# Patient Record
Sex: FEMALE | Race: BLACK OR AFRICAN AMERICAN | NOT HISPANIC OR LATINO | URBAN - METROPOLITAN AREA
[De-identification: names, ages, dates, MRNs, and addresses within clinical notes are randomized per-mention and may not be internally consistent; named-entity substitution may affect disease eponyms.]

---

## 2017-03-22 ENCOUNTER — OUTPATIENT (OUTPATIENT)
Dept: OUTPATIENT SERVICES | Facility: HOSPITAL | Age: 62
LOS: 1 days | Discharge: HOME | End: 2017-03-22

## 2017-06-27 DIAGNOSIS — B20 HUMAN IMMUNODEFICIENCY VIRUS [HIV] DISEASE: ICD-10-CM

## 2017-06-28 ENCOUNTER — OUTPATIENT (OUTPATIENT)
Dept: OUTPATIENT SERVICES | Facility: HOSPITAL | Age: 62
LOS: 1 days | Discharge: HOME | End: 2017-06-28

## 2017-06-28 DIAGNOSIS — R06.02 SHORTNESS OF BREATH: ICD-10-CM

## 2017-06-28 DIAGNOSIS — R11.2 NAUSEA WITH VOMITING, UNSPECIFIED: ICD-10-CM

## 2017-06-28 DIAGNOSIS — Z21 ASYMPTOMATIC HUMAN IMMUNODEFICIENCY VIRUS [HIV] INFECTION STATUS: ICD-10-CM

## 2017-06-28 DIAGNOSIS — B20 HUMAN IMMUNODEFICIENCY VIRUS [HIV] DISEASE: ICD-10-CM

## 2017-06-28 DIAGNOSIS — I10 ESSENTIAL (PRIMARY) HYPERTENSION: ICD-10-CM

## 2017-06-28 DIAGNOSIS — E11.9 TYPE 2 DIABETES MELLITUS WITHOUT COMPLICATIONS: ICD-10-CM

## 2017-06-28 DIAGNOSIS — R19.7 DIARRHEA, UNSPECIFIED: ICD-10-CM

## 2017-06-28 DIAGNOSIS — I67.1 CEREBRAL ANEURYSM, NONRUPTURED: ICD-10-CM

## 2017-06-28 DIAGNOSIS — F41.9 ANXIETY DISORDER, UNSPECIFIED: ICD-10-CM

## 2017-06-28 DIAGNOSIS — R63.4 ABNORMAL WEIGHT LOSS: ICD-10-CM

## 2017-09-27 ENCOUNTER — OUTPATIENT (OUTPATIENT)
Dept: OUTPATIENT SERVICES | Facility: HOSPITAL | Age: 62
LOS: 1 days | Discharge: HOME | End: 2017-09-27

## 2017-09-27 DIAGNOSIS — R63.4 ABNORMAL WEIGHT LOSS: ICD-10-CM

## 2017-09-27 DIAGNOSIS — R19.7 DIARRHEA, UNSPECIFIED: ICD-10-CM

## 2017-09-27 DIAGNOSIS — I67.1 CEREBRAL ANEURYSM, NONRUPTURED: ICD-10-CM

## 2017-09-27 DIAGNOSIS — E11.9 TYPE 2 DIABETES MELLITUS WITHOUT COMPLICATIONS: ICD-10-CM

## 2017-09-27 DIAGNOSIS — R11.2 NAUSEA WITH VOMITING, UNSPECIFIED: ICD-10-CM

## 2017-09-27 DIAGNOSIS — R06.02 SHORTNESS OF BREATH: ICD-10-CM

## 2017-09-27 DIAGNOSIS — Z21 ASYMPTOMATIC HUMAN IMMUNODEFICIENCY VIRUS [HIV] INFECTION STATUS: ICD-10-CM

## 2017-09-27 DIAGNOSIS — F41.9 ANXIETY DISORDER, UNSPECIFIED: ICD-10-CM

## 2017-09-27 DIAGNOSIS — I10 ESSENTIAL (PRIMARY) HYPERTENSION: ICD-10-CM

## 2017-09-27 DIAGNOSIS — B20 HUMAN IMMUNODEFICIENCY VIRUS [HIV] DISEASE: ICD-10-CM

## 2017-12-13 ENCOUNTER — OUTPATIENT (OUTPATIENT)
Dept: OUTPATIENT SERVICES | Facility: HOSPITAL | Age: 62
LOS: 1 days | Discharge: HOME | End: 2017-12-13

## 2017-12-13 DIAGNOSIS — Z21 ASYMPTOMATIC HUMAN IMMUNODEFICIENCY VIRUS [HIV] INFECTION STATUS: ICD-10-CM

## 2017-12-13 DIAGNOSIS — I67.1 CEREBRAL ANEURYSM, NONRUPTURED: ICD-10-CM

## 2017-12-13 DIAGNOSIS — R63.4 ABNORMAL WEIGHT LOSS: ICD-10-CM

## 2017-12-13 DIAGNOSIS — R19.7 DIARRHEA, UNSPECIFIED: ICD-10-CM

## 2017-12-13 DIAGNOSIS — E11.9 TYPE 2 DIABETES MELLITUS WITHOUT COMPLICATIONS: ICD-10-CM

## 2017-12-13 DIAGNOSIS — R06.02 SHORTNESS OF BREATH: ICD-10-CM

## 2017-12-13 DIAGNOSIS — B20 HUMAN IMMUNODEFICIENCY VIRUS [HIV] DISEASE: ICD-10-CM

## 2017-12-13 DIAGNOSIS — R11.2 NAUSEA WITH VOMITING, UNSPECIFIED: ICD-10-CM

## 2017-12-13 DIAGNOSIS — F41.9 ANXIETY DISORDER, UNSPECIFIED: ICD-10-CM

## 2017-12-13 DIAGNOSIS — I10 ESSENTIAL (PRIMARY) HYPERTENSION: ICD-10-CM

## 2018-03-14 ENCOUNTER — OUTPATIENT (OUTPATIENT)
Dept: OUTPATIENT SERVICES | Facility: HOSPITAL | Age: 63
LOS: 1 days | Discharge: HOME | End: 2018-03-14

## 2018-03-15 DIAGNOSIS — B20 HUMAN IMMUNODEFICIENCY VIRUS [HIV] DISEASE: ICD-10-CM

## 2018-06-09 ENCOUNTER — EMERGENCY (EMERGENCY)
Facility: HOSPITAL | Age: 63
LOS: 0 days | Discharge: HOME | End: 2018-06-09
Attending: EMERGENCY MEDICINE | Admitting: EMERGENCY MEDICINE

## 2018-06-09 VITALS
DIASTOLIC BLOOD PRESSURE: 82 MMHG | SYSTOLIC BLOOD PRESSURE: 138 MMHG | RESPIRATION RATE: 18 BRPM | TEMPERATURE: 99 F | OXYGEN SATURATION: 98 % | HEART RATE: 90 BPM

## 2018-06-09 VITALS
TEMPERATURE: 98 F | SYSTOLIC BLOOD PRESSURE: 152 MMHG | HEART RATE: 80 BPM | OXYGEN SATURATION: 96 % | DIASTOLIC BLOOD PRESSURE: 88 MMHG | RESPIRATION RATE: 18 BRPM

## 2018-06-09 DIAGNOSIS — N20.0 CALCULUS OF KIDNEY: ICD-10-CM

## 2018-06-09 DIAGNOSIS — R05 COUGH: ICD-10-CM

## 2018-06-09 DIAGNOSIS — J18.9 PNEUMONIA, UNSPECIFIED ORGANISM: ICD-10-CM

## 2018-06-09 DIAGNOSIS — F17.200 NICOTINE DEPENDENCE, UNSPECIFIED, UNCOMPLICATED: ICD-10-CM

## 2018-06-09 LAB
ALBUMIN SERPL ELPH-MCNC: 4.4 G/DL — SIGNIFICANT CHANGE UP (ref 3.5–5.2)
ALP SERPL-CCNC: 146 U/L — HIGH (ref 30–115)
ALT FLD-CCNC: 21 U/L — SIGNIFICANT CHANGE UP (ref 0–41)
ANION GAP SERPL CALC-SCNC: 15 MMOL/L — HIGH (ref 7–14)
APPEARANCE UR: CLEAR — SIGNIFICANT CHANGE UP
AST SERPL-CCNC: 45 U/L — HIGH (ref 0–41)
BASOPHILS # BLD AUTO: 0.03 K/UL — SIGNIFICANT CHANGE UP (ref 0–0.2)
BASOPHILS NFR BLD AUTO: 0.5 % — SIGNIFICANT CHANGE UP (ref 0–1)
BILIRUB SERPL-MCNC: 0.3 MG/DL — SIGNIFICANT CHANGE UP (ref 0.2–1.2)
BILIRUB UR-MCNC: NEGATIVE — SIGNIFICANT CHANGE UP
BUN SERPL-MCNC: 13 MG/DL — SIGNIFICANT CHANGE UP (ref 10–20)
CALCIUM SERPL-MCNC: 9.8 MG/DL — SIGNIFICANT CHANGE UP (ref 8.5–10.1)
CHLORIDE SERPL-SCNC: 102 MMOL/L — SIGNIFICANT CHANGE UP (ref 98–110)
CO2 SERPL-SCNC: 23 MMOL/L — SIGNIFICANT CHANGE UP (ref 17–32)
COLOR SPEC: YELLOW — SIGNIFICANT CHANGE UP
CREAT SERPL-MCNC: 1.5 MG/DL — SIGNIFICANT CHANGE UP (ref 0.7–1.5)
DIFF PNL FLD: NEGATIVE — SIGNIFICANT CHANGE UP
EOSINOPHIL # BLD AUTO: 0.08 K/UL — SIGNIFICANT CHANGE UP (ref 0–0.7)
EOSINOPHIL NFR BLD AUTO: 1.4 % — SIGNIFICANT CHANGE UP (ref 0–8)
EPI CELLS # UR: (no result) /HPF
GLUCOSE SERPL-MCNC: 145 MG/DL — HIGH (ref 70–99)
GLUCOSE UR QL: NEGATIVE MG/DL — SIGNIFICANT CHANGE UP
HCT VFR BLD CALC: 38.8 % — SIGNIFICANT CHANGE UP (ref 37–47)
HGB BLD-MCNC: 12.8 G/DL — SIGNIFICANT CHANGE UP (ref 12–16)
IMM GRANULOCYTES NFR BLD AUTO: 0.2 % — SIGNIFICANT CHANGE UP (ref 0.1–0.3)
KETONES UR-MCNC: NEGATIVE — SIGNIFICANT CHANGE UP
LACTATE SERPL-SCNC: 1.6 MMOL/L — SIGNIFICANT CHANGE UP (ref 0.5–2.2)
LEUKOCYTE ESTERASE UR-ACNC: NEGATIVE — SIGNIFICANT CHANGE UP
LIDOCAIN IGE QN: 47 U/L — SIGNIFICANT CHANGE UP (ref 7–60)
LYMPHOCYTES # BLD AUTO: 2.21 K/UL — SIGNIFICANT CHANGE UP (ref 1.2–3.4)
LYMPHOCYTES # BLD AUTO: 39.4 % — SIGNIFICANT CHANGE UP (ref 20.5–51.1)
MCHC RBC-ENTMCNC: 29.5 PG — SIGNIFICANT CHANGE UP (ref 27–31)
MCHC RBC-ENTMCNC: 33 G/DL — SIGNIFICANT CHANGE UP (ref 32–37)
MCV RBC AUTO: 89.4 FL — SIGNIFICANT CHANGE UP (ref 81–99)
MONOCYTES # BLD AUTO: 0.53 K/UL — SIGNIFICANT CHANGE UP (ref 0.1–0.6)
MONOCYTES NFR BLD AUTO: 9.4 % — HIGH (ref 1.7–9.3)
NEUTROPHILS # BLD AUTO: 2.75 K/UL — SIGNIFICANT CHANGE UP (ref 1.4–6.5)
NEUTROPHILS NFR BLD AUTO: 49.1 % — SIGNIFICANT CHANGE UP (ref 42.2–75.2)
NITRITE UR-MCNC: NEGATIVE — SIGNIFICANT CHANGE UP
NRBC # BLD: 0 /100 WBCS — SIGNIFICANT CHANGE UP (ref 0–0)
PH UR: 6 — SIGNIFICANT CHANGE UP (ref 5–8)
PLATELET # BLD AUTO: 293 K/UL — SIGNIFICANT CHANGE UP (ref 130–400)
POTASSIUM SERPL-MCNC: 4.7 MMOL/L — SIGNIFICANT CHANGE UP (ref 3.5–5)
POTASSIUM SERPL-SCNC: 4.7 MMOL/L — SIGNIFICANT CHANGE UP (ref 3.5–5)
PROT SERPL-MCNC: 7.7 G/DL — SIGNIFICANT CHANGE UP (ref 6–8)
PROT UR-MCNC: (no result) MG/DL
RBC # BLD: 4.34 M/UL — SIGNIFICANT CHANGE UP (ref 4.2–5.4)
RBC # FLD: 13.3 % — SIGNIFICANT CHANGE UP (ref 11.5–14.5)
SODIUM SERPL-SCNC: 140 MMOL/L — SIGNIFICANT CHANGE UP (ref 135–146)
SP GR SPEC: <=1.005 — SIGNIFICANT CHANGE UP (ref 1.01–1.03)
TROPONIN T SERPL-MCNC: <0.01 NG/ML — SIGNIFICANT CHANGE UP
UROBILINOGEN FLD QL: 0.2 MG/DL — SIGNIFICANT CHANGE UP (ref 0.2–0.2)
WBC # BLD: 5.61 K/UL — SIGNIFICANT CHANGE UP (ref 4.8–10.8)
WBC # FLD AUTO: 5.61 K/UL — SIGNIFICANT CHANGE UP (ref 4.8–10.8)
WBC UR QL: SIGNIFICANT CHANGE UP /HPF

## 2018-06-09 RX ORDER — SODIUM CHLORIDE 9 MG/ML
1000 INJECTION INTRAMUSCULAR; INTRAVENOUS; SUBCUTANEOUS ONCE
Qty: 0 | Refills: 0 | Status: COMPLETED | OUTPATIENT
Start: 2018-06-09 | End: 2018-06-09

## 2018-06-09 RX ORDER — MORPHINE SULFATE 50 MG/1
4 CAPSULE, EXTENDED RELEASE ORAL ONCE
Qty: 0 | Refills: 0 | Status: DISCONTINUED | OUTPATIENT
Start: 2018-06-09 | End: 2018-06-09

## 2018-06-09 RX ORDER — TAMSULOSIN HYDROCHLORIDE 0.4 MG/1
1 CAPSULE ORAL
Qty: 7 | Refills: 0
Start: 2018-06-09 | End: 2018-06-15

## 2018-06-09 RX ADMIN — MORPHINE SULFATE 4 MILLIGRAM(S): 50 CAPSULE, EXTENDED RELEASE ORAL at 16:21

## 2018-06-09 RX ADMIN — MORPHINE SULFATE 4 MILLIGRAM(S): 50 CAPSULE, EXTENDED RELEASE ORAL at 19:08

## 2018-06-09 RX ADMIN — SODIUM CHLORIDE 1000 MILLILITER(S): 9 INJECTION INTRAMUSCULAR; INTRAVENOUS; SUBCUTANEOUS at 15:58

## 2018-06-09 RX ADMIN — Medication 50 MILLIGRAM(S): at 15:58

## 2018-06-09 NOTE — ED PROVIDER NOTE - PROGRESS NOTE DETAILS
a/p:  left flank pain.    pt had CXR done at [facility] [place].  I was able to review the CD.  NO infiltratre, no pneumothorax.  CXR wqas officiall read as negative.    p:  ct, labs, ivf, pain control, reassess. a/p:  left flank pain.    pt had CXR done at [facility].  I was able to review the CD.   no pneumothorax.  CXR wqas officiall read as evidecne of bronchitis/ developing pneumonia on the right.  I reviewed the imaging.  will give abx. pt also with copd.  will give steroids  p:  ct, labs, ivf, pain control, reassess. pt with kidney stone.  no uti.  pt nontoxic, well appearing.  will dc with outpatient urology follow up.  pt wants to go home and is comfortable going home and wants to leave.  pt with cxr concerning for bronchitis/developing penumonia.  pt with cough.  pt with copd.  will give steroids and abx rx.  pt comfortable with plan.  pt to follow up pmd and urology. pt given copy of results.  pt states most recent viral load was undetectable. Rx resubmission for Percocet due to failure. verified with pharmacy

## 2018-06-09 NOTE — ED PROVIDER NOTE - MEDICAL DECISION MAKING DETAILS
Pt with 2 weeks of left flank pain.  no fevers, no chills.  ct showing kidney stone.  pt given flomax and rx for pain meds.  UA no uti.  pt wanted to go home.  pain was controlled.  pt dc with urology follow up.  Patient feeling better.  Pt dc with outpatient follow up.  Pt understands importance of outpatient follow up.  Pt given strict return precautions.   pt also with cough for over a week.  CXR done at [facility] concerning for possible developing PNA.  pt given rx for steroids and abx.  pt understands importance of follow up and taking meds.  pt wants to go home.  pt to follow up with her pmd and urology.  pt given strict return precautions.

## 2018-06-09 NOTE — ED PROVIDER NOTE - NS ED ROS FT
Constitutional:  no fevers, no chills, no malaise  ENMT: No neck pain or stiffness, no nasal congestion, no ear pain, no throat pain  Cardiac:  No chest pain  Respiratory:  + couygh  GI:  No nausea, vomiting, diarrhea or abdominal pain.  :  No dysuria, frequency or burning.  MS:  + left flank pain  Neuro:  No headache, no dizziness, no change in mental status  Skin:  No skin rash  Except as documented in the HPI,  all other systems are negative

## 2018-06-09 NOTE — ED PROVIDER NOTE - CARE PLAN
Principal Discharge DX:	Kidney stone  Secondary Diagnosis:	Pneumonia due to infectious organism, unspecified laterality, unspecified part of lung

## 2018-06-09 NOTE — ED PROVIDER NOTE - OBJECTIVE STATEMENT
61 yo f with pmh of HIV, most recently had undetectable viral load.  Pt is currently on HAART.  no abd pain, no nausea, no vomiting, no headache, no neck pain.  no cp, no sob.  pt with cough for 2 weeks.  no fevers, no chills. pt with 3 weeks of left flank pain.  no urinary complaints, no dysuria, no hematurisa.  pt was sent from [facility] for evaluation.  pt had cxr that was negativew at [facility] today.  no sob, no wheezingh.  pt with dry cough for 2 weeks.  no fevers, no chills. 63 yo f with pmh of HIV, most recently had undetectable viral load.  Pt is currently on HAART.  no abd pain, no nausea, no vomiting, no headache, no neck pain.  no cp, no sob.  pt with cough for 2 weeks.  no fevers, no chills. pt with 3 weeks of left flank pain.  no urinary complaints, no dysuria, no hematurisa.  pt was sent from [facility] for evaluation.  pt had cxr that was at [facility] today.  no sob, no wheezingh.  pt with dry cough for 2 weeks.  no fevers, no chills.

## 2018-06-13 ENCOUNTER — OUTPATIENT (OUTPATIENT)
Dept: OUTPATIENT SERVICES | Facility: HOSPITAL | Age: 63
LOS: 1 days | Discharge: HOME | End: 2018-06-13

## 2018-06-13 DIAGNOSIS — B20 HUMAN IMMUNODEFICIENCY VIRUS [HIV] DISEASE: ICD-10-CM

## 2018-06-20 ENCOUNTER — EMERGENCY (EMERGENCY)
Facility: HOSPITAL | Age: 63
LOS: 0 days | Discharge: HOME | End: 2018-06-20
Attending: EMERGENCY MEDICINE | Admitting: EMERGENCY MEDICINE

## 2018-06-20 VITALS
RESPIRATION RATE: 18 BRPM | HEART RATE: 94 BPM | TEMPERATURE: 97 F | DIASTOLIC BLOOD PRESSURE: 75 MMHG | SYSTOLIC BLOOD PRESSURE: 141 MMHG | OXYGEN SATURATION: 98 %

## 2018-06-20 VITALS — WEIGHT: 149.91 LBS | HEIGHT: 65 IN

## 2018-06-20 DIAGNOSIS — R05 COUGH: ICD-10-CM

## 2018-06-20 DIAGNOSIS — E78.00 PURE HYPERCHOLESTEROLEMIA, UNSPECIFIED: ICD-10-CM

## 2018-06-20 DIAGNOSIS — R10.9 UNSPECIFIED ABDOMINAL PAIN: ICD-10-CM

## 2018-06-20 DIAGNOSIS — Z87.442 PERSONAL HISTORY OF URINARY CALCULI: ICD-10-CM

## 2018-06-20 DIAGNOSIS — E11.9 TYPE 2 DIABETES MELLITUS WITHOUT COMPLICATIONS: ICD-10-CM

## 2018-06-20 DIAGNOSIS — I10 ESSENTIAL (PRIMARY) HYPERTENSION: ICD-10-CM

## 2018-06-20 DIAGNOSIS — Z79.2 LONG TERM (CURRENT) USE OF ANTIBIOTICS: ICD-10-CM

## 2018-06-20 DIAGNOSIS — F17.200 NICOTINE DEPENDENCE, UNSPECIFIED, UNCOMPLICATED: ICD-10-CM

## 2018-06-20 DIAGNOSIS — Z79.891 LONG TERM (CURRENT) USE OF OPIATE ANALGESIC: ICD-10-CM

## 2018-06-20 DIAGNOSIS — Z79.899 OTHER LONG TERM (CURRENT) DRUG THERAPY: ICD-10-CM

## 2018-06-20 NOTE — ED PROVIDER NOTE - PHYSICAL EXAMINATION
GENERAL:  well appearing, non-toxic female in no acute distress  SKIN: skin warm, pink and dry. MMM. no rash.  ENT:  Airway intact. Patent oropharynx without erythema or exudate. Uvula midline. TMs clear b/l.   NECK: Neck supple. No  meningismus, or JVD. Trachea midline  PULM: CTAB. Normal respiratory effort. No respiratory distress. No wheezes, stridor, rales or rhonchi. No retractions  CV: RRR, no M/R/G.   ABD: Soft, non-tender, non-distended. No rebound or guarding. No CVA tenderness.  MSK: FROM of all extremities.  No MSK tenderness. No edema, erythema, cyanosis. Distal pulses intact.  NEURO: A+Ox3, no sensory/motor deficits

## 2018-06-20 NOTE — ED PROVIDER NOTE - MEDICAL DECISION MAKING DETAILS
recent dx of pna on right and proximal 3x3 stone on left who presents with some minor discomfort with coughing still. she states she just wanted to be checked out as her symptoms have almost completely resolved, minimal cough, flank pain improve after percocet, plan is to repeat cxr, we I dsicussed expected management, she will need to fu with urology.

## 2018-06-20 NOTE — ED PROVIDER NOTE - OBJECTIVE STATEMENT
Graft Type: Full Thickness Skin Graft Skin Substitute Units (Will Override Primary Defect Units If Greater Than 0): 0 Primary Defect Length (In Cm): 1.4 Pre-Op Size Of Lesion Removed (Optional): 0.7 Graft Donor Site Will Heal By Secondary Intention: No Repair Type: Graft Type Of Previous Surgery (Optional- Ie Mohs Surgery): Mohs surgery X Size Of Lesion In Cm (Optional): 0.6 Graft Donor Site: left preauricular Include The Following Details In The Note (If No Details Will Only Be Reflected In The Surgical Fax): Yes Detail Level: Detailed Anesthesia Volume In Cc: 5 Date Of Previous Surgery (Optional): 12/14/17 Suture Removal: 5 days Hatchet Flap Text: The defect edges were debeveled with a #15 scalpel blade.  Given the location of the defect, shape of the defect and the proximity to free margins a hatchet flap was deemed most appropriate.  Using a sterile surgical marker, an appropriate hatchet flap was drawn incorporating the defect and placing the expected incisions within the relaxed skin tension lines where possible.    The area thus outlined was incised deep to adipose tissue with a #15 scalpel blade.  The skin margins were undermined to an appropriate distance in all directions utilizing iris scissors. Post-Care Instructions: I reviewed with the patient in detail post-care instructions. Patient is not to engage in any heavy lifting, exercise, or swimming for the next 8 days. Should the patient develop any fevers, chills, bleeding, severe pain patient will contact the office immediately. Advancement Flap (Single) Text: The defect edges were debeveled with a #15 scalpel blade.  Given the location of the defect and the proximity to free margins a single advancement flap was deemed most appropriate.  Using a sterile surgical marker, an appropriate advancement flap was drawn incorporating the defect and placing the expected incisions within the relaxed skin tension lines where possible.    The area thus outlined was incised deep to adipose tissue with a #15 scalpel blade.  The skin margins were undermined to an appropriate distance in all directions utilizing iris scissors. 61 yo female with h/o HIV (undetectable viral load), HTN, HLD, DM presents to the ED c/o mild cough and improving left flank pain. Patient was recently evaluated in ED on 6/9 - had CT which showed left proximal 3x2 stone and had outpatient xray whish showed right sided pneumonia. Patient d/armando on levaquin, prednisone and percocet. States her symptoms have significantly improved but wanted to be checked. Denies fever, chills, chest pain, sob, abdominal pain, N/V/D, urinary sxs. Complex Repair Preamble Text (Leave Blank If You Do Not Want): The defect edges were debeveled with a #15 scalpel blade. Given the location of the defect a complex repair was deemed most appropriate.  Using a sterile surgical marker, burrow triangles were drawn incorporating the defect and placing the expected incisions within the relaxed skin tension lines where possible.    The area thus outlined was incised to the appropriate tissue plane with a scalpel blade. Extensive wide undermining was performed in all directions to allow proper closure of the defect.  Hemostasis was obtained by cautery. Cheiloplasty (Less Than 50%) Text: A decision was made to reconstruct the defect with a  cheiloplasty.  The defect was undermined extensively.  Additional obicularis oris muscle was excised with a 15 blade scalpel.  The defect was converted into a full thickness wedge, of less than 50% of the vertical height of the lip, to facilite a better cosmetic result.  Small vessels were then tied off with 5-0 monocyrl. The obicularis oris, superficial fascia, adipose and dermis were then reapproximated.  After the deeper layers were approximated the epidermis was reapproximated with particular care given to realign the vermillion border. Skin Substitute Text: The defect edges were debeveled with a #15 scalpel blade.  Given the location of the defect, shape of the defect and the proximity to free margins a skin substitute graft was deemed most appropriate.  The graft material was trimmed to fit the size of the defect. The graft was then placed in the primary defect and oriented appropriately. H Plasty Text: Given the location of the defect, shape of the defect and the proximity to free margins a H-plasty was deemed most appropriate for repair.  Using a sterile surgical marker, the appropriate advancement arms of the H-plasty were drawn incorporating the defect and placing the expected incisions within the relaxed skin tension lines where possible. The area thus outlined was incised deep to adipose tissue with a #15 scalpel blade. The skin margins were undermined to an appropriate distance in all directions utilizing iris scissors.  The opposing advancement arms were then advanced into place in opposite direction and anchored with interrupted buried subcutaneous sutures. Closure 3 Information: This tab is for additional flaps and grafts above and beyond our usual structured repairs.  Please note if you enter information here it will not currently bill and you will need to add the billing information manually. Ftsg Text: The defect edges were debeveled with a #15 scalpel blade.  Given the location of the defect, shape of the defect and the proximity to free margins a full thickness skin graft was deemed most appropriate.  Using a sterile surgical marker, the primary defect shape was transferred to the donor site. The area thus outlined was incised deep to adipose tissue with a #15 scalpel blade.  The harvested graft was then trimmed of adipose tissue until only dermis and epidermis was left.  The skin margins of the secondary defect were undermined to an appropriate distance in all directions utilizing iris scissors.  The secondary defect was closed with interrupted buried subcutaneous sutures.  The skin edges were then re-apposed with running  sutures.  The skin graft was then placed in the primary defect and oriented appropriately. Spiral Flap Text: The defect edges were debeveled with a #15 scalpel blade.  Given the location of the defect, shape of the defect and the proximity to free margins a spiral flap was deemed most appropriate.  Using a sterile surgical marker, an appropriate rotation flap was drawn incorporating the defect and placing the expected incisions within the relaxed skin tension lines where possible. The area thus outlined was incised deep to adipose tissue with a #15 scalpel blade.  The skin margins were undermined to an appropriate distance in all directions utilizing iris scissors. Helical Rim Advancement Flap Text: The defect edges were debeveled with a #15 blade scalpel.  Given the location of the defect and the proximity to free margins (helical rim) a double helical rim advancement flap was deemed most appropriate.  Using a sterile surgical marker, the appropriate advancement flaps were drawn incorporating the defect and placing the expected incisions between the helical rim and antihelix where possible.  The area thus outlined was incised through and through with a #15 scalpel blade.  With a skin hook and iris scissors, the flaps were gently and sharply undermined and freed up. Paramedian Forehead Flap Text: A decision was made to reconstruct the defect utilizing an interpolation axial flap and a staged reconstruction.  A telfa template was made of the defect.  This telfa template was then used to outline the paramedian forehead pedicle flap.  The donor area for the pedicle flap was then injected with anesthesia.  The flap was excised through the skin and subcutaneous tissue down to the layer of the underlying musculature.  The pedicle flap was carefully excised within this deep plane to maintain its blood supply.  The edges of the donor site were undermined.   The donor site was closed in a primary fashion.  The pedicle was then rotated into position and sutured.  Once the tube was sutured into place, adequate blood supply was confirmed with blanching and refill.  The pedicle was then wrapped with xeroform gauze and dressed appropriately with a telfa and gauze bandage to ensure continued blood supply and protect the attached pedicle. V-Y Plasty Text: The defect edges were debeveled with a #15 scalpel blade.  Given the location of the defect, shape of the defect and the proximity to free margins an V-Y advancement flap was deemed most appropriate.  Using a sterile surgical marker, an appropriate advancement flap was drawn incorporating the defect and placing the expected incisions within the relaxed skin tension lines where possible.    The area thus outlined was incised deep to adipose tissue with a #15 scalpel blade.  The skin margins were undermined to an appropriate distance in all directions utilizing iris scissors. Closure 4 Information: This tab is for additional flaps and grafts above and beyond our usual structured repairs.  Please note if you enter information here it will not currently bill and you will need to add the billing information manually. Mastoid Interpolation Flap Text: A decision was made to reconstruct the defect utilizing an interpolation axial flap and a staged reconstruction.  A telfa template was made of the defect.  This telfa template was then used to outline the mastoid interpolation flap.  The donor area for the pedicle flap was then injected with anesthesia.  The flap was excised through the skin and subcutaneous tissue down to the layer of the underlying musculature.  The pedicle flap was carefully excised within this deep plane to maintain its blood supply.  The edges of the donor site were undermined.   The donor site was closed in a primary fashion.  The pedicle was then rotated into position and sutured.  Once the tube was sutured into place, adequate blood supply was confirmed with blanching and refill.  The pedicle was then wrapped with xeroform gauze and dressed appropriately with a telfa and gauze bandage to ensure continued blood supply and protect the attached pedicle. O-Z Plasty Text: The defect edges were debeveled with a #15 scalpel blade.  Given the location of the defect, shape of the defect and the proximity to free margins an O-Z plasty (double transposition flap) was deemed most appropriate.  Using a sterile surgical marker, the appropriate transposition flaps were drawn incorporating the defect and placing the expected incisions within the relaxed skin tension lines where possible.    The area thus outlined was incised deep to adipose tissue with a #15 scalpel blade.  The skin margins were undermined to an appropriate distance in all directions utilizing iris scissors.  Hemostasis was achieved with electrocautery.  The flaps were then transposed into place, one clockwise and the other counterclockwise, and anchored with interrupted buried subcutaneous sutures. Intermediate Repair Preamble Text (Leave Blank If You Do Not Want): Undermining was performed with blunt dissection. Secondary Intention Text (Leave Blank If You Do Not Want): The defect will heal with secondary intention. Island Pedicle Flap With Canthal Suspension Text: The defect edges were debeveled with a #15 scalpel blade.  Given the location of the defect, shape of the defect and the proximity to free margins an island pedicle advancement flap was deemed most appropriate.  Using a sterile surgical marker, an appropriate advancement flap was drawn incorporating the defect, outlining the appropriate donor tissue and placing the expected incisions within the relaxed skin tension lines where possible. The area thus outlined was incised deep to adipose tissue with a #15 scalpel blade.  The skin margins were undermined to an appropriate distance in all directions around the primary defect and laterally outward around the island pedicle utilizing iris scissors.  There was minimal undermining beneath the pedicle flap. A suspension suture was placed in the canthal tendon to prevent tension and prevent ectropion. Posterior Auricular Interpolation Flap Division And Inset Text: Division and inset of the posterior auricular interpolation flap was performed to achieve optimal aesthetic result, restore normal anatomic appearance and avoid distortion of normal anatomy, expedite and facilitate wound healing, achieve optimal functional result and because linear closure either not possible or would produce suboptimal result. The patient was prepped and draped in the usual manner. The pedicle was infiltrated with local anesthesia. The pedicle was sectioned with a #15 blade. The pedicle was de-bulked and trimmed to match the shape of the defect. Hemostasis was achieved. The flap donor site and free margin of the flap were secured with deep buried sutures and the wound edges were re-approximated. No Repair - Repaired With Adjacent Surgical Defect Text (Leave Blank If You Do Not Want): After obtaining clear surgical margins the defect was repaired concurrently with another surgical defect which was in close approximation. Alar Island Pedicle Flap Text: The defect edges were debeveled with a #15 scalpel blade.  Given the location of the defect, shape of the defect and the proximity to the alar rim an island pedicle advancement flap was deemed most appropriate.  Using a sterile surgical marker, an appropriate advancement flap was drawn incorporating the defect, outlining the appropriate donor tissue and placing the expected incisions within the nasal ala running parallel to the alar rim. The area thus outlined was incised with a #15 scalpel blade.  The skin margins were undermined minimally to an appropriate distance in all directions around the primary defect and laterally outward around the island pedicle utilizing iris scissors.  There was minimal undermining beneath the pedicle flap. Tissue Cultured Epidermal Autograft Text: The defect edges were debeveled with a #15 scalpel blade.  Given the location of the defect, shape of the defect and the proximity to free margins a tissue cultured epidermal autograft was deemed most appropriate.  The graft was then trimmed to fit the size of the defect.  The graft was then placed in the primary defect and oriented appropriately. Advancement-Rotation Flap Text: The defect edges were debeveled with a #15 scalpel blade.  Given the location of the defect, shape of the defect and the proximity to free margins an advancement-rotation flap was deemed most appropriate.  Using a sterile surgical marker, an appropriate flap was drawn incorporating the defect and placing the expected incisions within the relaxed skin tension lines where possible. The area thus outlined was incised deep to adipose tissue with a #15 scalpel blade.  The skin margins were undermined to an appropriate distance in all directions utilizing iris scissors. Epidermal Autograft Text: The defect edges were debeveled with a #15 scalpel blade.  Given the location of the defect, shape of the defect and the proximity to free margins an epidermal autograft was deemed most appropriate.  Using a sterile surgical marker, the primary defect shape was transferred to the donor site. The epidermal graft was then harvested.  The skin graft was then placed in the primary defect and oriented appropriately. Bilateral Helical Rim Advancement Flap Text: The defect edges were debeveled with a #15 blade scalpel.  Given the location of the defect and the proximity to free margins (helical rim) a bilateral helical rim advancement flap was deemed most appropriate.  Using a sterile surgical marker, the appropriate advancement flaps were drawn incorporating the defect and placing the expected incisions between the helical rim and antihelix where possible.  The area thus outlined was incised through and through with a #15 scalpel blade.  With a skin hook and iris scissors, the flaps were gently and sharply undermined and freed up. Tarsorrhaphy Text: A tarsorrhaphy was performed using Frost sutures. Mastoid Interpolation Flap Division And Inset Text: Division and inset of the mastoid interpolation flap was performed to achieve optimal aesthetic result, restore normal anatomic appearance and avoid distortion of normal anatomy, expedite and facilitate wound healing, achieve optimal functional result and because linear closure either not possible or would produce suboptimal result. The patient was prepped and draped in the usual manner. The pedicle was infiltrated with local anesthesia. The pedicle was sectioned with a #15 blade. The pedicle was de-bulked and trimmed to match the shape of the defect. Hemostasis was achieved. The flap donor site and free margin of the flap were secured with deep buried sutures and the wound edges were re-approximated. Bi-Rhombic Flap Text: The defect edges were debeveled with a #15 scalpel blade.  Given the location of the defect and the proximity to free margins a bi-rhombic flap was deemed most appropriate.  Using a sterile surgical marker, an appropriate rhombic flap was drawn incorporating the defect. The area thus outlined was incised deep to adipose tissue with a #15 scalpel blade.  The skin margins were undermined to an appropriate distance in all directions utilizing iris scissors. Posterior Auricular Interpolation Flap Text: A decision was made to reconstruct the defect utilizing an interpolation axial flap and a staged reconstruction.  A telfa template was made of the defect.  This telfa template was then used to outline the posterior auricular interpolation flap.  The donor area for the pedicle flap was then injected with anesthesia.  The flap was excised through the skin and subcutaneous tissue down to the layer of the underlying musculature.  The pedicle flap was carefully excised within this deep plane to maintain its blood supply.  The edges of the donor site were undermined.   The donor site was closed in a primary fashion.  The pedicle was then rotated into position and sutured.  Once the tube was sutured into place, adequate blood supply was confirmed with blanching and refill.  The pedicle was then wrapped with xeroform gauze and dressed appropriately with a telfa and gauze bandage to ensure continued blood supply and protect the attached pedicle. Closure 2 Information: This tab is for additional flaps and grafts, including complex repair and grafts and complex repair and flaps. You can also specify a different location for the additional defect, if the location is the same you do not need to select a new one. We will insert the automated text for the repair you select below just as we do for solitary flaps and grafts. Please note that at this time if you select a location with a different insurance zone you will need to override the ICD10 and CPT if appropriate. Island Pedicle Flap-Requiring Vessel Identification Text: The defect edges were debeveled with a #15 scalpel blade.  Given the location of the defect, shape of the defect and the proximity to free margins an island pedicle advancement flap was deemed most appropriate.  Using a sterile surgical marker, an appropriate advancement flap was drawn, based on the axial vessel mentioned above, incorporating the defect, outlining the appropriate donor tissue and placing the expected incisions within the relaxed skin tension lines where possible.    The area thus outlined was incised deep to adipose tissue with a #15 scalpel blade.  The skin margins were undermined to an appropriate distance in all directions around the primary defect and laterally outward around the island pedicle utilizing iris scissors.  There was minimal undermining beneath the pedicle flap. Ear Wedge Repair Text: A wedge excision was completed by carrying down an excision through the full thickness of the ear and cartilage with an inward facing Burow's triangle. The wound was then closed in a layered fashion. Dressing: pressure dressing with telfa Graft Donor Site Epidermal Sutures (Optional): 6-0 Ethilon Referred To Mid-Level For Closure Text (Leave Blank If You Do Not Want): After obtaining clear surgical margins the patient was sent to a mid-level provider for surgical repair.  The patient understands they will receive post-surgical care and follow-up from the mid-level provider. Repair Performed By Another Provider Text (Leave Blank If You Do Not Want): After obtaining clear surgical margins the defect was repaired by another provider. O-T Advancement Flap Text: The defect edges were debeveled with a #15 scalpel blade.  Given the location of the defect, shape of the defect and the proximity to free margins an O-T advancement flap was deemed most appropriate.  Using a sterile surgical marker, an appropriate advancement flap was drawn incorporating the defect and placing the expected incisions within the relaxed skin tension lines where possible.    The area thus outlined was incised deep to adipose tissue with a #15 scalpel blade.  The skin margins were undermined to an appropriate distance in all directions utilizing iris scissors. Xenograft Text: The defect edges were debeveled with a #15 scalpel blade.  Given the location of the defect, shape of the defect and the proximity to free margins a xenograft was deemed most appropriate.  The graft was then trimmed to fit the size of the defect.  The graft was then placed in the primary defect and oriented appropriately. W Plasty Text: The lesion was extirpated to the level of the fat with a #15 scalpel blade.  Given the location of the defect, shape of the defect and the proximity to free margins a W-plasty was deemed most appropriate for repair.  Using a sterile surgical marker, the appropriate transposition arms of the W-plasty were drawn incorporating the defect and placing the expected incisions within the relaxed skin tension lines where possible.    The area thus outlined was incised deep to adipose tissue with a #15 scalpel blade.  The skin margins were undermined to an appropriate distance in all directions utilizing iris scissors.  The opposing transposition arms were then transposed into place in opposite direction and anchored with interrupted buried subcutaneous sutures. Complex Repair And Flap Additional Text (Will Appearing After The Standard Complex Repair Text): The complex repair was not sufficient to completely close the primary defect. The remaining additional defect was repaired with the flap mentioned below. Rotation Flap Text: The defect edges were debeveled with a #15 scalpel blade.  Given the location of the defect, shape of the defect and the proximity to free margins a rotation flap was deemed most appropriate.  Using a sterile surgical marker, an appropriate rotation flap was drawn incorporating the defect and placing the expected incisions within the relaxed skin tension lines where possible.    The area thus outlined was incised deep to adipose tissue with a #15 scalpel blade.  The skin margins were undermined to an appropriate distance in all directions utilizing iris scissors. Dermal Autograft Text: The defect edges were debeveled with a #15 scalpel blade.  Given the location of the defect, shape of the defect and the proximity to free margins a dermal autograft was deemed most appropriate.  Using a sterile surgical marker, the primary defect shape was transferred to the donor site. The area thus outlined was incised deep to adipose tissue with a #15 scalpel blade.  The harvested graft was then trimmed of adipose and epidermal tissue until only dermis was left.  The skin graft was then placed in the primary defect and oriented appropriately. Crescentic Advancement Flap Text: The defect edges were debeveled with a #15 scalpel blade.  Given the location of the defect and the proximity to free margins a crescentic advancement flap was deemed most appropriate.  Using a sterile surgical marker, the appropriate advancement flap was drawn incorporating the defect and placing the expected incisions within the relaxed skin tension lines where possible.    The area thus outlined was incised deep to adipose tissue with a #15 scalpel blade.  The skin margins were undermined to an appropriate distance in all directions utilizing iris scissors. Cheiloplasty (Complex) Text: A decision was made to reconstruct the defect with a  cheiloplasty.  The defect was undermined extensively.  Additional obicularis oris muscle was excised with a 15 blade scalpel.  The defect was converted into a full thickness wedge to facilite a better cosmetic result.  Small vessels were then tied off with 5-0 monocyrl. The obicularis oris, superficial fascia, adipose and dermis were then reapproximated.  After the deeper layers were approximated the epidermis was reapproximated with particular care given to realign the vermillion border. Full Thickness Lip Wedge Repair (Flap) Text: Given the location of the defect and the proximity to free margins a full thickness wedge repair was deemed most appropriate.  Using a sterile surgical marker, the appropriate repair was drawn incorporating the defect and placing the expected incisions perpendicular to the vermilion border.  The vermilion border was also meticulously outlined to ensure appropriate reapproximation during the repair.  The area thus outlined was incised through and through with a #15 scalpel blade.  The muscularis and dermis were reaproximated with deep sutures following hemostasis. Care was taken to realign the vermilion border before proceeding with the superficial closure.  Once the vermilion was realigned the superfical and mucosal closure was finished. Referred To Oculoplastics For Closure Text (Leave Blank If You Do Not Want): After obtaining clear surgical margins the patient was sent to oculoplastics for surgical repair.  The patient understands they will receive post-surgical care and follow-up from the referring physician's office. Referred To Otolaryngology For Closure Text (Leave Blank If You Do Not Want): After obtaining clear surgical margins the patient was sent to otolaryngology for surgical repair.  The patient understands they will receive post-surgical care and follow-up from the referring physician's office. Manual Repair Warning Statement: We plan on removing the manually selected variable below in favor of our much easier automatic structured text blocks found in the previous tab. We decided to do this to help make the flow better and give you the full power of structured data. Manual selection is never going to be ideal in our platform and I would encourage you to avoid using manual selection from this point on, especially since I will be sunsetting this feature. It is important that you do one of two things with the customized text below. First, you can save all of the text in a word file so you can have it for future reference. Second, transfer the text to the appropriate area in the Library tab. Lastly, if there is a flap or graft type which we do not have you need to let us know right away so I can add it in before the variable is hidden. No need to panic, we plan to give you roughly 6 months to make the change. Island Pedicle Flap Text: The defect edges were debeveled with a #15 scalpel blade.  Given the location of the defect, shape of the defect and the proximity to free margins an island pedicle advancement flap was deemed most appropriate.  Using a sterile surgical marker, an appropriate advancement flap was drawn incorporating the defect, outlining the appropriate donor tissue and placing the expected incisions within the relaxed skin tension lines where possible.    The area thus outlined was incised deep to adipose tissue with a #15 scalpel blade.  The skin margins were undermined to an appropriate distance in all directions around the primary defect and laterally outward around the island pedicle utilizing iris scissors.  There was minimal undermining beneath the pedicle flap. Partial Purse String (Intermediate) Text: Given the location of the defect and the characteristics of the surrounding skin an intermediate purse string closure was deemed most appropriate.  Undermining was performed circumfirentially around the surgical defect.  A purse string suture was then placed and tightened. Wound tension only allowed a partial closure of the circular defect. Hemostasis: Electrocautery Melolabial Interpolation Flap Division And Inset Text: Division and inset of the melolabial interpolation flap was performed to achieve optimal aesthetic result, restore normal anatomic appearance and avoid distortion of normal anatomy, expedite and facilitate wound healing, achieve optimal functional result and because linear closure either not possible or would produce suboptimal result. The patient was prepped and draped in the usual manner. The pedicle was infiltrated with local anesthesia. The pedicle was sectioned with a #15 blade. The pedicle was de-bulked and trimmed to match the shape of the defect. Hemostasis was achieved. The flap donor site and free margin of the flap were secured with deep buried sutures and the wound edges were re-approximated. A-T Advancement Flap Text: The defect edges were debeveled with a #15 scalpel blade.  Given the location of the defect, shape of the defect and the proximity to free margins an A-T advancement flap was deemed most appropriate.  Using a sterile surgical marker, an appropriate advancement flap was drawn incorporating the defect and placing the expected incisions within the relaxed skin tension lines where possible.    The area thus outlined was incised deep to adipose tissue with a #15 scalpel blade.  The skin margins were undermined to an appropriate distance in all directions utilizing iris scissors. Complex Repair And Graft Additional Text (Will Appearing After The Standard Complex Repair Text): The complex repair was not sufficient to completely close the primary defect. The remaining additional defect was repaired with the graft mentioned below. Cheek Interpolation Flap Division And Inset Text: Division and inset of the cheek interpolation flap was performed to achieve optimal aesthetic result, restore normal anatomic appearance and avoid distortion of normal anatomy, expedite and facilitate wound healing, achieve optimal functional result and because linear closure either not possible or would produce suboptimal result. The patient was prepped and draped in the usual manner. The pedicle was infiltrated with local anesthesia. The pedicle was sectioned with a #15 blade. The pedicle was de-bulked and trimmed to match the shape of the defect. Hemostasis was achieved. The flap donor site and free margin of the flap were secured with deep buried sutures and the wound edges were re-approximated. Interpolation Flap Text: A decision was made to reconstruct the defect utilizing an interpolation axial flap and a staged reconstruction.  A telfa template was made of the defect.  This telfa template was then used to outline the interpolation flap.  The donor area for the pedicle flap was then injected with anesthesia.  The flap was excised through the skin and subcutaneous tissue down to the layer of the underlying musculature.  The interpolation flap was carefully excised within this deep plane to maintain its blood supply.  The edges of the donor site were undermined.   The donor site was closed in a primary fashion.  The pedicle was then rotated into position and sutured.  Once the tube was sutured into place, adequate blood supply was confirmed with blanching and refill.  The pedicle was then wrapped with xeroform gauze and dressed appropriately with a telfa and gauze bandage to ensure continued blood supply and protect the attached pedicle. Bilobed Transposition Flap Text: The defect edges were debeveled with a #15 scalpel blade.  Given the location of the defect and the proximity to free margins a bilobed transposition flap was deemed most appropriate.  Using a sterile surgical marker, an appropriate bilobe flap drawn around the defect.    The area thus outlined was incised deep to adipose tissue with a #15 scalpel blade.  The skin margins were undermined to an appropriate distance in all directions utilizing iris scissors. Transposition Flap Text: The defect edges were debeveled with a #15 scalpel blade.  Given the location of the defect and the proximity to free margins a transposition flap was deemed most appropriate.  Using a sterile surgical marker, an appropriate transposition flap was drawn incorporating the defect.    The area thus outlined was incised deep to adipose tissue with a #15 scalpel blade.  The skin margins were undermined to an appropriate distance in all directions utilizing iris scissors. Cheek Interpolation Flap Text: A decision was made to reconstruct the defect utilizing an interpolation axial flap and a staged reconstruction.  A telfa template was made of the defect.  This telfa template was then used to outline the Cheek Interpolation flap.  The donor area for the pedicle flap was then injected with anesthesia.  The flap was excised through the skin and subcutaneous tissue down to the layer of the underlying musculature.  The interpolation flap was carefully excised within this deep plane to maintain its blood supply.  The edges of the donor site were undermined.   The donor site was closed in a primary fashion.  The pedicle was then rotated into position and sutured.  Once the tube was sutured into place, adequate blood supply was confirmed with blanching and refill.  The pedicle was then wrapped with xeroform gauze and dressed appropriately with a telfa and gauze bandage to ensure continued blood supply and protect the attached pedicle. Partial Purse String (Simple) Text: Given the location of the defect and the characteristics of the surrounding skin a simple purse string closure was deemed most appropriate.  Undermining was performed circumfirentially around the surgical defect.  A purse string suture was then placed and tightened. Wound tension only allowed a partial closure of the circular defect. Purse String (Intermediate) Text: Given the location of the defect and the characteristics of the surrounding skin a pursestring intermediate closure was deemed most appropriate.  Undermining was performed circumfirentially around the surgical defect.  A purstring suture was then placed and tightened. Muscle Hinge Flap Text: The defect edges were debeveled with a #15 scalpel blade.  Given the size, depth and location of the defect and the proximity to free margins a muscle hinge flap was deemed most appropriate.  Using a sterile surgical marker, an appropriate hinge flap was drawn incorporating the defect. The area thus outlined was incised with a #15 scalpel blade.  The skin margins were undermined to an appropriate distance in all directions utilizing iris scissors. Cheek-To-Nose Interpolation Flap Text: A decision was made to reconstruct the defect utilizing an interpolation axial flap and a staged reconstruction.  A telfa template was made of the defect.  This telfa template was then used to outline the Cheek-To-Nose Interpolation flap.  The donor area for the pedicle flap was then injected with anesthesia.  The flap was excised through the skin and subcutaneous tissue down to the layer of the underlying musculature.  The interpolation flap was carefully excised within this deep plane to maintain its blood supply.  The edges of the donor site were undermined.   The donor site was closed in a primary fashion.  The pedicle was then rotated into position and sutured.  Once the tube was sutured into place, adequate blood supply was confirmed with blanching and refill.  The pedicle was then wrapped with xeroform gauze and dressed appropriately with a telfa and gauze bandage to ensure continued blood supply and protect the attached pedicle. Graft Basting Suture (Optional): 5-0 Plain Gut Referred To Asc For Closure Text (Leave Blank If You Do Not Want): After obtaining clear surgical margins the patient was sent to an ASC for surgical repair.  The patient understands they will receive post-surgical care and follow-up from the ASC physician. Referred To Plastics For Closure Text (Leave Blank If You Do Not Want): After obtaining clear surgical margins the patient was sent to plastics for surgical repair.  The patient understands they will receive post-surgical care and follow-up from the referring physician's office. Trilobed Flap Text: The defect edges were debeveled with a #15 scalpel blade.  Given the location of the defect and the proximity to free margins a trilobed flap was deemed most appropriate.  Using a sterile surgical marker, an appropriate trilobed flap drawn around the defect.    The area thus outlined was incised deep to adipose tissue with a #15 scalpel blade.  The skin margins were undermined to an appropriate distance in all directions utilizing iris scissors. Consent: The rationale for Repairs was explained to the patient and consent was obtained. The risks, benefits and alternatives to therapy were discussed in detail. Specifically, the risks of infection, scarring, bleeding, prolonged wound healing, incomplete removal, allergy to anesthesia, nerve injury and recurrence were addressed. Prior to the procedure, the treatment site was clearly identified and confirmed by the patient. All components of Universal Protocol/PAUSE Rule completed. Melolabial Transposition Flap Text: The defect edges were debeveled with a #15 scalpel blade.  Given the location of the defect and the proximity to free margins a melolabial flap was deemed most appropriate.  Using a sterile surgical marker, an appropriate melolabial transposition flap was drawn incorporating the defect.    The area thus outlined was incised deep to adipose tissue with a #15 scalpel blade.  The skin margins were undermined to an appropriate distance in all directions utilizing iris scissors. Burow's Advancement Flap Text: The defect edges were debeveled with a #15 scalpel blade.  Given the location of the defect and the proximity to free margins a Burow's advancement flap was deemed most appropriate.  Using a sterile surgical marker, the appropriate advancement flap was drawn incorporating the defect and placing the expected incisions within the relaxed skin tension lines where possible.    The area thus outlined was incised deep to adipose tissue with a #15 scalpel blade.  The skin margins were undermined to an appropriate distance in all directions utilizing iris scissors. Mucosal Advancement Flap Text: Given the location of the defect, shape of the defect and the proximity to free margins a mucosal advancement flap was deemed most appropriate. Incisions were made with a 15 blade scalpel in the appropriate fashion along the cutaneous vermilion border and the mucosal lip. The remaining actinically damaged mucosal tissue was excised.  The mucosal advancement flap was then elevated to the gingival sulcus with care taken to preserve the neurovascular structures and advanced into the primary defect. Care was taken to ensure that precise realignment of the vermilion border was achieved. S Plasty Text: Given the location and shape of the defect, and the orientation of relaxed skin tension lines, an S-plasty was deemed most appropriate for repair.  Using a sterile surgical marker, the appropriate outline of the S-plasty was drawn, incorporating the defect and placing the expected incisions within the relaxed skin tension lines where possible.  The area thus outlined was incised deep to adipose tissue with a #15 scalpel blade.  The skin margins were undermined to an appropriate distance in all directions utilizing iris scissors. The skin flaps were advanced over the defect.  The opposing margins were then approximated with interrupted buried subcutaneous sutures. Estimated Blood Loss (Cc): minimal Epidermal Closure: running O-L Flap Text: The defect edges were debeveled with a #15 scalpel blade.  Given the location of the defect, shape of the defect and the proximity to free margins an O-L flap was deemed most appropriate.  Using a sterile surgical marker, an appropriate advancement flap was drawn incorporating the defect and placing the expected incisions within the relaxed skin tension lines where possible.    The area thus outlined was incised deep to adipose tissue with a #15 scalpel blade.  The skin margins were undermined to an appropriate distance in all directions utilizing iris scissors. O-T Plasty Text: The defect edges were debeveled with a #15 scalpel blade.  Given the location of the defect, shape of the defect and the proximity to free margins an O-T plasty was deemed most appropriate.  Using a sterile surgical marker, an appropriate O-T plasty was drawn incorporating the defect and placing the expected incisions within the relaxed skin tension lines where possible.    The area thus outlined was incised deep to adipose tissue with a #15 scalpel blade.  The skin margins were undermined to an appropriate distance in all directions utilizing iris scissors. Bilobed Flap Text: The defect edges were debeveled with a #15 scalpel blade.  Given the location of the defect and the proximity to free margins a bilobe flap was deemed most appropriate.  Using a sterile surgical marker, an appropriate bilobe flap drawn around the defect.    The area thus outlined was incised deep to adipose tissue with a #15 scalpel blade.  The skin margins were undermined to an appropriate distance in all directions utilizing iris scissors. Wound Care: Vaseline Rhombic Flap Text: The defect edges were debeveled with a #15 scalpel blade.  Given the location of the defect and the proximity to free margins a rhombic flap was deemed most appropriate.  Using a sterile surgical marker, an appropriate rhombic flap was drawn incorporating the defect.    The area thus outlined was incised deep to adipose tissue with a #15 scalpel blade.  The skin margins were undermined to an appropriate distance in all directions utilizing iris scissors. Composite Graft Text: The defect edges were debeveled with a #15 scalpel blade.  Given the location of the defect, shape of the defect, the proximity to free margins and the fact the defect was full thickness a composite graft was deemed most appropriate.  The defect was outline and then transferred to the donor site.  A full thickness graft was then excised from the donor site. The graft was then placed in the primary defect, oriented appropriately and then sutured into place.  The secondary defect was then repaired using a primary closure. Purse String (Simple) Text: Given the location of the defect and the characteristics of the surrounding skin a pursestring closure was deemed most appropriate.  Undermining was performed circumfirentially around the surgical defect.  A purstring suture was then placed and tightened. Localized Dermabrasion Text: The patient was draped in routine manner.  Localized dermabrasion using 3 x 17 mm wire brush was performed in routine manner to papillary dermis. This spot dermabrasion is being performed to complete skin cancer reconstruction. It also will eliminate the other sun damaged precancerous cells that are known to be part of the regional effect of a lifetime's worth of sun exposure. This localized dermabrasion is therapeutic and should not be considered cosmetic in any regard. Advancement Flap (Double) Text: The defect edges were debeveled with a #15 scalpel blade.  Given the location of the defect and the proximity to free margins a double advancement flap was deemed most appropriate.  Using a sterile surgical marker, the appropriate advancement flaps were drawn incorporating the defect and placing the expected incisions within the relaxed skin tension lines where possible.    The area thus outlined was incised deep to adipose tissue with a #15 scalpel blade.  The skin margins were undermined to an appropriate distance in all directions utilizing iris scissors. Split-Thickness Skin Graft Text: The defect edges were debeveled with a #15 scalpel blade.  Given the location of the defect, shape of the defect and the proximity to free margins a split thickness skin graft was deemed most appropriate.  Using a sterile surgical marker, the primary defect shape was transferred to the donor site. The split thickness graft was then harvested.  The skin graft was then placed in the primary defect and oriented appropriately. Star Wedge Flap Text: The defect edges were debeveled with a #15 scalpel blade.  Given the location of the defect, shape of the defect and the proximity to free margins a star wedge flap was deemed most appropriate.  Using a sterile surgical marker, an appropriate rotation flap was drawn incorporating the defect and placing the expected incisions within the relaxed skin tension lines where possible. The area thus outlined was incised deep to adipose tissue with a #15 scalpel blade.  The skin margins were undermined to an appropriate distance in all directions utilizing iris scissors. Lazy S Complex Repair Preamble Text (Leave Blank If You Do Not Want): Extensive wide undermining was performed. Cheek To Nose Interpolation Flap Division And Inset Text: Division and inset of the cheek to nose interpolation flap was performed to achieve optimal aesthetic result, restore normal anatomic appearance and avoid distortion of normal anatomy, expedite and facilitate wound healing, achieve optimal functional result and because linear closure either not possible or would produce suboptimal result. The patient was prepped and draped in the usual manner. The pedicle was infiltrated with local anesthesia. The pedicle was sectioned with a #15 blade. The pedicle was de-bulked and trimmed to match the shape of the defect. Hemostasis was achieved. The flap donor site and free margin of the flap were secured with deep buried sutures and the wound edges were re-approximated. Keystone Flap Text: The defect edges were debeveled with a #15 scalpel blade.  Given the location of the defect, shape of the defect a keystone flap was deemed most appropriate.  Using a sterile surgical marker, an appropriate keystone flap was drawn incorporating the defect, outlining the appropriate donor tissue and placing the expected incisions within the relaxed skin tension lines where possible. The area thus outlined was incised deep to adipose tissue with a #15 scalpel blade.  The skin margins were undermined to an appropriate distance in all directions around the primary defect and laterally outward around the flap utilizing iris scissors. Dorsal Nasal Flap Text: The defect edges were debeveled with a #15 scalpel blade.  Given the location of the defect and the proximity to free margins a dorsal nasal flap was deemed most appropriate.  Using a sterile surgical marker, an appropriate dorsal nasal flap was drawn around the defect.    The area thus outlined was incised deep to adipose tissue with a #15 scalpel blade.  The skin margins were undermined to an appropriate distance in all directions utilizing iris scissors. Ear Star Wedge Flap Text: The defect edges were debeveled with a #15 blade scalpel.  Given the location of the defect and the proximity to free margins (helical rim) an ear star wedge flap was deemed most appropriate.  Using a sterile surgical marker, the appropriate flap was drawn incorporating the defect and placing the expected incisions between the helical rim and antihelix where possible.  The area thus outlined was incised through and through with a #15 scalpel blade. Graft Donor Site Dermal Sutures (Optional): 4-0 Vicryl Z Plasty Text: The lesion was extirpated to the level of the fat with a #15 scalpel blade.  Given the location of the defect, shape of the defect and the proximity to free margins a Z-plasty was deemed most appropriate for repair.  Using a sterile surgical marker, the appropriate transposition arms of the Z-plasty were drawn incorporating the defect and placing the expected incisions within the relaxed skin tension lines where possible.    The area thus outlined was incised deep to adipose tissue with a #15 scalpel blade.  The skin margins were undermined to an appropriate distance in all directions utilizing iris scissors.  The opposing transposition arms were then transposed into place in opposite direction and anchored with interrupted buried subcutaneous sutures. V-Y Flap Text: The defect edges were debeveled with a #15 scalpel blade.  Given the location of the defect, shape of the defect and the proximity to free margins a V-Y flap was deemed most appropriate.  Using a sterile surgical marker, an appropriate advancement flap was drawn incorporating the defect and placing the expected incisions within the relaxed skin tension lines where possible.    The area thus outlined was incised deep to adipose tissue with a #15 scalpel blade.  The skin margins were undermined to an appropriate distance in all directions utilizing iris scissors. Cartilage Graft Text: The defect edges were debeveled with a #15 scalpel blade.  Given the location of the defect, shape of the defect, the fact the defect involved a full thickness cartilage defect a cartilage graft was deemed most appropriate.  An appropriate donor site was identified, cleansed, and anesthetized. The cartilage graft was then harvested and transferred to the recipient site, oriented appropriately and then sutured into place.  The secondary defect was then repaired using a primary closure. Melolabial Interpolation Flap Text: A decision was made to reconstruct the defect utilizing an interpolation axial flap and a staged reconstruction.  A telfa template was made of the defect.  This telfa template was then used to outline the melolabial interpolation flap.  The donor area for the pedicle flap was then injected with anesthesia.  The flap was excised through the skin and subcutaneous tissue down to the layer of the underlying musculature.  The pedicle flap was carefully excised within this deep plane to maintain its blood supply.  The edges of the donor site were undermined.   The donor site was closed in a primary fashion.  The pedicle was then rotated into position and sutured.  Once the tube was sutured into place, adequate blood supply was confirmed with blanching and refill.  The pedicle was then wrapped with xeroform gauze and dressed appropriately with a telfa and gauze bandage to ensure continued blood supply and protect the attached pedicle. Anesthesia Type: 1% Xylocaine with epinephrine Modified Advancement Flap Text: The defect edges were debeveled with a #15 scalpel blade.  Given the location of the defect, shape of the defect and the proximity to free margins a modified advancement flap was deemed most appropriate.  Using a sterile surgical marker, an appropriate advancement flap was drawn incorporating the defect and placing the expected incisions within the relaxed skin tension lines where possible.    The area thus outlined was incised deep to adipose tissue with a #15 scalpel blade.  The skin margins were undermined to an appropriate distance in all directions utilizing iris scissors. Epidermal Closure Graft Donor Site (Optional): running locked Double Island Pedicle Flap Text: The defect edges were debeveled with a #15 scalpel blade.  Given the location of the defect, shape of the defect and the proximity to free margins a double island pedicle advancement flap was deemed most appropriate.  Using a sterile surgical marker, an appropriate advancement flap was drawn incorporating the defect, outlining the appropriate donor tissue and placing the expected incisions within the relaxed skin tension lines where possible.    The area thus outlined was incised deep to adipose tissue with a #15 scalpel blade.  The skin margins were undermined to an appropriate distance in all directions around the primary defect and laterally outward around the island pedicle utilizing iris scissors.  There was minimal undermining beneath the pedicle flap. Paramedian Forehead Flap Division And Inset Text: Division and inset of the paramedian forehead flap was performed to achieve optimal aesthetic result, restore normal anatomic appearance and avoid distortion of normal anatomy, expedite and facilitate wound healing, achieve optimal functional result and because linear closure either not possible or would produce suboptimal result. The patient was prepped and draped in the usual manner. The pedicle was infiltrated with local anesthesia. The pedicle was sectioned with a #15 blade. The pedicle was de-bulked and trimmed to match the shape of the defect. Hemostasis was achieved. The flap donor site and free margin of the flap were secured with deep buried sutures and the wound edges were re-approximated.

## 2018-06-20 NOTE — ED PROVIDER NOTE - NS ED ROS FT
Constitutional: no fever, chills or generalized weakness  ENT: no URI sxs, no throat pain, no change in voice, no excessive drooling, no ear pain/discharge, no hearing changes.   Cardiovascular: no chest pain, no sob, no syncope , no palpitations,  Respiratory: + cough. no shortness of breath, no h/o asthma or COPD.  Gastrointestinal: no nausea, vomiting or diarrhea. no abdominal pain. no melena or BRBPR. no prior abdominal surgeries.   : + h/o kidney stone, improved left flank pain, no urinary sx.   Musculoskeletal: no msk pain or injury. no neck pain, no back pain, no joint pain.    Integumentary: no rash or skin changes. no edema  Neurological: no headache, no dizziness, no visual changes, no UE/LE weakness or paresthesias. no change in mental status. no neck pain or stiffness.

## 2018-07-11 ENCOUNTER — OUTPATIENT (OUTPATIENT)
Dept: OUTPATIENT SERVICES | Facility: HOSPITAL | Age: 63
LOS: 1 days | Discharge: HOME | End: 2018-07-11

## 2018-07-11 DIAGNOSIS — B20 HUMAN IMMUNODEFICIENCY VIRUS [HIV] DISEASE: ICD-10-CM

## 2019-01-09 ENCOUNTER — EMERGENCY (EMERGENCY)
Facility: HOSPITAL | Age: 64
LOS: 0 days | Discharge: HOME | End: 2019-01-09

## 2019-01-16 DIAGNOSIS — B20 HUMAN IMMUNODEFICIENCY VIRUS [HIV] DISEASE: ICD-10-CM

## 2019-01-16 PROBLEM — E11.9 TYPE 2 DIABETES MELLITUS WITHOUT COMPLICATIONS: Chronic | Status: ACTIVE | Noted: 2018-06-09

## 2019-01-16 PROBLEM — I10 ESSENTIAL (PRIMARY) HYPERTENSION: Chronic | Status: ACTIVE | Noted: 2018-06-09

## 2019-01-16 PROBLEM — E78.00 PURE HYPERCHOLESTEROLEMIA, UNSPECIFIED: Chronic | Status: ACTIVE | Noted: 2018-06-09

## 2019-05-01 ENCOUNTER — OUTPATIENT (OUTPATIENT)
Dept: OUTPATIENT SERVICES | Facility: HOSPITAL | Age: 64
LOS: 1 days | Discharge: HOME | End: 2019-05-01

## 2019-05-01 DIAGNOSIS — B20 HUMAN IMMUNODEFICIENCY VIRUS [HIV] DISEASE: ICD-10-CM

## 2019-09-04 ENCOUNTER — OUTPATIENT (OUTPATIENT)
Dept: OUTPATIENT SERVICES | Facility: HOSPITAL | Age: 64
LOS: 1 days | Discharge: HOME | End: 2019-09-04

## 2019-09-05 DIAGNOSIS — B20 HUMAN IMMUNODEFICIENCY VIRUS [HIV] DISEASE: ICD-10-CM

## 2019-09-26 NOTE — ED ADULT TRIAGE NOTE - MODE OF ARRIVAL
Bedside and Verbal shift change report given to TRISTAN Pro (oncoming nurse) by Montrell Gamble (offgoing nurse). Report included the following information SBAR, Kardex and Recent Results. Private Vehicle

## 2019-12-30 PROBLEM — Z00.00 ENCOUNTER FOR PREVENTIVE HEALTH EXAMINATION: Status: ACTIVE | Noted: 2019-12-30

## 2020-02-05 ENCOUNTER — INPATIENT (INPATIENT)
Facility: HOSPITAL | Age: 65
LOS: 1 days | Discharge: PSYCHIATRIC OR SUBSTANCE USE FACILITY | End: 2020-02-07
Attending: SPECIALIST | Admitting: SPECIALIST
Payer: MEDICAID

## 2020-02-05 VITALS
RESPIRATION RATE: 20 BRPM | TEMPERATURE: 99 F | HEART RATE: 97 BPM | SYSTOLIC BLOOD PRESSURE: 170 MMHG | WEIGHT: 149.91 LBS | OXYGEN SATURATION: 100 % | DIASTOLIC BLOOD PRESSURE: 80 MMHG

## 2020-02-05 DIAGNOSIS — F05 DELIRIUM DUE TO KNOWN PHYSIOLOGICAL CONDITION: ICD-10-CM

## 2020-02-05 LAB
ALBUMIN SERPL ELPH-MCNC: 3.7 G/DL — SIGNIFICANT CHANGE UP (ref 3.5–5.2)
ALP SERPL-CCNC: 79 U/L — SIGNIFICANT CHANGE UP (ref 30–115)
ALT FLD-CCNC: 28 U/L — SIGNIFICANT CHANGE UP (ref 0–41)
ANION GAP SERPL CALC-SCNC: 13 MMOL/L — SIGNIFICANT CHANGE UP (ref 7–14)
APAP SERPL-MCNC: <5 UG/ML — LOW (ref 10–30)
APPEARANCE UR: ABNORMAL
AST SERPL-CCNC: 49 U/L — HIGH (ref 0–41)
BACTERIA # UR AUTO: ABNORMAL
BASOPHILS # BLD AUTO: 0.02 K/UL — SIGNIFICANT CHANGE UP (ref 0–0.2)
BASOPHILS NFR BLD AUTO: 0.3 % — SIGNIFICANT CHANGE UP (ref 0–1)
BILIRUB SERPL-MCNC: 0.3 MG/DL — SIGNIFICANT CHANGE UP (ref 0.2–1.2)
BILIRUB UR-MCNC: NEGATIVE — SIGNIFICANT CHANGE UP
BUN SERPL-MCNC: 15 MG/DL — SIGNIFICANT CHANGE UP (ref 10–20)
CALCIUM SERPL-MCNC: 9.7 MG/DL — SIGNIFICANT CHANGE UP (ref 8.5–10.1)
CHLORIDE SERPL-SCNC: 108 MMOL/L — SIGNIFICANT CHANGE UP (ref 98–110)
CO2 SERPL-SCNC: 20 MMOL/L — SIGNIFICANT CHANGE UP (ref 17–32)
COLOR SPEC: YELLOW — SIGNIFICANT CHANGE UP
CREAT SERPL-MCNC: 1.4 MG/DL — SIGNIFICANT CHANGE UP (ref 0.7–1.5)
DIFF PNL FLD: NEGATIVE — SIGNIFICANT CHANGE UP
EOSINOPHIL # BLD AUTO: 0.07 K/UL — SIGNIFICANT CHANGE UP (ref 0–0.7)
EOSINOPHIL NFR BLD AUTO: 1.1 % — SIGNIFICANT CHANGE UP (ref 0–8)
EPI CELLS # UR: 1 /HPF — SIGNIFICANT CHANGE UP (ref 0–5)
ETHANOL SERPL-MCNC: <10 MG/DL — SIGNIFICANT CHANGE UP
GLUCOSE BLDC GLUCOMTR-MCNC: 81 MG/DL — SIGNIFICANT CHANGE UP (ref 70–99)
GLUCOSE SERPL-MCNC: 102 MG/DL — HIGH (ref 70–99)
GLUCOSE UR QL: NEGATIVE — SIGNIFICANT CHANGE UP
HCT VFR BLD CALC: 36.4 % — LOW (ref 37–47)
HGB BLD-MCNC: 12.4 G/DL — SIGNIFICANT CHANGE UP (ref 12–16)
HYALINE CASTS # UR AUTO: 0 /LPF — SIGNIFICANT CHANGE UP (ref 0–7)
IMM GRANULOCYTES NFR BLD AUTO: 0.5 % — HIGH (ref 0.1–0.3)
KETONES UR-MCNC: SIGNIFICANT CHANGE UP
LACTATE SERPL-SCNC: 1.5 MMOL/L — SIGNIFICANT CHANGE UP (ref 0.7–2)
LEUKOCYTE ESTERASE UR-ACNC: NEGATIVE — SIGNIFICANT CHANGE UP
LIDOCAIN IGE QN: 40 U/L — SIGNIFICANT CHANGE UP (ref 7–60)
LYMPHOCYTES # BLD AUTO: 1.57 K/UL — SIGNIFICANT CHANGE UP (ref 1.2–3.4)
LYMPHOCYTES # BLD AUTO: 25.6 % — SIGNIFICANT CHANGE UP (ref 20.5–51.1)
MAGNESIUM SERPL-MCNC: 1.8 MG/DL — SIGNIFICANT CHANGE UP (ref 1.8–2.4)
MCHC RBC-ENTMCNC: 30.5 PG — SIGNIFICANT CHANGE UP (ref 27–31)
MCHC RBC-ENTMCNC: 34.1 G/DL — SIGNIFICANT CHANGE UP (ref 32–37)
MCV RBC AUTO: 89.7 FL — SIGNIFICANT CHANGE UP (ref 81–99)
MONOCYTES # BLD AUTO: 0.65 K/UL — HIGH (ref 0.1–0.6)
MONOCYTES NFR BLD AUTO: 10.6 % — HIGH (ref 1.7–9.3)
NEUTROPHILS # BLD AUTO: 3.79 K/UL — SIGNIFICANT CHANGE UP (ref 1.4–6.5)
NEUTROPHILS NFR BLD AUTO: 61.9 % — SIGNIFICANT CHANGE UP (ref 42.2–75.2)
NITRITE UR-MCNC: POSITIVE
NRBC # BLD: 0 /100 WBCS — SIGNIFICANT CHANGE UP (ref 0–0)
PH UR: 6.5 — SIGNIFICANT CHANGE UP (ref 5–8)
PLATELET # BLD AUTO: 206 K/UL — SIGNIFICANT CHANGE UP (ref 130–400)
POTASSIUM SERPL-MCNC: 4.7 MMOL/L — SIGNIFICANT CHANGE UP (ref 3.5–5)
POTASSIUM SERPL-SCNC: 4.7 MMOL/L — SIGNIFICANT CHANGE UP (ref 3.5–5)
PROT SERPL-MCNC: 7.7 G/DL — SIGNIFICANT CHANGE UP (ref 6–8)
PROT UR-MCNC: ABNORMAL
RBC # BLD: 4.06 M/UL — LOW (ref 4.2–5.4)
RBC # FLD: 14.1 % — SIGNIFICANT CHANGE UP (ref 11.5–14.5)
RBC CASTS # UR COMP ASSIST: 2 /HPF — SIGNIFICANT CHANGE UP (ref 0–4)
SALICYLATES SERPL-MCNC: <0.3 MG/DL — LOW (ref 4–30)
SODIUM SERPL-SCNC: 141 MMOL/L — SIGNIFICANT CHANGE UP (ref 135–146)
SP GR SPEC: 1.02 — SIGNIFICANT CHANGE UP (ref 1.01–1.02)
TROPONIN T SERPL-MCNC: <0.01 NG/ML — SIGNIFICANT CHANGE UP
UROBILINOGEN FLD QL: SIGNIFICANT CHANGE UP
WBC # BLD: 6.13 K/UL — SIGNIFICANT CHANGE UP (ref 4.8–10.8)
WBC # FLD AUTO: 6.13 K/UL — SIGNIFICANT CHANGE UP (ref 4.8–10.8)
WBC UR QL: 2 /HPF — SIGNIFICANT CHANGE UP (ref 0–5)

## 2020-02-05 PROCEDURE — 99285 EMERGENCY DEPT VISIT HI MDM: CPT

## 2020-02-05 PROCEDURE — 71045 X-RAY EXAM CHEST 1 VIEW: CPT | Mod: 26

## 2020-02-05 PROCEDURE — 70450 CT HEAD/BRAIN W/O DYE: CPT | Mod: 26

## 2020-02-05 PROCEDURE — 93010 ELECTROCARDIOGRAM REPORT: CPT

## 2020-02-05 NOTE — H&P ADULT - NSHPLABSRESULTS_GEN_ALL_CORE
12.4   6.13  )-----------( 206      ( 2020 08:10 )             36.4       02-05    141  |  108  |  15  ----------------------------<  102<H>  4.7   |  20  |  1.4    Ca    9.7      2020 08:10  Mg     1.8     -    TPro  7.7  /  Alb  3.7  /  TBili  0.3  /  DBili  x   /  AST  49<H>  /  ALT  28  /  AlkPhos  79  02-05              Urinalysis Basic - ( 2020 09:45 )    Color: Yellow / Appearance: Slightly Turbid / S.017 / pH: x  Gluc: x / Ketone: Trace  / Bili: Negative / Urobili: <2 mg/dL   Blood: x / Protein: 30 mg/dL / Nitrite: Positive   Leuk Esterase: Negative / RBC: 2 /HPF / WBC 2 /HPF   Sq Epi: x / Non Sq Epi: 1 /HPF / Bacteria: Many            Lactate Trend   @ 08:10 Lactate:1.5       CARDIAC MARKERS ( 2020 08:10 )  x     / <0.01 ng/mL / x     / x     / x            CAPILLARY BLOOD GLUCOSE

## 2020-02-05 NOTE — BEHAVIORAL HEALTH ASSESSMENT NOTE - NSBHCHARTREVIEWINVESTIGATE_PSY_A_CORE FT
< from: 12 Lead ECG (02.05.20 @ 11:17) >    Ventricular Rate 92 BPM    Atrial Rate 92 BPM    P-R Interval 114 ms    QRS Duration 70 ms    Q-T Interval 368 ms    QTC Calculation(Bezet) 455 ms    P Axis 68 degrees    R Axis 26 degrees    T Axis 31 degrees    Diagnosis Line Normal sinus rhythm  Possible Left atrial enlargement  RSR' or QR pattern in V1 suggests right ventricular conduction delay  Abnormal ECG    Confirmed by Parviz Wesley ([id number]) on 2/5/2020 2:14:52 PM    < end of copied text >

## 2020-02-05 NOTE — BEHAVIORAL HEALTH ASSESSMENT NOTE - NSBHCHARTREVIEWLAB_PSY_A_CORE FT
12.4   6.13  )-----------( 206      ( 05 Feb 2020 08:10 )             36.4   02-05    141  |  108  |  15  ----------------------------<  102<H>  4.7   |  20  |  1.4    Ca    9.7      05 Feb 2020 08:10  Mg     1.8     02-05    TPro  7.7  /  Alb  3.7  /  TBili  0.3  /  DBili  x   /  AST  49<H>  /  ALT  28  /  AlkPhos  79  02-05    Urine Microscopic-Add On (NC) (02.05.20 @ 09:45)    Bacteria: Many    Epithelial Cells: 1 /HPF    Red Blood Cell - Urine: 2 /HPF    White Blood Cell - Urine: 2 /HPF    Hyaline Casts: 0 /LPF

## 2020-02-05 NOTE — H&P ADULT - ASSESSMENT
This is a 64 year old F pt with PMHx of HTN, DM, HIV ( off medications for few months) and psychiatric history of bipolar disorder present to the ED for Altered mental status     #Altered mental status   -CT head was negative for any acute inta cranial pathology   -patient has been off psychiatric medications for the last few months   -Will get psychiatry consult before restarting any medication  -Will get in touch with [facility] to obtain the medical records      #HTN  -Neither the family or the patient know what medications the patient is taking   -Will obtain the medical records    #HIV  -Patient has been off medications for few months   -T cell count and viral load were ordered, follow up the results     #  -Off oral medications  -Monitor the blood sugar, start insulin if above 180    DVT ppx: Heparin Subq  GI prophylaxis: protonix  Dispo: pending Psych evaluation   Full code This is a 64 year old F pt with PMHx of HTN, DM, HIV ( off medications for few months) and psychiatric history of bipolar disorder present to the ED for Altered mental status     #Altered mental status   -CT head was negative for any acute inta cranial pathology   -patient has been off psychiatric medications for the last few months   -Will get psychiatry consult before restarting any medication  -Will get in touch with [facility] to obtain the medical records      #HTN  -Neither the family or the patient know what medications the patient is taking   -Will obtain the medical records    #HIV  -Patient has been off medications for few months   -T cell count and viral load were ordered, follow up the results     #DM  -Off oral medications  -Monitor the blood sugar, start insulin if above 180    DVT ppx: Heparin Subq  Diet: Carb consistent   GI prophylaxis: protonix  Dispo: pending Psych evaluation   Full code

## 2020-02-05 NOTE — ED PROVIDER NOTE - PHYSICAL EXAMINATION
CONST: NAD  EYES: Sclera and conjunctiva clear.   ENT: No nasal discharge. Oropharynx normal appearing, no erythema or exudates. No abscess or swelling. Uvula midline.   NECK: Non-tender, no meningeal signs. normal ROM. supple   CARD: S1 S2; No jvd  RESP: Equal BS B/L, No wheezes, rhonchi or rales. No distress  GI: Soft, non-tender, non-distended. normal BS  MS: Normal ROM in all extremities. pulses 2 +  SKIN: Warm, dry, no acute rashes. Good turgor  NEURO: A&Ox2, No focal deficits. Strength 5/5 with no sensory deficits.

## 2020-02-05 NOTE — BEHAVIORAL HEALTH ASSESSMENT NOTE - DIFFERENTIAL
Given patient's presentation, she appears to be experiencing acute delirium 2/2 UTI vs. Hypertensive urgency vs. HIV (off all medications). Patients with psychiatric illnesses are more at risk for developing encephalopathic states, and this patient in particular has several acute medical issues that could be contributing to her current presentation.

## 2020-02-05 NOTE — BEHAVIORAL HEALTH ASSESSMENT NOTE - PAST PSYCHOTROPIC MEDICATION
Patient recalls being on Haldol, Navane, and Lithium in the past, but cannot provide any more information.

## 2020-02-05 NOTE — ED PROVIDER NOTE - MDM PATIENT STATEMENT FOR ADDL TREATMENT
Patient with one or more new problems requiring additional work-up/treatment.
May return to school as tolerated. No contact sports or gym

## 2020-02-05 NOTE — BEHAVIORAL HEALTH ASSESSMENT NOTE - NSBHCONSULTSUBSTANCEALCOHOL_PSY_A_CORE FT
It is unclear how much and how recently patient was drinking alcohol, but should be cautious for risk of withdrawal at this time. Recommend for CIWA>8, giving patient Ativan 2mg PRN.

## 2020-02-05 NOTE — ED PROVIDER NOTE - OBJECTIVE STATEMENT
64y F pmh hiv sent by family form ams. As per family pt has stopped taking her medications and has started taking elicit drugs. She has been more confused over past week and unable to care for herself. As per daughter.

## 2020-02-05 NOTE — BEHAVIORAL HEALTH ASSESSMENT NOTE - OTHER
cooperative initially but unable engage meaningfully shortly after. makes bizarre movements with her mouth repetitively. unable to assess. waxing and waning in nature n/a

## 2020-02-05 NOTE — ED ADULT NURSE REASSESSMENT NOTE - NS ED NURSE REASSESS COMMENT FT1
patient awake/alert/disoriented to time/situation. v/s stable. no acute distress. iv patent. patient denies any complaints. safety maintained.

## 2020-02-05 NOTE — BEHAVIORAL HEALTH ASSESSMENT NOTE - OTHER PAST PSYCHIATRIC HISTORY (INCLUDE DETAILS REGARDING ONSET, COURSE OF ILLNESS, INPATIENT/OUTPATIENT TREATMENT)
As per collateral, patient has long history of psychiatric illness with multiple IPP admissions, most recently a few months ago at [facility]. Patient and collateral are not able to provide a name of a psychiatrist, and upon calling patient's pharmacy, [facility], her last prescription she picked up was in April, 2019 - Haldol 10mg QHS, Cogentin 0.5mg BID, prescribed by [facility] at [facility].

## 2020-02-05 NOTE — ED PROVIDER NOTE - ATTENDING CONTRIBUTION TO CARE
I personally evaluated the patient. I reviewed the Resident’s or Physician Assistant’s note (as assigned above), and agree with the findings and plan except as documented in my note.     64 female here for evaluation of inability to care for herself brought to ED by EMS with limited HPI due to existing cognitive deficits. Per family the patient cannot provide ADLs and is unable to take maintenance Rx. Known HIV+ unknown CD4 or VL    ROS unable to obtain    PE: cachectic female in no distress. HEENT: speech limited, no focal deficits, appears deconditioned. PSYCH: mood normal, limited insight to illness. CV: pulses intact. CHEST: CTA bilateral. ABD: soft, non rigid. EXT: thin.     Impression: HIV+, weakness    Plan: IV labs imaging supportive care and reevaluation

## 2020-02-05 NOTE — H&P ADULT - HISTORY OF PRESENT ILLNESS
This is a 64 year old F patient with past medical history of HTN, DM and HIV ( was on HAART ) who was brought to the ED by EMS for altered mental status. The patient is a poor historian and unable to participate in the history, upon talking to the family they stated that the patient is not at her baseline and she can no longer take care of herself. She does not take her medication any more.    They denied she having any recent illness, cough, diarrhea, or any complaints during urination This is a 64 year old F patient with past medical history of HTN, DM and HIV ( was on HAART ) and Bipolar/schizophrenic?  who was brought to the ED by EMS for altered mental status. The patient is a poor historian and unable to participate in the history, upon talking to the family they stated that the patient is not at her baseline and she can no longer take care of herself. She has been off her psychiatric and HIV medications for months as per the daughter at bedside.     They denied she having any recent illness, cough, diarrhea, or any complaints during urination or any other symptoms

## 2020-02-05 NOTE — ED ADULT NURSE NOTE - NSIMPLEMENTINTERV_GEN_ALL_ED
Implemented All Fall with Harm Risk Interventions:  [place] to call system. Call bell, personal items and telephone within reach. Instruct patient to call for assistance. Room bathroom lighting operational. Non-slip footwear when patient is off stretcher. Physically safe environment: no spills, clutter or unnecessary equipment. Stretcher in lowest position, wheels locked, appropriate side rails in place. Provide visual cue, wrist band, yellow gown, etc. Monitor gait and stability. Monitor for mental status changes and reorient to person, place, and time. Review medications for side effects contributing to fall risk. Reinforce activity limits and safety measures with patient and family. Provide visual clues: red socks.

## 2020-02-05 NOTE — ED PROVIDER NOTE - NS ED ROS FT
I am unable to obtain a comprehensive history, review of systems, past medical history, and/or physical exam due to constraints imposed by the patient's clinical condition and/or mental status.

## 2020-02-05 NOTE — ED PROVIDER NOTE - FAMILY DETAILS FREE TEXT FOR MDM ADDL HISTORY OBTAINED FROM QUESTION
Family contacted by phone to discuss HPI, findings, plan and other instructions. Questions answered.

## 2020-02-05 NOTE — H&P ADULT - NSHPPHYSICALEXAM_GEN_ALL_CORE
GENERAL: NAD, Lying in bed  HEENT: AT, NC, PERRLA  NECK: Supple, no JVD, no lymphadenopathy  Heart: S1/S2 appreciated no murmurs   Abdomen: soft, nondistended  Neuro: Alert and oriented*3, pressured speech

## 2020-02-05 NOTE — BEHAVIORAL HEALTH ASSESSMENT NOTE - DESCRIPTION (FIRST USE, LAST USE, QUANTITY, FREQUENCY, DURATION)
1ppd since age 15. as per collateral, patient began drinking 2 years ago in context of new relationship. Patient endorses last drink was earlier today, unable to provide further details. same as above for alcohol. history of crack cocaine use, denies recent use

## 2020-02-05 NOTE — BEHAVIORAL HEALTH ASSESSMENT NOTE - NSBHCONSULTMEDS_PSY_A_CORE FT
Medical workup and med management should be optimized prior to restarting patient on psychiatric medications at this time.

## 2020-02-05 NOTE — ED ADULT TRIAGE NOTE - CHIEF COMPLAINT QUOTE
BIBA for AMS   as per EMS son called 911 for change in mental status, states more confused than usual  pt is confused at baseline

## 2020-02-05 NOTE — H&P ADULT - NSICDXPASTMEDICALHX_GEN_ALL_CORE_FT
PAST MEDICAL HISTORY:  Diabetes     High cholesterol     HIV (human immunodeficiency virus infection)     HTN (hypertension)

## 2020-02-05 NOTE — BEHAVIORAL HEALTH ASSESSMENT NOTE - HPI (INCLUDE ILLNESS QUALITY, SEVERITY, DURATION, TIMING, CONTEXT, MODIFYING FACTORS, ASSOCIATED SIGNS AND SYMPTOMS)
Patient is 65yo F domiciled in private residence with grown grandson, with PMH of HIV, HTN, DM, and PPH of Schizophrenia (as per daughter), multiple IPP admissions (mostly at [facility], and most recently several months ago), history of Crack Cocaine use disorder, Nicotine use disorder, and current use of alcohol and marijuana, who presented to the ED today BIB EMS for altered mental status. Psychiatry was consulted to evaluate patient for AMS and medication management.    Upon approach, patient is lying in ED hospital bed, noticeably malodorous. Patient is A+Ox2 and explains that her sister, Kavita brought her to the ED today for "being erratic. I was moving funny like when the music comes on." Patient also recalls "there was blood down there," pointing to her genitals, and with further questioning, she clarifies noticing blood in her urine today. Patient endorses not taking any of her medications recently, and she also endorses recent alcohol and cannabis use earlier today but is unable to elaborate on any more details. Patient also endorses 1ppd cigarette use. Patient appears confused and becomes increasingly less able to engage in a meaningful interview.    Collateral was obtained over the phone from patient's daughter, Mario Carrasco, who was with the patient at the hospital earlier in the day. She confirms that the patient's sister, Kavita, was not at the hospital despite what the patient had said during the interview. Mario states that she has not been talking to the patient for the last year until 4 weeks ago; however, she remains a relatively reliable source of information at present time. She states that the patient has been off of her psychiatric medications for almost a year now, and "this is what happens when she goes off her psych meds. She bugs out." She recalls her mother spending time inpatient at [facility] several months ago but cannot provide more information regarding that admission. Mario explains that "it all started 2 years ago when she got this boyfriend. She stopped taking her psych meds, and now she only drinks and smokes marijuana." She believes the patient's behavior has been deteriorating over the last year but noticeably has gotten worse this past week - "on Monday she walked out of the house to my brother's in a smock and slippers." Then today, "she was tearing up the house," at which point patient's grandson called 911 to have her go to the ED. Mario also endorses significant weight loss over the last year, stating, "she doesn't eat." She also does not believe her mother has been showering or taking care of herself recently as well.    Called patient's pharmacy, [facility]. Her last prescription she picked up was in April, 2019 - Haldol 10mg QHS, Cogentin 0.5mg BID, prescribed by [facility] at [facility].

## 2020-02-05 NOTE — BEHAVIORAL HEALTH ASSESSMENT NOTE - NSBHREFERDETAILS_PSY_A_CORE_FT
"history of bipolar disorder, has been off medication for few months presented to the hospital due to altered mental status and abnormal behavior."

## 2020-02-05 NOTE — ED ADULT NURSE NOTE - OBJECTIVE STATEMENT
patient states her grandson brought her to the hospital but she does not know why she is in hospital. as per MD who spoke to patient's daughter, patient is more confused than usual. patient is confused at baseline. patient denies any complaints. denies any pain.

## 2020-02-05 NOTE — BEHAVIORAL HEALTH ASSESSMENT NOTE - SUMMARY
Patient is 65yo F domiciled in private residence with grown grandson, with PMH of HIV, HTN, DM, and PPH of Schizophrenia (as per daughter), multiple IPP admissions (mostly at [facility], and most recently several months ago), history of Crack Cocaine use disorder, Nicotine use disorder, and current use of alcohol and marijuana, who presented to the ED today BIB EMS for altered mental status. Psychiatry was consulted to evaluate patient for worsening AMS and to provide medication management.    Given the information provided by collateral and the patient, she is exhibiting symptoms of acute delirium, including waxing and waning confusion, inattention, and disorganized behavior. Patient has been off of her psychiatric and medical medications for months, and as such she has been experiencing a decline in her overall health and daily functioning, which has been noticeably worsening in the last week. In addition, patient has recent history of alcohol and marijuana use, which could also be potentially contributing to her current symptoms. Patient does not warrant IPP admission at this time, but further medical workup and management is necessary to address the underlying causes of her acute presentation.    #AMS - most likely 2/2 to acute medical cause (UTI, HTN, HIV)  - Recommend further medical workup to address the underlying causes of encephalopathy  - Recommend BAL and UTOX  - Monitor for signs of alcohol withdrawal, and provide Ativan PRN for CIWA >8  - Psych will continue to follow

## 2020-02-05 NOTE — ED PROVIDER NOTE - CLINICAL SUMMARY MEDICAL DECISION MAKING FREE TEXT BOX
64 female here for inability to care for themselves, case d/w family, did medical screening with social work evaluation, will admit for placement.

## 2020-02-05 NOTE — BEHAVIORAL HEALTH ASSESSMENT NOTE - NSBHCHARTREVIEWVS_PSY_A_CORE FT
Vital Signs Last 24 Hrs  T(C): 36.1 (05 Feb 2020 15:33), Max: 37.4 (05 Feb 2020 06:53)  T(F): 97 (05 Feb 2020 15:33), Max: 99.3 (05 Feb 2020 06:53)  HR: 95 (05 Feb 2020 15:33) (88 - 97)  BP: 199/119 (05 Feb 2020 15:33) (147/78 - 199/119)  BP(mean): --  RR: 18 (05 Feb 2020 15:33) (18 - 20)  SpO2: 96% (05 Feb 2020 15:33) (96% - 100%)

## 2020-02-05 NOTE — BEHAVIORAL HEALTH ASSESSMENT NOTE - RISK ASSESSMENT
Unable to determine Suicide Risk Patient is at elevated chronic risk given her history of psychiatric illness, including Schizophrenia, as well as substance use disorders, and multiple prior IPP admissions. Her risk is exacerbated by inability to take care of herself, as well as her recent noncompliance with medication/treatment. Protective factors include familial supports.

## 2020-02-06 LAB
ALBUMIN SERPL ELPH-MCNC: 3.6 G/DL — SIGNIFICANT CHANGE UP (ref 3.5–5.2)
ALP SERPL-CCNC: 77 U/L — SIGNIFICANT CHANGE UP (ref 30–115)
ALT FLD-CCNC: 24 U/L — SIGNIFICANT CHANGE UP (ref 0–41)
ANION GAP SERPL CALC-SCNC: 16 MMOL/L — HIGH (ref 7–14)
AST SERPL-CCNC: 38 U/L — SIGNIFICANT CHANGE UP (ref 0–41)
BILIRUB SERPL-MCNC: 0.4 MG/DL — SIGNIFICANT CHANGE UP (ref 0.2–1.2)
BUN SERPL-MCNC: 12 MG/DL — SIGNIFICANT CHANGE UP (ref 10–20)
CALCIUM SERPL-MCNC: 9.5 MG/DL — SIGNIFICANT CHANGE UP (ref 8.5–10.1)
CHLORIDE SERPL-SCNC: 109 MMOL/L — SIGNIFICANT CHANGE UP (ref 98–110)
CO2 SERPL-SCNC: 16 MMOL/L — LOW (ref 17–32)
CREAT SERPL-MCNC: 1.1 MG/DL — SIGNIFICANT CHANGE UP (ref 0.7–1.5)
GLUCOSE BLDC GLUCOMTR-MCNC: 106 MG/DL — HIGH (ref 70–99)
GLUCOSE BLDC GLUCOMTR-MCNC: 137 MG/DL — HIGH (ref 70–99)
GLUCOSE SERPL-MCNC: 112 MG/DL — HIGH (ref 70–99)
HCT VFR BLD CALC: 38.5 % — SIGNIFICANT CHANGE UP (ref 37–47)
HCV AB S/CO SERPL IA: 0.19 S/CO — SIGNIFICANT CHANGE UP (ref 0–0.99)
HCV AB SERPL-IMP: SIGNIFICANT CHANGE UP
HGB BLD-MCNC: 12.7 G/DL — SIGNIFICANT CHANGE UP (ref 12–16)
MCHC RBC-ENTMCNC: 29.3 PG — SIGNIFICANT CHANGE UP (ref 27–31)
MCHC RBC-ENTMCNC: 33 G/DL — SIGNIFICANT CHANGE UP (ref 32–37)
MCV RBC AUTO: 88.7 FL — SIGNIFICANT CHANGE UP (ref 81–99)
NRBC # BLD: 0 /100 WBCS — SIGNIFICANT CHANGE UP (ref 0–0)
PLATELET # BLD AUTO: 225 K/UL — SIGNIFICANT CHANGE UP (ref 130–400)
POTASSIUM SERPL-MCNC: 4.5 MMOL/L — SIGNIFICANT CHANGE UP (ref 3.5–5)
POTASSIUM SERPL-SCNC: 4.5 MMOL/L — SIGNIFICANT CHANGE UP (ref 3.5–5)
PROT SERPL-MCNC: 7.4 G/DL — SIGNIFICANT CHANGE UP (ref 6–8)
RBC # BLD: 4.34 M/UL — SIGNIFICANT CHANGE UP (ref 4.2–5.4)
RBC # FLD: 14 % — SIGNIFICANT CHANGE UP (ref 11.5–14.5)
SODIUM SERPL-SCNC: 141 MMOL/L — SIGNIFICANT CHANGE UP (ref 135–146)
WBC # BLD: 4.93 K/UL — SIGNIFICANT CHANGE UP (ref 4.8–10.8)
WBC # FLD AUTO: 4.93 K/UL — SIGNIFICANT CHANGE UP (ref 4.8–10.8)

## 2020-02-06 RX ORDER — HALOPERIDOL DECANOATE 100 MG/ML
5 INJECTION INTRAMUSCULAR AT BEDTIME
Refills: 0 | Status: DISCONTINUED | OUTPATIENT
Start: 2020-02-06 | End: 2020-02-07

## 2020-02-06 RX ORDER — HEPARIN SODIUM 5000 [USP'U]/ML
5000 INJECTION INTRAVENOUS; SUBCUTANEOUS EVERY 8 HOURS
Refills: 0 | Status: DISCONTINUED | OUTPATIENT
Start: 2020-02-06 | End: 2020-02-07

## 2020-02-06 RX ORDER — DARUNAVIR 75 MG/1
100 TABLET, FILM COATED ORAL DAILY
Refills: 0 | Status: DISCONTINUED | OUTPATIENT
Start: 2020-02-06 | End: 2020-02-06

## 2020-02-06 RX ORDER — OXYCODONE AND ACETAMINOPHEN 5; 325 MG/1; MG/1
1 TABLET ORAL EVERY 4 HOURS
Refills: 0 | Status: DISCONTINUED | OUTPATIENT
Start: 2020-02-06 | End: 2020-02-07

## 2020-02-06 RX ORDER — DARUNAVIR 75 MG/1
0 TABLET, FILM COATED ORAL
Qty: 0 | Refills: 0 | DISCHARGE

## 2020-02-06 RX ORDER — RITONAVIR 100 MG/1
0 TABLET, FILM COATED ORAL
Qty: 0 | Refills: 0 | DISCHARGE

## 2020-02-06 RX ORDER — DOLUTEGRAVIR SODIUM 25 MG/1
50 TABLET, FILM COATED ORAL
Refills: 0 | Status: DISCONTINUED | OUTPATIENT
Start: 2020-02-06 | End: 2020-02-07

## 2020-02-06 RX ORDER — DARUNAVIR 75 MG/1
800 TABLET, FILM COATED ORAL DAILY
Refills: 0 | Status: DISCONTINUED | OUTPATIENT
Start: 2020-02-06 | End: 2020-02-07

## 2020-02-06 RX ORDER — SODIUM CHLORIDE 9 MG/ML
1000 INJECTION INTRAMUSCULAR; INTRAVENOUS; SUBCUTANEOUS
Refills: 0 | Status: DISCONTINUED | OUTPATIENT
Start: 2020-02-06 | End: 2020-02-07

## 2020-02-06 RX ORDER — THIAMINE MONONITRATE (VIT B1) 100 MG
100 TABLET ORAL DAILY
Refills: 0 | Status: DISCONTINUED | OUTPATIENT
Start: 2020-02-06 | End: 2020-02-07

## 2020-02-06 RX ORDER — PANTOPRAZOLE SODIUM 20 MG/1
40 TABLET, DELAYED RELEASE ORAL
Refills: 0 | Status: DISCONTINUED | OUTPATIENT
Start: 2020-02-06 | End: 2020-02-06

## 2020-02-06 RX ORDER — HALOPERIDOL DECANOATE 100 MG/ML
10 INJECTION INTRAMUSCULAR AT BEDTIME
Refills: 0 | Status: DISCONTINUED | OUTPATIENT
Start: 2020-02-06 | End: 2020-02-06

## 2020-02-06 RX ORDER — TAMSULOSIN HYDROCHLORIDE 0.4 MG/1
0.4 CAPSULE ORAL AT BEDTIME
Refills: 0 | Status: DISCONTINUED | OUTPATIENT
Start: 2020-02-06 | End: 2020-02-07

## 2020-02-06 RX ORDER — RITONAVIR 100 MG/1
100 TABLET, FILM COATED ORAL DAILY
Refills: 0 | Status: DISCONTINUED | OUTPATIENT
Start: 2020-02-06 | End: 2020-02-07

## 2020-02-06 RX ORDER — ATORVASTATIN CALCIUM 80 MG/1
10 TABLET, FILM COATED ORAL AT BEDTIME
Refills: 0 | Status: DISCONTINUED | OUTPATIENT
Start: 2020-02-06 | End: 2020-02-07

## 2020-02-06 RX ORDER — CHLORHEXIDINE GLUCONATE 213 G/1000ML
1 SOLUTION TOPICAL
Refills: 0 | Status: DISCONTINUED | OUTPATIENT
Start: 2020-02-06 | End: 2020-02-07

## 2020-02-06 RX ORDER — BENZTROPINE MESYLATE 1 MG
0.5 TABLET ORAL EVERY 12 HOURS
Refills: 0 | Status: DISCONTINUED | OUTPATIENT
Start: 2020-02-06 | End: 2020-02-07

## 2020-02-06 RX ORDER — FOLIC ACID 0.8 MG
1 TABLET ORAL DAILY
Refills: 0 | Status: DISCONTINUED | OUTPATIENT
Start: 2020-02-06 | End: 2020-02-07

## 2020-02-06 RX ADMIN — Medication 1 MILLIGRAM(S): at 18:07

## 2020-02-06 RX ADMIN — RITONAVIR 100 MILLIGRAM(S): 100 TABLET, FILM COATED ORAL at 14:23

## 2020-02-06 RX ADMIN — Medication 100 MILLIGRAM(S): at 18:07

## 2020-02-06 RX ADMIN — DOLUTEGRAVIR SODIUM 50 MILLIGRAM(S): 25 TABLET, FILM COATED ORAL at 18:08

## 2020-02-06 RX ADMIN — HALOPERIDOL DECANOATE 5 MILLIGRAM(S): 100 INJECTION INTRAMUSCULAR at 21:54

## 2020-02-06 RX ADMIN — Medication 50 MILLIGRAM(S): at 06:31

## 2020-02-06 RX ADMIN — Medication 0.5 MILLIGRAM(S): at 18:08

## 2020-02-06 RX ADMIN — HEPARIN SODIUM 5000 UNIT(S): 5000 INJECTION INTRAVENOUS; SUBCUTANEOUS at 21:54

## 2020-02-06 RX ADMIN — ATORVASTATIN CALCIUM 10 MILLIGRAM(S): 80 TABLET, FILM COATED ORAL at 21:54

## 2020-02-06 RX ADMIN — Medication 1 TABLET(S): at 11:53

## 2020-02-06 RX ADMIN — PANTOPRAZOLE SODIUM 40 MILLIGRAM(S): 20 TABLET, DELAYED RELEASE ORAL at 06:31

## 2020-02-06 RX ADMIN — DARUNAVIR 800 MILLIGRAM(S): 75 TABLET, FILM COATED ORAL at 14:23

## 2020-02-06 RX ADMIN — SODIUM CHLORIDE 75 MILLILITER(S): 9 INJECTION INTRAMUSCULAR; INTRAVENOUS; SUBCUTANEOUS at 14:22

## 2020-02-06 RX ADMIN — SODIUM CHLORIDE 75 MILLILITER(S): 9 INJECTION INTRAMUSCULAR; INTRAVENOUS; SUBCUTANEOUS at 14:24

## 2020-02-06 RX ADMIN — TAMSULOSIN HYDROCHLORIDE 0.4 MILLIGRAM(S): 0.4 CAPSULE ORAL at 21:55

## 2020-02-06 NOTE — PROGRESS NOTE ADULT - ASSESSMENT
ASSESSMENT  64F with AIDS non-adherent to ART, HTN, DM, Bipolar/schizophrenia, prior brain aneurysm s/p coil (seen on CTH) admitted by family for encephalopathy    IMPRESSION/RECOMMENDATIONS  #Encephalopathy, suspect 2/2 underlying untreated Psych disorder, current drug use (marijuana, ETOH). While CD4 is 101 and pt is at risk of opportunistic infections, she is afebrile, exam is nonfocal and CTH does not show any lesions, only chronic changes. She denies HA. 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 Would see Toxo, CNS lymphoma on imaging and would usually present with focal deficits, doubt crypto as no HA or fever  - Utox, BAL <10  - Seen by Psych, did not make any med recs  - Called [facility], restart home haldol/cogentin doses  - Would hold off LP at this time  - Start thiamine/folate given ETOH use    #AIDS CD4 101; 4%,  1/22/20  - Restart Dolutegravir 50mg daily, Darunavir 800mg daily, ritonavir 100mg daily  - Bactrim 1 DS daily for PCP PPX  - Follows with Coral Navarro    #HTN- monitor    #HLD- atorvastatin 10mg    #DM- on metformin at home  - REENA    Spectra [phone] ASSESSMENT  64F with AIDS non-adherent to ART, HTN, DM, Bipolar/schizophrenia, prior brain aneurysm s/p coil (seen on CTH) admitted by family for encephalopathy    IMPRESSION/RECOMMENDATIONS  #Encephalopathy, suspect 2/2 underlying untreated Psych disorder, current drug use (marijuana, ETOH). While CD4 is 101 and pt is at risk of opportunistic infections, she is afebrile, exam is nonfocal and CTH does not show any lesions, only chronic changes. She denies HA. 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 Would see Toxo, CNS lymphoma on imaging and would usually present with focal deficits, doubt crypto as no HA or fever  - Utox, BAL <10  - Seen by Psych, did not make any med recs  - Discussed with Psych attending, cogentin 0.5 mg BID, haldol 5mg QHS, add propranolol 10mg BID for akathesia---, may change hadol to seroquel  - Would hold off LP at this time  - Start thiamine/folate given ETOH use    #AIDS CD4 101; 4%,  1/22/20  - Restart Dolutegravir 50mg daily, Darunavir 800mg daily, ritonavir 100mg daily  - Bactrim 1 DS daily for PCP PPX  - Follows with Coral Navarro    #HTN- monitor    #HLD- atorvastatin 10mg    #DM- on metformin at home  - REENA    Spectra [phone]

## 2020-02-06 NOTE — PROGRESS NOTE ADULT - SUBJECTIVE AND OBJECTIVE BOX
SUBJECTIVE:    Patient is a 64y old Female who presents with a chief complaint of Altered mental status/inability to take care of herself (2020 13:47)    Currently admitted to medicine with the primary diagnosis of Inadequate social support     Today is hospital day 1d. This morning she is resting comfortably in bed and reports no new issues or overnight events.     PAST MEDICAL & SURGICAL HISTORY  High cholesterol  Diabetes  HTN (hypertension)  HIV (human immunodeficiency virus infection)    SOCIAL HISTORY:  Negative for smoking/alcohol/drug use.     ALLERGIES:  No Known Allergies    MEDICATIONS:  STANDING MEDICATIONS  chlorhexidine 4% Liquid 1 Application(s) Topical <User Schedule>  heparin  Injectable 5000 Unit(s) SubCutaneous every 8 hours  pantoprazole    Tablet 40 milliGRAM(s) Oral before breakfast  predniSONE   Tablet 50 milliGRAM(s) Oral daily  tamsulosin 0.4 milliGRAM(s) Oral at bedtime    PRN MEDICATIONS  oxycodone    5 mG/acetaminophen 325 mG 1 Tablet(s) Oral every 4 hours PRN    VITALS:   T(F): 97  HR: 101  BP: 168/84  RR: 20  SpO2: 96%    LABS:                        12.4   6.13  )-----------( 206      ( 2020 08:10 )             36.4     02-    141  |  108  |  15  ----------------------------<  102<H>  4.7   |  20  |  1.4    Ca    9.7      2020 08:10  Mg     1.8     -    TPro  7.7  /  Alb  3.7  /  TBili  0.3  /  DBili  x   /  AST  49<H>  /  ALT  28  /  AlkPhos  79  -      Urinalysis Basic - ( 2020 09:45 )    Color: Yellow / Appearance: Slightly Turbid / S.017 / pH: x  Gluc: x / Ketone: Trace  / Bili: Negative / Urobili: <2 mg/dL   Blood: x / Protein: 30 mg/dL / Nitrite: Positive   Leuk Esterase: Negative / RBC: 2 /HPF / WBC 2 /HPF   Sq Epi: x / Non Sq Epi: 1 /HPF / Bacteria: Many        Lactate, Blood: 1.5 mmol/L (20 @ 08:10)  Troponin T, Serum: <0.01 ng/mL (20 @ 08:10)      CARDIAC MARKERS ( 2020 08:10 )  x     / <0.01 ng/mL / x     / x     / x          RADIOLOGY:  < from: Xray Chest 1 View- PORTABLE-Urgent (20 @ 09:38) >  Impression:      No radiographic evidence of acute cardiopulmonary disease.    < end of copied text >    PHYSICAL EXAM:  GEN: No acute distress  LUNGS: Clear to auscultation bilaterally   HEART: S1/S2 present. RRR.   ABD: Soft, non-tender, non-distended. Bowel sounds present  EXT: NC/NC/NE/2+PP/MERCADO  NEURO: AAOX3 SUBJECTIVE:    Patient is a 64y old Female who presents with a chief complaint of Altered mental status/inability to take care of herself (2020 13:47)    Currently admitted to medicine with the primary diagnosis of Inadequate social support     Today is hospital day 1d. This morning she is resting comfortably in bed and reports no new issues or overnight events.     PAST MEDICAL & SURGICAL HISTORY  High cholesterol  Diabetes  HTN (hypertension)  HIV (human immunodeficiency virus infection)    SOCIAL HISTORY:  Negative for smoking/alcohol/drug use.     ALLERGIES:  No Known Allergies    MEDICATIONS:  STANDING MEDICATIONS  chlorhexidine 4% Liquid 1 Application(s) Topical <User Schedule>  heparin  Injectable 5000 Unit(s) SubCutaneous every 8 hours  pantoprazole    Tablet 40 milliGRAM(s) Oral before breakfast  predniSONE   Tablet 50 milliGRAM(s) Oral daily  tamsulosin 0.4 milliGRAM(s) Oral at bedtime    PRN MEDICATIONS  oxycodone    5 mG/acetaminophen 325 mG 1 Tablet(s) Oral every 4 hours PRN    VITALS:   T(F): 97  HR: 101  BP: 168/84  RR: 20  SpO2: 96%    LABS:                        12.4   6.13  )-----------( 206      ( 2020 08:10 )             36.4     02-    141  |  108  |  15  ----------------------------<  102<H>  4.7   |  20  |  1.4    Ca    9.7      2020 08:10  Mg     1.8     -    TPro  7.7  /  Alb  3.7  /  TBili  0.3  /  DBili  x   /  AST  49<H>  /  ALT  28  /  AlkPhos  79  -      Urinalysis Basic - ( 2020 09:45 )    Color: Yellow / Appearance: Slightly Turbid / S.017 / pH: x  Gluc: x / Ketone: Trace  / Bili: Negative / Urobili: <2 mg/dL   Blood: x / Protein: 30 mg/dL / Nitrite: Positive   Leuk Esterase: Negative / RBC: 2 /HPF / WBC 2 /HPF   Sq Epi: x / Non Sq Epi: 1 /HPF / Bacteria: Many        Lactate, Blood: 1.5 mmol/L (20 @ 08:10)  Troponin T, Serum: <0.01 ng/mL (20 @ 08:10)      CARDIAC MARKERS ( 2020 08:10 )  x     / <0.01 ng/mL / x     / x     / x          RADIOLOGY:  < from: Xray Chest 1 View- PORTABLE-Urgent (20 @ 09:38) >  Impression:      No radiographic evidence of acute cardiopulmonary disease.    < end of copied text >    PHYSICAL EXAM:  GENERAL: NAD, Lying in bed  HEENT: AT, NC, PERRLA  NECK: Supple, no JVD, no lymphadenopathy  Heart: S1/S2 appreciated no murmurs   Abdomen: soft, nondistended  Neuro: Alert and oriented*3, pressured speech SUBJECTIVE:    Patient is a 64y old Female who presents with a chief complaint of Altered mental status/inability to take care of herself (2020 13:47)    Currently admitted to medicine with the primary diagnosis of Inadequate social support     Today is hospital day 1d. This morning she is resting comfortably in bed and reports no new issues or overnight events. Pt is AA) x 2, unable to lie still in bed. She is moving erratically. When asked about the reason for hospitalization, pt reported that she is here to " a package"    PAST MEDICAL & SURGICAL HISTORY  High cholesterol  Diabetes  HTN (hypertension)  HIV (human immunodeficiency virus infection)    SOCIAL HISTORY:  Negative for smoking/alcohol/drug use.     ALLERGIES:  No Known Allergies    MEDICATIONS:  STANDING MEDICATIONS  chlorhexidine 4% Liquid 1 Application(s) Topical <User Schedule>  heparin  Injectable 5000 Unit(s) SubCutaneous every 8 hours  pantoprazole    Tablet 40 milliGRAM(s) Oral before breakfast  predniSONE   Tablet 50 milliGRAM(s) Oral daily  tamsulosin 0.4 milliGRAM(s) Oral at bedtime    PRN MEDICATIONS  oxycodone    5 mG/acetaminophen 325 mG 1 Tablet(s) Oral every 4 hours PRN    VITALS:   T(F): 97  HR: 101  BP: 168/84  RR: 20  SpO2: 96%    LABS:                        12.4   6.13  )-----------( 206      ( 2020 08:10 )             36.4     02-05    141  |  108  |  15  ----------------------------<  102<H>  4.7   |  20  |  1.4    Ca    9.7      2020 08:10  Mg     1.8     02-05    TPro  7.7  /  Alb  3.7  /  TBili  0.3  /  DBili  x   /  AST  49<H>  /  ALT  28  /  AlkPhos  79  02-05      Urinalysis Basic - ( 2020 09:45 )    Color: Yellow / Appearance: Slightly Turbid / S.017 / pH: x  Gluc: x / Ketone: Trace  / Bili: Negative / Urobili: <2 mg/dL   Blood: x / Protein: 30 mg/dL / Nitrite: Positive   Leuk Esterase: Negative / RBC: 2 /HPF / WBC 2 /HPF   Sq Epi: x / Non Sq Epi: 1 /HPF / Bacteria: Many        Lactate, Blood: 1.5 mmol/L (20 @ 08:10)  Troponin T, Serum: <0.01 ng/mL (20 @ 08:10)      CARDIAC MARKERS ( 2020 08:10 )  x     / <0.01 ng/mL / x     / x     / x          RADIOLOGY:  < from: Xray Chest 1 View- PORTABLE-Urgent (20 @ 09:38) >  Impression:      No radiographic evidence of acute cardiopulmonary disease.    < end of copied text >    PHYSICAL EXAM:  Gen: chronically ill appearing NAD, resting in bed  HEENT: Normocephalic, atraumatic, poor dentition  Neck: supple, no lymphadenopathy  CV: Regular rate & regular rhythm  Lungs: CTAB  Abdomen: Soft, BS present  Ext: Warm, well perfused

## 2020-02-06 NOTE — PROGRESS NOTE ADULT - SUBJECTIVE AND OBJECTIVE BOX
JAMLENA NICOLE  64y, Female  Allergy: No Known Allergies      CHIEF COMPLAINT: Altered mental status/inability to take care of herself (2020 07:58)      HPI:  This is a 64 year old F patient with past medical history of HTN, DM and HIV ( was on HAART ) and Bipolar/schizophrenic?  who was brought to the ED by EMS for altered mental status. The patient is a poor historian and unable to participate in the history, upon talking to the family they stated that the patient is not at her baseline and she can no longer take care of herself. She has been off her psychiatric and HIV medications for months as per the daughter at bedside.     They denied she having any recent illness, cough, diarrhea, or any complaints during urination or any other symptoms (2020 13:47)      INFECTIOUS DISEASE HISTORY:  Hx AIDS, recently non-compliant with meds  Pt is oriented x 3 but is also tangential, talking about her sisters, telling me to tell her sister she is on the bus, but then when I ask where she is, she states [facility]  Per pharmacy on DTG, DRV/r   CD4 101; 4%     Pt denies HA, photophobia,  rhinorrhea, sore throat, cough, SOB, abd pain, diarrhea, n/v, dysuria, rash, arthralgias.     PAST MEDICAL & SURGICAL HISTORY:  High cholesterol  Diabetes  HTN (hypertension)  HIV (human immunodeficiency virus infection)      FAMILY HISTORY  non-contributory     SOCIAL HISTORY    Substance Use (  ) never used  (  ) IVDU (x ) Other: marijuana, hx crack use   Tobacco Usage:  (   ) never smoked   (   ) former smoker   (  x ) current smoker   Alcohol Usage: (   ) social  ( x  ) daily use (   ) denies  Sexual History: na      ROS  General: Denies rigors, nightsweats  HEENT: Denies headache, rhinorrhea, sore throat, eye pain  CV: Denies CP, palpitations  PULM: Denies wheezing, hemoptysis  GI: Denies hematemesis, hematochezia, melena  : Denies discharge, hematuria  MSK: Denies arthralgias, myalgias  SKIN: Denies rash, lesions  NEURO: Denies paresthesias, weakness  PSYCH: Denies depression, anxiety    VITALS:  T(F): 97, Max: 97 (20 @ 15:33)  HR: 101  BP: 168/84  RR: 20Vital Signs Last 24 Hrs  T(C): 36.1 (2020 05:15), Max: 36.1 (2020 15:33)  T(F): 97 (2020 05:15), Max: 97 (2020 15:33)  HR: 101 (2020 05:15) (95 - 101)  BP: 168/84 (2020 05:15) (168/84 - 199/119)  BP(mean): --  RR: 20 (2020 05:15) (18 - 20)  SpO2: 96% (2020 15:33) (96% - 96%)    PHYSICAL EXAM:  Gen: chronically ill appearing NAD, resting in bed  HEENT: Normocephalic, atraumatic, poor dentition  Neck: supple, no lymphadenopathy  CV: Regular rate & regular rhythm  Lungs: CTAB  Abdomen: Soft, BS present  Ext: Warm, well perfused  Neuro: non focal, awake AA&Ox3, writhing in bed, akathesia , writhing moments of the hands. strength equal   Skin: no rash, no erythema  Lines: no phlebitis    TESTS & MEASUREMENTS:                        12.7   4.93  )-----------( 225      ( 2020 08:12 )             38.5     02-    141  |  109  |  12  ----------------------------<  112<H>  4.5   |  16<L>  |  1.1    Ca    9.5      2020 08:12  Mg     1.8         TPro  7.4  /  Alb  3.6  /  TBili  0.4  /  DBili  x   /  AST  38  /  ALT  24  /  AlkPhos  77  02-    eGFR if Non African American: 54 mL/min/1.73M2 (20 @ 08:12)  eGFR if : 62 mL/min/1.73M2 (20 @ 08:12)    LIVER FUNCTIONS - ( 2020 08:12 )  Alb: 3.6 g/dL / Pro: 7.4 g/dL / ALK PHOS: 77 U/L / ALT: 24 U/L / AST: 38 U/L / GGT: x           Urinalysis Basic - ( 2020 09:45 )    Color: Yellow / Appearance: Slightly Turbid / S.017 / pH: x  Gluc: x / Ketone: Trace  / Bili: Negative / Urobili: <2 mg/dL   Blood: x / Protein: 30 mg/dL / Nitrite: Positive   Leuk Esterase: Negative / RBC: 2 /HPF / WBC 2 /HPF   Sq Epi: x / Non Sq Epi: 1 /HPF / Bacteria: Many          Lactate, Blood: 1.5 mmol/L (20 @ 08:10)      INFECTIOUS DISEASES TESTING      RADIOLOGY & ADDITIONAL TESTS:  I have personally reviewed the last Chest xray  CXR  Xray Chest 1 View- PORTABLE-Urgent:   EXAM:  XR CHEST PORTABLE URGENT 1V            PROCEDURE DATE:  2020            INTERPRETATION:  Clinical History / Reason for exam: Altered mental status    Comparison : Chest radiograph 2018.    Technique/Positioning: Frontal, portable.    Findings:    Support devices: None.    Cardiac/mediastinum/hilum: Unremarkable.    Lung parenchyma/Pleura: Within normal limits.    Skeleton/soft tissues: Degenerative change    Impression:      No radiographic evidence of acute cardiopulmonary disease.                      KATHY COLEMAN M.D., ATTENDING RADIOLOGIST  This document has been electronically signed. 2020  9:52AM             (20 @ 09:38)      CT      CARDIOLOGY TESTING  12 Lead ECG:   Ventricular Rate 92 BPM    Atrial Rate 92 BPM    P-R Interval 114 ms    QRS Duration 70 ms    Q-T Interval 368 ms    QTC Calculation(Bezet) 455 ms    P Axis 68 degrees    R Axis 26 degrees    T Axis 31 degrees    Diagnosis Line Normal sinus rhythm  Possible Left atrial enlargement  RSR' or QR pattern in V1 suggests right ventricular conduction delay  Abnormal ECG    Confirmed by Parviz Wesley ([id number]) on 2020 2:14:52 PM (20 @ 11:17)      MEDICATIONS  chlorhexidine 4% Liquid 1  heparin  Injectable 5000  pantoprazole    Tablet 40  predniSONE   Tablet 50  sodium chloride 0.9%. 1000  tamsulosin 0.4  trimethoprim  160 mG/sulfamethoxazole 800 mG 1      ANTIBIOTICS:  trimethoprim  160 mG/sulfamethoxazole 800 mG 1 Tablet(s) Oral daily      ALLERGIES:  No Known Allergies

## 2020-02-06 NOTE — PROGRESS NOTE ADULT - ASSESSMENT
This is a 64 year old F pt with PMHx of HTN, DM, HIV ( off medications for few months) and psychiatric history of bipolar disorder present to the ED for Altered mental status     # Altered mental status likely secondary to acute psychosis vs delirium due to acute metabolic encephalopathy  - no fever, leukocytosis or signs of sepsis  - CT head was negative for any acute inta cranial pathology, CXR WNL  - UA negative for blood, LKE, positive for nitrite abd bacteruria  - serum alcohol < 10 WNL, patient has history of alcohol abuse  - patient has been off psychiatric medications for the last few months   - psych recommends medical workup for delirium due to possible UTI vs HTN urgency vs HIV- pt reports seeing blood in urine as per psych note  - f/u UCX, urine toxicology  - Will get in touch with [facility] to obtain the medical records    # HTN  -Neither the family or the patient know what medications the patient is taking   -Will obtain the medical records    # HIV  -Patient has been off medications for few months   -T cell count and viral load were ordered, follow up the results     # DM  - Off oral medications  - Monitor the blood sugar, start insulin if above 180    #DVT ppx: Heparin Subq  #Diet: Carb consistent   #GI prophylaxis: protonix  #Dispo: pending Psych evaluation   #Full code This is a 64 year old F pt with PMHx of HTN, DM, HIV ( off medications for few months) and psychiatric history of bipolar disorder/ schizophrenia and substance abuse present to the ED for Altered mental status. Pt has been non compliant with medications and exhibiting unusual behavior over past months.    # Altered mental status likely secondary to acute psychosis vs delirium due to acute metabolic encephalopathy  - no fever, leukocytosis or signs of sepsis  - CT head was negative for any acute inta cranial pathology, CXR WNL  - UA negative for blood, LKE, positive for nitrite abd bacteruria  - serum alcohol < 10 WNL, patient has history of alcohol abuse  - patient has been off psychiatric medications for the last few months   - psych recommends medical workup for delirium due to possible UTI vs HTN urgency vs HIV- pt reports seeing blood in urine as per psych note  - f/u UCX, urine toxicology  - Will get in touch with [facility] to obtain the medical records    # HTN  -Neither the family or the patient know what medications the patient is taking   -Will obtain the medical records    # HIV  -Patient has been off medications for few months   -T cell count and viral load were ordered, follow up the results     # DM  - Off oral medications  - Monitor the blood sugar, start insulin if above 180    #DVT ppx: Heparin Subq  #Diet: Carb consistent   #GI prophylaxis: protonix  #Dispo: pending Psych evaluation   #Full code This is a 64 year old F pt with PMHx of HTN, DM, HIV ( off medications for few months) and psychiatric history of bipolar disorder/ schizophrenia and substance abuse present to the ED for Altered mental status. Pt has been non compliant with medications and exhibiting unusual behavior over past months.    # Altered mental status likely secondary to acute psychosis vs delirium due to acute metabolic encephalopathy  - no fever, leukocytosis or signs of sepsis  - CT head was negative for any acute inta cranial pathology, CXR WNL  - UA negative for blood, LKE, positive for nitrite abd bacteruria  - serum alcohol (BAL) < 10 WNL, patient has history of alcohol abuse, will monitor for withdrawal, start thiamine and folic acid  - restart Haldol 5mg q24, cogentin 0,5mg q12, and add propranolol 10mg q12 for akathisia   - psych recommends medical workup for delirium due to possible UTI vs HTN urgency vs HIV- pt reports seeing blood in urine as per psych note  - f/u UCX, urine toxicology, RPR, cryptococcal antigen    # HTN  - not on any medications  - monitor for now, if BP stays persistently high, will start lisinopril.    # HIV  - CD4 101, viral load 340 1/22  - restart HIV meds Prezista 100mg q24, Tivicay 50mg q12, Norvir 100mg q24.   - bactrim ds q24 for PCP ppx  - f/u ID    # DM  - Off oral medications  - Monitor the blood sugar, start insulin if above 180    # DLD  - c/w atorvastatin 10mg q24      #Diet: Carb consistent   #DVT ppx: Heparin Subq  #GI prophylaxis: protonix  #Dispo: pending Psych evaluation   #Full code This is a 64 year old F pt with PMHx of HTN, DM, HIV ( off medications for few months) and psychiatric history of bipolar disorder/ schizophrenia and substance abuse present to the ED for Altered mental status. Pt has been non compliant with medications and exhibiting unusual behavior over past months.    # Altered mental status likely secondary to acute psychosis vs delirium due to acute metabolic encephalopathy  - no fever, leukocytosis or signs of sepsis  - CT head was negative for any acute inta cranial pathology, CXR WNL  - UA negative for blood, LKE, positive for nitrite abd bacteruria  - serum alcohol (BAL) < 10 WNL, patient has history of alcohol abuse, will monitor for withdrawal, start thiamine and folic acid  - restart Haldol 5mg q24, cogentin 0,5mg q12, and add propranolol 10mg q12 for akathisia   - psych recommends medical workup for delirium due to possible UTI vs HTN urgency vs HIV- pt reports seeing blood in urine as per psych note  - f/u UCX, urine toxicology, RPR, cryptococcal antigen    # HIV  - CD4 101, viral load 340 1/22  - restart HIV meds Prezista 100mg q24, Tivicay 50mg q12, Norvir 100mg q24.   - bactrim ds q24 for PCP ppx  - f/u ID    # HTN  - not on any medications  - monitor for now, if BP stays persistently high, will start lisinopril.    # DM  - Off oral medications  - Monitor the blood sugar, start insulin if above 180    # DLD  - c/w atorvastatin 10mg q24    #Diet: Carb consistent   #DVT ppx: Heparin Subq  #GI prophylaxis: protonix  #Dispo: pending Psych evaluation   #Full code

## 2020-02-07 ENCOUNTER — TRANSCRIPTION ENCOUNTER (OUTPATIENT)
Age: 65
End: 2020-02-07

## 2020-02-07 ENCOUNTER — INPATIENT (INPATIENT)
Facility: HOSPITAL | Age: 65
LOS: 19 days | Discharge: HOME | End: 2020-02-27
Attending: PSYCHIATRY & NEUROLOGY | Admitting: PSYCHIATRY & NEUROLOGY
Payer: MEDICAID

## 2020-02-07 VITALS — TEMPERATURE: 98 F | HEART RATE: 97 BPM | WEIGHT: 129.63 LBS | HEIGHT: 64 IN | RESPIRATION RATE: 16 BRPM

## 2020-02-07 VITALS
SYSTOLIC BLOOD PRESSURE: 116 MMHG | HEART RATE: 87 BPM | RESPIRATION RATE: 18 BRPM | TEMPERATURE: 98 F | DIASTOLIC BLOOD PRESSURE: 69 MMHG

## 2020-02-07 DIAGNOSIS — R41.89 OTHER SYMPTOMS AND SIGNS INVOLVING COGNITIVE FUNCTIONS AND AWARENESS: ICD-10-CM

## 2020-02-07 DIAGNOSIS — E11.9 TYPE 2 DIABETES MELLITUS WITHOUT COMPLICATIONS: ICD-10-CM

## 2020-02-07 DIAGNOSIS — F19.20 OTHER PSYCHOACTIVE SUBSTANCE DEPENDENCE, UNCOMPLICATED: ICD-10-CM

## 2020-02-07 DIAGNOSIS — B20 HUMAN IMMUNODEFICIENCY VIRUS [HIV] DISEASE: ICD-10-CM

## 2020-02-07 DIAGNOSIS — I10 ESSENTIAL (PRIMARY) HYPERTENSION: ICD-10-CM

## 2020-02-07 DIAGNOSIS — F29 UNSPECIFIED PSYCHOSIS NOT DUE TO A SUBSTANCE OR KNOWN PHYSIOLOGICAL CONDITION: ICD-10-CM

## 2020-02-07 DIAGNOSIS — F39 UNSPECIFIED MOOD [AFFECTIVE] DISORDER: ICD-10-CM

## 2020-02-07 LAB
-  AMIKACIN: SIGNIFICANT CHANGE UP
-  AMPICILLIN/SULBACTAM: SIGNIFICANT CHANGE UP
-  AMPICILLIN: SIGNIFICANT CHANGE UP
-  AZTREONAM: SIGNIFICANT CHANGE UP
-  CEFAZOLIN: SIGNIFICANT CHANGE UP
-  CEFEPIME: SIGNIFICANT CHANGE UP
-  CEFOXITIN: SIGNIFICANT CHANGE UP
-  CEFTRIAXONE: SIGNIFICANT CHANGE UP
-  CIPROFLOXACIN: SIGNIFICANT CHANGE UP
-  GENTAMICIN: SIGNIFICANT CHANGE UP
-  IMIPENEM: SIGNIFICANT CHANGE UP
-  LEVOFLOXACIN: SIGNIFICANT CHANGE UP
-  MEROPENEM: SIGNIFICANT CHANGE UP
-  NITROFURANTOIN: SIGNIFICANT CHANGE UP
-  PIPERACILLIN/TAZOBACTAM: SIGNIFICANT CHANGE UP
-  TIGECYCLINE: SIGNIFICANT CHANGE UP
-  TOBRAMYCIN: SIGNIFICANT CHANGE UP
-  TRIMETHOPRIM/SULFAMETHOXAZOLE: SIGNIFICANT CHANGE UP
ALBUMIN SERPL ELPH-MCNC: 3.3 G/DL — LOW (ref 3.5–5.2)
ALP SERPL-CCNC: 67 U/L — SIGNIFICANT CHANGE UP (ref 30–115)
ALT FLD-CCNC: 20 U/L — SIGNIFICANT CHANGE UP (ref 0–41)
AMPHET UR-MCNC: NEGATIVE — SIGNIFICANT CHANGE UP
ANION GAP SERPL CALC-SCNC: 12 MMOL/L — SIGNIFICANT CHANGE UP (ref 7–14)
AST SERPL-CCNC: 29 U/L — SIGNIFICANT CHANGE UP (ref 0–41)
BARBITURATES UR SCN-MCNC: NEGATIVE — SIGNIFICANT CHANGE UP
BASOPHILS # BLD AUTO: 0.01 K/UL — SIGNIFICANT CHANGE UP (ref 0–0.2)
BASOPHILS NFR BLD AUTO: 0.2 % — SIGNIFICANT CHANGE UP (ref 0–1)
BENZODIAZ UR-MCNC: NEGATIVE — SIGNIFICANT CHANGE UP
BILIRUB SERPL-MCNC: 0.3 MG/DL — SIGNIFICANT CHANGE UP (ref 0.2–1.2)
BUN SERPL-MCNC: 12 MG/DL — SIGNIFICANT CHANGE UP (ref 10–20)
CALCIUM SERPL-MCNC: 9.4 MG/DL — SIGNIFICANT CHANGE UP (ref 8.5–10.1)
CHLORIDE SERPL-SCNC: 109 MMOL/L — SIGNIFICANT CHANGE UP (ref 98–110)
CO2 SERPL-SCNC: 19 MMOL/L — SIGNIFICANT CHANGE UP (ref 17–32)
COCAINE METAB.OTHER UR-MCNC: NEGATIVE — SIGNIFICANT CHANGE UP
CREAT SERPL-MCNC: 1.1 MG/DL — SIGNIFICANT CHANGE UP (ref 0.7–1.5)
CRYPTOC AG FLD QL: NEGATIVE — SIGNIFICANT CHANGE UP
CULTURE RESULTS: SIGNIFICANT CHANGE UP
EOSINOPHIL # BLD AUTO: 0.01 K/UL — SIGNIFICANT CHANGE UP (ref 0–0.7)
EOSINOPHIL NFR BLD AUTO: 0.2 % — SIGNIFICANT CHANGE UP (ref 0–8)
GLUCOSE BLDC GLUCOMTR-MCNC: 126 MG/DL — HIGH (ref 70–99)
GLUCOSE BLDC GLUCOMTR-MCNC: 127 MG/DL — HIGH (ref 70–99)
GLUCOSE BLDC GLUCOMTR-MCNC: 142 MG/DL — HIGH (ref 70–99)
GLUCOSE SERPL-MCNC: 112 MG/DL — HIGH (ref 70–99)
HCT VFR BLD CALC: 35 % — LOW (ref 37–47)
HGB BLD-MCNC: 11.5 G/DL — LOW (ref 12–16)
IMM GRANULOCYTES NFR BLD AUTO: 0.6 % — HIGH (ref 0.1–0.3)
LYMPHOCYTES # BLD AUTO: 1.96 K/UL — SIGNIFICANT CHANGE UP (ref 1.2–3.4)
LYMPHOCYTES # BLD AUTO: 37.5 % — SIGNIFICANT CHANGE UP (ref 20.5–51.1)
MCHC RBC-ENTMCNC: 29.1 PG — SIGNIFICANT CHANGE UP (ref 27–31)
MCHC RBC-ENTMCNC: 32.9 G/DL — SIGNIFICANT CHANGE UP (ref 32–37)
MCV RBC AUTO: 88.6 FL — SIGNIFICANT CHANGE UP (ref 81–99)
METHADONE UR-MCNC: NEGATIVE — SIGNIFICANT CHANGE UP
METHOD TYPE: SIGNIFICANT CHANGE UP
MONOCYTES # BLD AUTO: 0.55 K/UL — SIGNIFICANT CHANGE UP (ref 0.1–0.6)
MONOCYTES NFR BLD AUTO: 10.5 % — HIGH (ref 1.7–9.3)
NEUTROPHILS # BLD AUTO: 2.66 K/UL — SIGNIFICANT CHANGE UP (ref 1.4–6.5)
NEUTROPHILS NFR BLD AUTO: 51 % — SIGNIFICANT CHANGE UP (ref 42.2–75.2)
NRBC # BLD: 0 /100 WBCS — SIGNIFICANT CHANGE UP (ref 0–0)
OPIATES UR-MCNC: NEGATIVE — SIGNIFICANT CHANGE UP
ORGANISM # SPEC MICROSCOPIC CNT: SIGNIFICANT CHANGE UP
ORGANISM # SPEC MICROSCOPIC CNT: SIGNIFICANT CHANGE UP
PCP SPEC-MCNC: SIGNIFICANT CHANGE UP
PLATELET # BLD AUTO: 204 K/UL — SIGNIFICANT CHANGE UP (ref 130–400)
POTASSIUM SERPL-MCNC: 4.4 MMOL/L — SIGNIFICANT CHANGE UP (ref 3.5–5)
POTASSIUM SERPL-SCNC: 4.4 MMOL/L — SIGNIFICANT CHANGE UP (ref 3.5–5)
PROPOXYPHENE QUALITATIVE URINE RESULT: NEGATIVE — SIGNIFICANT CHANGE UP
PROT SERPL-MCNC: 6.7 G/DL — SIGNIFICANT CHANGE UP (ref 6–8)
RBC # BLD: 3.95 M/UL — LOW (ref 4.2–5.4)
RBC # FLD: 14.1 % — SIGNIFICANT CHANGE UP (ref 11.5–14.5)
SODIUM SERPL-SCNC: 140 MMOL/L — SIGNIFICANT CHANGE UP (ref 135–146)
SPECIMEN SOURCE: SIGNIFICANT CHANGE UP
WBC # BLD: 5.22 K/UL — SIGNIFICANT CHANGE UP (ref 4.8–10.8)
WBC # FLD AUTO: 5.22 K/UL — SIGNIFICANT CHANGE UP (ref 4.8–10.8)

## 2020-02-07 PROCEDURE — 70450 CT HEAD/BRAIN W/O DYE: CPT | Mod: 26

## 2020-02-07 PROCEDURE — 99232 SBSQ HOSP IP/OBS MODERATE 35: CPT

## 2020-02-07 RX ORDER — THIAMINE MONONITRATE (VIT B1) 100 MG
1 TABLET ORAL
Qty: 90 | Refills: 0
Start: 2020-02-07

## 2020-02-07 RX ORDER — HALOPERIDOL DECANOATE 100 MG/ML
1 INJECTION INTRAMUSCULAR
Qty: 30 | Refills: 0
Start: 2020-02-07 | End: 2020-03-07

## 2020-02-07 RX ORDER — DARUNAVIR 75 MG/1
800 TABLET, FILM COATED ORAL
Qty: 0 | Refills: 0 | DISCHARGE

## 2020-02-07 RX ORDER — METFORMIN HYDROCHLORIDE 850 MG/1
1 TABLET ORAL
Qty: 60 | Refills: 0
Start: 2020-02-07 | End: 2020-03-07

## 2020-02-07 RX ORDER — INFLUENZA VIRUS VACCINE 15; 15; 15; 15 UG/.5ML; UG/.5ML; UG/.5ML; UG/.5ML
0.5 SUSPENSION INTRAMUSCULAR ONCE
Refills: 0 | Status: COMPLETED | OUTPATIENT
Start: 2020-02-07 | End: 2020-02-27

## 2020-02-07 RX ORDER — DARUNAVIR 75 MG/1
800 TABLET, FILM COATED ORAL DAILY
Refills: 0 | Status: DISCONTINUED | OUTPATIENT
Start: 2020-02-07 | End: 2020-02-27

## 2020-02-07 RX ORDER — PROPRANOLOL HCL 160 MG
1 CAPSULE, EXTENDED RELEASE 24HR ORAL
Qty: 60 | Refills: 0
Start: 2020-02-07

## 2020-02-07 RX ORDER — RITONAVIR 100 MG/1
1 TABLET, FILM COATED ORAL
Qty: 90 | Refills: 0
Start: 2020-02-07 | End: 2020-05-06

## 2020-02-07 RX ORDER — DOLUTEGRAVIR SODIUM 25 MG/1
1 TABLET, FILM COATED ORAL
Qty: 0 | Refills: 0 | DISCHARGE

## 2020-02-07 RX ORDER — ATORVASTATIN CALCIUM 80 MG/1
10 TABLET, FILM COATED ORAL AT BEDTIME
Refills: 0 | Status: DISCONTINUED | OUTPATIENT
Start: 2020-02-07 | End: 2020-02-27

## 2020-02-07 RX ORDER — DOLUTEGRAVIR SODIUM 25 MG/1
50 TABLET, FILM COATED ORAL
Refills: 0 | Status: DISCONTINUED | OUTPATIENT
Start: 2020-02-07 | End: 2020-02-27

## 2020-02-07 RX ORDER — DARUNAVIR 75 MG/1
1 TABLET, FILM COATED ORAL
Qty: 90 | Refills: 0
Start: 2020-02-07 | End: 2020-05-06

## 2020-02-07 RX ORDER — RITONAVIR 100 MG/1
100 TABLET, FILM COATED ORAL EVERY 24 HOURS
Refills: 0 | Status: DISCONTINUED | OUTPATIENT
Start: 2020-02-07 | End: 2020-02-27

## 2020-02-07 RX ORDER — METFORMIN HYDROCHLORIDE 850 MG/1
500 TABLET ORAL
Refills: 0 | Status: DISCONTINUED | OUTPATIENT
Start: 2020-02-07 | End: 2020-02-11

## 2020-02-07 RX ORDER — HYDROXYZINE HCL 10 MG
50 TABLET ORAL EVERY 6 HOURS
Refills: 0 | Status: DISCONTINUED | OUTPATIENT
Start: 2020-02-07 | End: 2020-02-27

## 2020-02-07 RX ORDER — BENZTROPINE MESYLATE 1 MG
0.5 TABLET ORAL
Refills: 0 | Status: DISCONTINUED | OUTPATIENT
Start: 2020-02-07 | End: 2020-02-27

## 2020-02-07 RX ORDER — HALOPERIDOL DECANOATE 100 MG/ML
0 INJECTION INTRAMUSCULAR
Qty: 0 | Refills: 0 | DISCHARGE

## 2020-02-07 RX ORDER — BENZTROPINE MESYLATE 1 MG
0.5 TABLET ORAL
Qty: 30 | Refills: 0
Start: 2020-02-07 | End: 2020-03-07

## 2020-02-07 RX ORDER — FOLIC ACID 0.8 MG
1 TABLET ORAL
Qty: 90 | Refills: 0
Start: 2020-02-07

## 2020-02-07 RX ORDER — ATORVASTATIN CALCIUM 80 MG/1
1 TABLET, FILM COATED ORAL
Qty: 0 | Refills: 0 | DISCHARGE

## 2020-02-07 RX ORDER — HALOPERIDOL DECANOATE 100 MG/ML
10 INJECTION INTRAMUSCULAR
Qty: 30 | Refills: 0
Start: 2020-02-07

## 2020-02-07 RX ORDER — THIAMINE MONONITRATE (VIT B1) 100 MG
100 TABLET ORAL DAILY
Refills: 0 | Status: DISCONTINUED | OUTPATIENT
Start: 2020-02-07 | End: 2020-02-27

## 2020-02-07 RX ORDER — BENZTROPINE MESYLATE 1 MG
0.5 TABLET ORAL
Qty: 60 | Refills: 0
Start: 2020-02-07

## 2020-02-07 RX ORDER — METFORMIN HYDROCHLORIDE 850 MG/1
1 TABLET ORAL
Qty: 0 | Refills: 0 | DISCHARGE

## 2020-02-07 RX ORDER — THIAMINE MONONITRATE (VIT B1) 100 MG
1 TABLET ORAL
Qty: 30 | Refills: 0
Start: 2020-02-07 | End: 2020-03-07

## 2020-02-07 RX ORDER — DOLUTEGRAVIR SODIUM 25 MG/1
1 TABLET, FILM COATED ORAL
Qty: 90 | Refills: 0
Start: 2020-02-07

## 2020-02-07 RX ORDER — DARUNAVIR 75 MG/1
800 TABLET, FILM COATED ORAL
Qty: 90 | Refills: 0
Start: 2020-02-07

## 2020-02-07 RX ORDER — ATORVASTATIN CALCIUM 80 MG/1
1 TABLET, FILM COATED ORAL
Qty: 30 | Refills: 0
Start: 2020-02-07 | End: 2020-03-07

## 2020-02-07 RX ORDER — NICOTINE POLACRILEX 2 MG
1 GUM BUCCAL DAILY
Refills: 0 | Status: DISCONTINUED | OUTPATIENT
Start: 2020-02-07 | End: 2020-02-27

## 2020-02-07 RX ORDER — BENZTROPINE MESYLATE 1 MG
0 TABLET ORAL
Qty: 0 | Refills: 0 | DISCHARGE

## 2020-02-07 RX ORDER — PROPRANOLOL HCL 160 MG
1 CAPSULE, EXTENDED RELEASE 24HR ORAL
Qty: 60 | Refills: 0
Start: 2020-02-07 | End: 2020-03-07

## 2020-02-07 RX ORDER — RITONAVIR 100 MG/1
100 TABLET, FILM COATED ORAL
Qty: 90 | Refills: 0
Start: 2020-02-07

## 2020-02-07 RX ORDER — METFORMIN HYDROCHLORIDE 850 MG/1
1 TABLET ORAL
Qty: 120 | Refills: 0
Start: 2020-02-07

## 2020-02-07 RX ORDER — DOLUTEGRAVIR SODIUM 25 MG/1
1 TABLET, FILM COATED ORAL
Qty: 120 | Refills: 0
Start: 2020-02-07

## 2020-02-07 RX ORDER — HALOPERIDOL DECANOATE 100 MG/ML
10 INJECTION INTRAMUSCULAR AT BEDTIME
Refills: 0 | Status: DISCONTINUED | OUTPATIENT
Start: 2020-02-07 | End: 2020-02-27

## 2020-02-07 RX ORDER — FOLIC ACID 0.8 MG
1 TABLET ORAL DAILY
Refills: 0 | Status: DISCONTINUED | OUTPATIENT
Start: 2020-02-07 | End: 2020-02-27

## 2020-02-07 RX ORDER — ATORVASTATIN CALCIUM 80 MG/1
1 TABLET, FILM COATED ORAL
Qty: 90 | Refills: 0
Start: 2020-02-07

## 2020-02-07 RX ORDER — RITONAVIR 100 MG/1
100 TABLET, FILM COATED ORAL
Qty: 0 | Refills: 0 | DISCHARGE

## 2020-02-07 RX ORDER — DOLUTEGRAVIR SODIUM 25 MG/1
1 TABLET, FILM COATED ORAL
Qty: 180 | Refills: 0
Start: 2020-02-07 | End: 2020-05-06

## 2020-02-07 RX ADMIN — Medication 100 MILLIGRAM(S): at 10:37

## 2020-02-07 RX ADMIN — HEPARIN SODIUM 5000 UNIT(S): 5000 INJECTION INTRAVENOUS; SUBCUTANEOUS at 06:13

## 2020-02-07 RX ADMIN — Medication 0.5 MILLIGRAM(S): at 17:22

## 2020-02-07 RX ADMIN — Medication 1 TABLET(S): at 10:36

## 2020-02-07 RX ADMIN — DOLUTEGRAVIR SODIUM 50 MILLIGRAM(S): 25 TABLET, FILM COATED ORAL at 17:22

## 2020-02-07 RX ADMIN — HEPARIN SODIUM 5000 UNIT(S): 5000 INJECTION INTRAVENOUS; SUBCUTANEOUS at 14:04

## 2020-02-07 RX ADMIN — RITONAVIR 100 MILLIGRAM(S): 100 TABLET, FILM COATED ORAL at 23:14

## 2020-02-07 RX ADMIN — Medication 0.5 MILLIGRAM(S): at 06:13

## 2020-02-07 RX ADMIN — Medication 1 MILLIGRAM(S): at 10:37

## 2020-02-07 RX ADMIN — DARUNAVIR 800 MILLIGRAM(S): 75 TABLET, FILM COATED ORAL at 10:37

## 2020-02-07 RX ADMIN — DOLUTEGRAVIR SODIUM 50 MILLIGRAM(S): 25 TABLET, FILM COATED ORAL at 06:13

## 2020-02-07 RX ADMIN — SODIUM CHLORIDE 75 MILLILITER(S): 9 INJECTION INTRAMUSCULAR; INTRAVENOUS; SUBCUTANEOUS at 06:13

## 2020-02-07 RX ADMIN — RITONAVIR 100 MILLIGRAM(S): 100 TABLET, FILM COATED ORAL at 10:37

## 2020-02-07 NOTE — DISCHARGE NOTE PROVIDER - HOSPITAL COURSE
Patient is 63yo F domiciled in private residence with grown grandson, with PMH of HIV, HTN, DM, and PPH of Schizophrenia (as per daughter), multiple IPP admissions (mostly at [facility], and most recently several months ago), history of Crack Cocaine use disorder, Nicotine use disorder, and current use of alcohol and marijuana, who presented to the ED today BIB EMS for altered mental status.         	Upon approach, patient is lying in ED hospital bed, noticeably malodorous. Patient is A+Ox2 and explains that her sister, Kavita brought her to the ED today for "being erratic. I was moving funny like when the music comes on." Patient also recalls "there was blood down there," pointing to her genitals, and with further questioning, she clarifies noticing blood in her urine today. Patient endorses not taking any of her medications recently, and she also endorses recent alcohol and cannabis use earlier today but is unable to elaborate on any more details. Patient also endorses 1ppd cigarette use. Patient appears confused and becomes increasingly less able to engage in a meaningful interview.        	Collateral was obtained over the phone from patient's daughter, Mario Carrasco, who was with the patient at the hospital earlier in the day. She confirms that the patient's sister, Kavita, was not at the hospital despite what the patient had said during the interview. Mario states that she has not been talking to the patient for the last year until 4 weeks ago; however, she remains a relatively reliable source of information at present time. She states that the patient has been off of her psychiatric medications for almost a year now, and "this is what happens when she goes off her psych meds. She bugs out." She recalls her mother spending time inpatient at [facility] several months ago but cannot provide more information regarding that admission. Mario explains that "it all started 2 years ago when she got this boyfriend. She stopped taking her psych meds, and now she only drinks and smokes marijuana." She believes the patient's behavior has been deteriorating over the last year but noticeably has gotten worse this past week - "on Monday she walked out of the house to my brother's in a smock and slippers." Then today, "she was tearing up the house," at which point patient's grandson called 911 to have her go to the ED. Mario also endorses significant weight loss over the last year, stating, "she doesn't eat." She also does not believe her mother has been showering or taking care of herself recently as well.        Workup was done to r/o metabolic causes, lab were normal, no signs of infection, CTH was normal. Pt was restarted on HIV medications, haloperidol for psychosis and benztropine and propranolol for akathisia. Bactrim ds was added for PCP ppx.    Psych followed up and recommend out patient follow up as pt seems at baseline, will d/c with out patient follow up with psych  and ID

## 2020-02-07 NOTE — PROGRESS NOTE ADULT - SUBJECTIVE AND OBJECTIVE BOX
LENA AGUIRRE  64y, Female  Allergy: No Known Allergies    2d    CHIEF COMPLAINT: Altered mental status/inability to take care of herself (07 Feb 2020 08:02)      INTERVAL EVENTS/HPI  - No acute events overnight  - T(F): , Max: 97.9 (02-06-20 @ 14:57)  - Denies any worsening symptoms  - Tolerating medication  - WBC Count: 5.22 (02-07-20 @ 07:10)  WBC Count: 4.93 (02-06-20 @ 08:12)  - Creatinine, Serum: 1.1 (02-07-20 @ 07:10)  Creatinine, Serum: 1.1 (02-06-20 @ 08:12)       ROS  General: Denies rigors, nightsweats  HEENT: Denies headache, rhinorrhea, sore throat, eye pain  CV: Denies CP, palpitations  PULM: Denies wheezing, hemoptysis  GI: Denies hematemesis, hematochezia, melena  : Denies discharge, hematuria  MSK: Denies arthralgias, myalgias  SKIN: Denies rash, lesions  NEURO: Denies paresthesias, weakness  PSYCH: Denies depression, anxiety    VITALS:  T(F): 97.7, Max: 97.9 (02-06-20 @ 14:57)  HR: 101  BP: 165/95  RR: 18Vital Signs Last 24 Hrs  T(C): 36.5 (07 Feb 2020 02:29), Max: 36.6 (06 Feb 2020 14:57)  T(F): 97.7 (07 Feb 2020 02:29), Max: 97.9 (06 Feb 2020 14:57)  HR: 101 (07 Feb 2020 06:07) (90 - 101)  BP: 165/95 (07 Feb 2020 06:07) (145/70 - 165/95)  BP(mean): --  RR: 18 (07 Feb 2020 02:29) (18 - 18)  SpO2: --    PHYSICAL EXAM:  Gen: chronically ill appearing NAD, resting in bed  HEENT: Normocephalic, atraumatic, poor dentition  Neck: supple, no lymphadenopathy  CV: Regular rate & regular rhythm  Lungs: CTAB  Abdomen: Soft, BS present  Ext: Warm, well perfused  Neuro: non focal, awake AA&Ox3, akathesia , writhing moments of the hands. strength equal   Skin: no rash, no erythema  Lines: no phlebitis      FH: Non-contributory  Social Hx: Non-contributory    TESTS & MEASUREMENTS:                        11.5   5.22  )-----------( 204      ( 07 Feb 2020 07:10 )             35.0     02-07    140  |  109  |  12  ----------------------------<  112<H>  4.4   |  19  |  1.1    Ca    9.4      07 Feb 2020 07:10    TPro  6.7  /  Alb  3.3<L>  /  TBili  0.3  /  DBili  x   /  AST  29  /  ALT  20  /  AlkPhos  67  02-07    eGFR if Non African American: 54 mL/min/1.73M2 (02-07-20 @ 07:10)  eGFR if : 62 mL/min/1.73M2 (02-07-20 @ 07:10)    LIVER FUNCTIONS - ( 07 Feb 2020 07:10 )  Alb: 3.3 g/dL / Pro: 6.7 g/dL / ALK PHOS: 67 U/L / ALT: 20 U/L / AST: 29 U/L / GGT: x               Culture - Urine (collected 02-05-20 @ 09:45)  Source: .Urine Clean Catch (Midstream)  Preliminary Report (02-06-20 @ 20:00):    >100,000 CFU/ml Gram Negative Rods        Lactate, Blood: 1.5 mmol/L (02-05-20 @ 08:10)      INFECTIOUS DISEASES TESTING      RADIOLOGY & ADDITIONAL TESTS:  I have personally reviewed the last available Chest xray  CXR  Xray Chest 1 View- PORTABLE-Urgent:   EXAM:  XR CHEST PORTABLE URGENT 1V            PROCEDURE DATE:  02/05/2020            INTERPRETATION:  Clinical History / Reason for exam: Altered mental status    Comparison : Chest radiograph 6/20/2018.    Technique/Positioning: Frontal, portable.    Findings:    Support devices: None.    Cardiac/mediastinum/hilum: Unremarkable.    Lung parenchyma/Pleura: Within normal limits.    Skeleton/soft tissues: Degenerative change    Impression:      No radiographic evidence of acute cardiopulmonary disease.                      KATHY COLEMAN M.D., ATTENDING RADIOLOGIST  This document has been electronically signed. Feb 5 2020  9:52AM             (02-05-20 @ 09:38)      CT      CARDIOLOGY TESTING  12 Lead ECG:   Ventricular Rate 92 BPM    Atrial Rate 92 BPM    P-R Interval 114 ms    QRS Duration 70 ms    Q-T Interval 368 ms    QTC Calculation(Bezet) 455 ms    P Axis 68 degrees    R Axis 26 degrees    T Axis 31 degrees    Diagnosis Line Normal sinus rhythm  Possible Left atrial enlargement  RSR' or QR pattern in V1 suggests right ventricular conduction delay  Abnormal ECG    Confirmed by Parviz Wesley ([id number]) on 2/5/2020 2:14:52 PM (02-05-20 @ 11:17)      MEDICATIONS  atorvastatin 10  benztropine 0.5  chlorhexidine 4% Liquid 1  darunavir 800  dolutegravir 50  folic acid 1  haloperidol     Tablet 5  heparin  Injectable 5000  propranolol 10  ritonavir Tablet 100  sodium chloride 0.9%. 1000  tamsulosin 0.4  thiamine 100  trimethoprim  160 mG/sulfamethoxazole 800 mG 1      WEIGHT  Weight (kg): 68 (02-05-20 @ 06:53)    ANTIBIOTICS:  darunavir 800 milliGRAM(s) Oral daily  dolutegravir 50 milliGRAM(s) Oral two times a day  ritonavir Tablet 100 milliGRAM(s) Oral daily  trimethoprim  160 mG/sulfamethoxazole 800 mG 1 Tablet(s) Oral daily      All available historical records have been reviewed

## 2020-02-07 NOTE — PROGRESS NOTE ADULT - REASON FOR ADMISSION
Altered mental status/inability to take care of herself

## 2020-02-07 NOTE — PHYSICAL THERAPY INITIAL EVALUATION ADULT - GENERAL OBSERVATIONS, REHAB EVAL
14:40-15:05. Pt encountered in recliner in NAD, + chair alarm, + IV L UE locked by Lanette DE LOS SANTOS for PT and kun. Dione CARDNEAS present at b/s. Pt agreeable to PT,

## 2020-02-07 NOTE — PROGRESS NOTE BEHAVIORAL HEALTH - NSBHFUPINTERVALHXFT_PSY_A_CORE
Patient was seen and interviewed today. staff on one to one observation was at bedside. Patient was seen and interviewed today. staff on one to one observation was at bedside.  Patient     Collateral information was obtained from patient's daughter4 Mario Carrasco ( [phone]). She reports that patient has become more confused , parannoid , disorganised and hearing voices for about a year since she stopped taking her psychiatric medication. She reports that patient became romantically involved with someone who introduced her to drugs and alcohol and patient stopped taking care of herself and was not even taking her HIV medication. Patient's daughter states " That woman you see there is not my mother when she takes her psychiatric medication". She reports that she was going to take her to [facility] ER for her to be admitted to the inpatient psychiatric floor for medication adjustment after she is discharged from the medical floor. patient's daughter reports that patient has never been diagnosed with dementia and her current behavior is because she was not taking her medication.   As per ID attending taking care of patient on the medical floor, patient is stable medically, has been re-started on her HIV medication. She however reports that at this time, HIV associated can not be ruled out . ID attending had curb sided with writer the day before because of the concern for akathisia as patient was reportedly very restless. Writer had recommend propanolol 10mg P.O TID and patient's restlessness is said to have subsided. Patient was seen and interviewed today. Staff on one to one observation was at bedside.  On approach , patient was observed to be interacting appropriately with the staff at her bedside . She report that she does not know why she is in the hospital. She asked writer to speak to her  . When asked who her  is , she states " Ask Dr Morocho". Patient was reports good sleep and appetite,. She denies hearing voices or feeling that people are against her or want to hurt her. However staff reports that patient is often seen talking to herself. Patient was asked why she was not taking her medication prior to coming to the hospital, she however evaded that question and stated talking to the staff.   On mental statu examination, patient knew that she is in the hospital and knew the staff beside her but did not know the day , month or year. She could repeat 3 words told to her however could not remember them after 5 minuets, she could not name the months of the year backwards. She was able to remember that she had pancakes and scrambled eggs for breakfast , although she reported that she watches the news , was not able to remember any recent events, she however remembered the events of 9/11. At this point patient reported that she no longer wanted to answer any more questions.   Collateral information was obtained from patient's daughter Mario Carrasco ( [phone]). She reports that patient has become more confused , paranoid , disorganised and hearing voices for about a year since she stopped taking her psychiatric medication. She reports that patient became romantically involved with someone who introduced her to drugs and alcohol and patient stopped taking care of herself and was not even taking her HIV medication. Patient's daughter states " That woman you see there is not my mother when she takes her psychiatric medication". She reports that she was going to take her to [facility] ER for her to be admitted to the inpatient psychiatric floor for medication adjustment after she is discharged from the medical floor. patient's daughter reports that patient has never been diagnosed with dementia and her current behavior is because she was not taking her medication.   As per ID attending taking care of patient on the medical floor, patient is stable medically, has been re-started on her HIV medication. She however reports that at this time, HIV associated can not be ruled out . ID attending had curb sided with writer the day before because of the concern for akathisia as patient was reportedly very restless. Writer had recommend propanolol 10mg P.O TID and patient's restlessness is said to have subsided.

## 2020-02-07 NOTE — DISCHARGE NOTE PROVIDER - CARE PROVIDER_API CALL
Kevin Morocho)  Infectious Disease; Internal Medicine  [address] [place] [address]  Phone: [phone]  Fax: [phone]  Follow Up Time:     Jaret [place])  Psychiatry  [address] [place] [address]  Phone: [phone]  Fax: [phone]  Follow Up Time:

## 2020-02-07 NOTE — PROGRESS NOTE ADULT - ASSESSMENT
ASSESSMENT  64F with AIDS non-adherent to ART, HTN, DM, Bipolar/schizophrenia, prior brain aneurysm s/p coil (seen on CTH) admitted by family for encephalopathy    IMPRESSION/RECOMMENDATIONS  #Encephalopathy, suspect 2/2 underlying untreated Psych disorder, current drug use (marijuana, ETOH). While CD4 is 101 and pt is at risk of opportunistic infections, she is afebrile, exam is nonfocal and CTH does not show any lesions, only chronic changes. She denies HA. 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 Needs outpatient Psych f/u  - Discussed with Psych attending, cogentin 0.5 mg BID, haldol 10mg QHS, added propranolol 10mg BID for akathesia  - Started thiamine/folate given ETOH use  - Previous multiple RPR neg, another pending as new partner    #AIDS CD4 101; 4%,  1/22/20  - Restart Dolutegravir 50mg BID, Darunavir 800mg daily, ritonavir 100mg daily  - Bactrim 1 DS daily for PCP PPX  - Follows with Coral Navarro  - Keyon pending    #Asymptomatic bacteriuria pt denies symptoms. UA no significant pyuria. UCX   >100,000 CFU/ml Gram Negative Rods  - No indication for antimicrobials     #HTN- monitor    #HLD- atorvastatin 10mg    #DM- on metformin at home 500mg BID  - REENA    #Dispo : home, pt's daughter aware of the plan, will  today    Spectra [phone] ASSESSMENT  64F with AIDS non-adherent to ART, HTN, DM, Bipolar/schizophrenia, prior brain aneurysm s/p coil (seen on CTH) admitted by family for encephalopathy    IMPRESSION/RECOMMENDATIONS  #Encephalopathy, suspect 2/2 underlying untreated Psych disorder, current drug use (marijuana, ETOH). While CD4 is 101 and pt is at risk of opportunistic infections, she is afebrile, exam is nonfocal and CTH does not show any lesions, only chronic changes. She denies HA. 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 and would usually present with focal deficits, doubt crypto as no HA or fever  - Pending transfer to inpatient Psych   - Utox pending, BAL <10  - Seen by Psych, did not make any change to meds  - Discussed with Psych attending, cogentin 0.5 mg BID, haldol 10mg QHS, added propranolol 10mg BID for akathesia  - Started thiamine/folate given ETOH use  - Previous multiple RPR neg, another pending as new partner    #AIDS CD4 101; 4%,  1/22/20  - Restart Dolutegravir 50mg BID, Darunavir 800mg daily, ritonavir 100mg daily  - Bactrim 1 DS daily for PCP PPX  - Follows with Coral Ta pending    #Asymptomatic bacteriuria pt denies symptoms. UA no significant pyuria (WBC 2), . UCX   >100,000 CFU/ml Gram Negative Rods  - No indication for antimicrobials     #HTN- monitor    #HLD- atorvastatin 10mg    #DM- on metformin at home 500mg BID  - REENA    #Dispo : Pending transfer to inpatient Psych . Daughter is aware    Spectra [phone]

## 2020-02-07 NOTE — PHYSICAL THERAPY INITIAL EVALUATION ADULT - PERTINENT HX OF CURRENT PROBLEM, REHAB EVAL
Patient is a 64y old  Female who presents with a chief complaint of Altered mental status/inability to take care of herself

## 2020-02-07 NOTE — PROGRESS NOTE BEHAVIORAL HEALTH - SUMMARY
At this time, patient is considered a danger to herself and needs inpatient psychiatric hospitalization for medication management and symptom stabilization. Her psychotropic medication will be continued as currently Ms Carrasco is a 64 year old woman with a history of Schizophrenia versus Bipolar disorder versus and HIV Infection and Polysubstance Dependence who was admitted to the medical floor for altered mental status. Psychiatry consult was called for medication management of her altered mental status.   Patient appears to have been non complaint with her psychotropic medication for about 1 year. Patient is disorganised in behavior and thought and  has auditory hallucinations. Since patient was admitted to the medical floor, she was re-started on her psychotropic medication and seems to be less disorganised. She also is said to become less akathetic she was started on Propanolol 10mg TID. Given collateral information that patient is less psychotic when she is compliant with her medication and currently can not take care of herself, it seems her current presentation is a decompensation of her psychiatric illness. However  HIV Associated neurocognitive Disorder cannot be completely ruled out.   At this time, patient is considered a danger to herself and needs inpatient psychiatric hospitalization for medication management and symptom stabilization. Her psychotropic medication will be continued as currently prescribed.    Medications that should be continued on the inpatient psychiatry floor:	  atorvastatin 10 mg oral tablet: 1 tab(s) orally once a day (at bedtime)  Cogentin 0.5 mg oral tablet: 0.5 tab(s) orally 2 times a day   dolutegravir 50 mg oral tablet: 1 tab(s) orally 2 times a day   folic acid 1 mg oral tablet: 1 tab(s) orally once a day  Haldol 10 mg oral tablet: 1 tab(s) orally once a day (at bedtime)   metFORMIN 500 mg oral tablet: 1 tab(s) orally 2 times a day  Prezista 800 mg oral tablet: 1 tab(s) orally once a day   propranolol 10 mg oral tablet: 1 tab(s) orally 2 times a day  ritonavir 100 mg oral capsule: 1 cap(s) orally once a day   sulfamethoxazole-trimethoprim 800 mg-160 mg oral tablet: 1 tab(s) orally once a day  thiamine 100 mg oral tablet: 1 tab(s) orally once a day

## 2020-02-07 NOTE — H&P ADULT - ASSESSMENT
Pt's daughter Mario Carrasco ( [phone]), reports that patient has become more confused , paranoid , disorganised and hearing voices for about a year since she stopped taking her psychiatric medication. She reports that patient became romantically involved with someone who introduced her to drugs and alcohol and patient stopped taking care of herself and was not even taking her HIV medication. Patient's daughter states " That woman you see there is not my mother when she takes her psychiatric medication". She reports that she was going to take her to [facility] for her to be admitted to the inpatient psychiatric floor for medication adjustment after she is discharged from the medical floor. patient's daughter reports that patient has never been diagnosed with dementia and her current behavior is because she was not taking her medication.

## 2020-02-07 NOTE — PHYSICAL THERAPY INITIAL EVALUATION ADULT - ADDITIONAL COMMENTS
Pt reports she has no assistive device, reports her grandson assists supporting her when she uses [employer].

## 2020-02-07 NOTE — DISCHARGE NOTE PROVIDER - NSDCCPCAREPLAN_GEN_ALL_CORE_FT
PRINCIPAL DISCHARGE DIAGNOSIS  Diagnosis: Schizophrenia  Assessment and Plan of Treatment: You came in here becaaus eof agitation. We did basic lab work to rule out metabolic causes. Your labs were normal. Please follow up with the psychiatry as out patient.      SECONDARY DISCHARGE DIAGNOSES  Diagnosis: HIV disease  Assessment and Plan of Treatment: Please be compliant with your medications and follow up with your PMD as out patient

## 2020-02-07 NOTE — PHYSICAL THERAPY INITIAL EVALUATION ADULT - CRITERIA FOR SKILLED THERAPEUTIC INTERVENTIONS
therapy frequency/predicted duration of therapy intervention/impairments found/risk reduction/prevention/rehab potential/functional limitations in following categories

## 2020-02-07 NOTE — DISCHARGE NOTE PROVIDER - NSDCMRMEDTOKEN_GEN_ALL_CORE_FT
atorvastatin 10 mg oral tablet: 1 tab(s) orally once a day  Tivicay 50 mg oral tablet: 1 tab(s) orally once a day atorvastatin 10 mg oral tablet: 1 tab(s) orally once a day (at bedtime)  Cogentin 0.5 mg oral tablet: 0.5 tab(s) orally 2 times a day   dolutegravir 50 mg oral tablet: 1 tab(s) orally 2 times a day   folic acid 1 mg oral tablet: 1 tab(s) orally once a day  Haldol 10 mg oral tablet: 1 tab(s) orally once a day (at bedtime)   metFORMIN 500 mg oral tablet: 1 tab(s) orally 2 times a day  Prezista 800 mg oral tablet: 1 tab(s) orally once a day   propranolol 10 mg oral tablet: 1 tab(s) orally 2 times a day  ritonavir 100 mg oral capsule: 1 cap(s) orally once a day   sulfamethoxazole-trimethoprim 800 mg-160 mg oral tablet: 1 tab(s) orally once a day  thiamine 100 mg oral tablet: 1 tab(s) orally once a day

## 2020-02-07 NOTE — PROGRESS NOTE BEHAVIORAL HEALTH - OTHER
cooperative initially but unable engage meaningfully shortly after. makes bizarre movements with her mouth repetitively. unable to assess. waxing and waning in nature

## 2020-02-07 NOTE — PROGRESS NOTE BEHAVIORAL HEALTH - NSBHCONSULTFOLLOWDETAILS_PSY_A_CORE FT
Transfer to the inpatient psychiatric floor upon medical clearance and bed avalability Transfer to the inpatient psychiatric floor upon medical clearance and bed availability

## 2020-02-07 NOTE — CONSULT NOTE ADULT - ASSESSMENT
IMPRESSION: Rehab of gait ab    PRECAUTIONS: [    ] Cardiac  [    ] Respiratory  [    ] Seizures [    ] Contact Isolation  [    ] Droplet Isolation  [    ] Other    Weight Bearing Status:     RECOMMENDATION: PT TO SEE PRIOR TO DC TO ASSESS FOR ASSISTIVE DEVICE secondary to IMPAIRED BALANCE    Out of Bed to Chair     DVT/Decubiti Prophylaxis    REHAB PLAN:     [    x ] Bedside P/T 3-5 times a week   [     ] Bedside O/T  2-3 times a week   [     ] No Rehab Therapy Indicated   [     ]  Speech Therapy   Conditioning/ROM                                 ADL  Bed Mobility                                            Conditioning/ROM  Transfers                                                  Bed Mobility  Sitting /Standing Balance                      Transfers                                        Gait Training                                            Sitting/Standing Balance  Stair Training [   ]Applicable                 Home equipment Eval                                                                     Splinting  [   ] Only      GOALS:   ADL   [  x  ]   Independent         Transfers  [    x] Independent            Ambulation  [ x    ] Independent     [ x    ] With device?                            [    ]  CG                                               [    ]  CG                                                    [     ] CG                            [    ] Min A                                          [    ] Min A                                                [     ] Min  A          DISCHARGE PLAN:   [     ]  Good candidate for Intensive Rehabilitation/Hospital based                                             Will tolerate 3hrs Intensive Rehab Daily                                       [      ]  Short Term Rehab in Skilled Nursing Facility                                       [ x     ]  Home with Outpatient or VN services                                         [      ]  Possible Candidate for Intensive Hospital based Rehab

## 2020-02-07 NOTE — PROGRESS NOTE ADULT - ASSESSMENT
This is a 64 year old F pt with PMHx of HTN, DM, HIV ( off medications for few months) and psychiatric history of bipolar disorder/ schizophrenia and substance abuse present to the ED for Altered mental status. Pt has been non compliant with medications and exhibiting unusual behavior over past months.    # Altered mental status likely secondary to acute psychosis vs delirium due to acute metabolic encephalopathy  - no fever, leukocytosis or signs of sepsis  - CT head was negative for any acute inta cranial pathology, CXR WNL  - UA negative for blood, LKE but positive for nitrite abd bacteruria, UCX + GNR  - serum alcohol (BAL) < 10 WNL, patient has history of alcohol abuse, will monitor for withdrawal, start thiamine and folic acid  - restart Haldol 5mg q24, cogentin 0,5mg q12, and add propranolol 10mg q12 for akathisia   - psych recommends medical workup for delirium due to possible UTI vs HTN urgency vs HIV- pt reports seeing blood in urine as per psych note  - f/u UCX, urine toxicology, RPR, cryptococcal antigen    # HIV  - CD4 101, viral load 340 1/22  - restart HIV meds Prezista 100mg q24, Tivicay 50mg q12, Norvir 100mg q24.   - bactrim ds q24 for PCP ppx  - f/u ID    # HTN  - not on any medications  - monitor for now, if BP stays persistently high, will start lisinopril.    # DM  - Off oral medications  - Monitor the blood sugar, start insulin if above 180    # DLD  - c/w atorvastatin 10mg q24    # Diet: Carb consistent   # DVT ppx: Heparin Subq  # GI prophylaxis: protonix  # Dispo: pending Psych evaluation   # Full code This is a 64 year old F pt with PMHx of HTN, DM, HIV ( off medications for few months) and psychiatric history of bipolar disorder/ schizophrenia and substance abuse present to the ED for Altered mental status. Pt has been non compliant with medications and exhibiting unusual behavior over past months.    # Altered mental status likely secondary to acute psychosis vs delirium due to acute metabolic encephalopathy  - no fever, leukocytosis or signs of sepsis  - CT head was negative for any acute inta cranial pathology, CXR WNL  - UA negative for blood, LKE but positive for nitrite abd bacteruria, UCX + GNR  - serum alcohol (BAL) < 10 WNL, patient has history of alcohol abuse, will monitor for withdrawal, start thiamine and folic acid  - restart Haldol 5mg q24, cogentin 0,5mg q12, and add propranolol 10mg q12 for akathisia   - psych recommends medical workup for delirium due to possible UTI vs HTN urgency vs HIV- pt reports seeing blood in urine as per psych note  - f/u UCX, urine toxicology, RPR, cryptococcal antigen    # HIV  - CD4 101, viral load 340 1/22  - restart HIV meds Prezista 100mg q24, Tivicay 50mg q12, Norvir 100mg q24.   - bactrim ds q24 for PCP ppx  - f/u ID    # HTN  - not on any medications  - monitor for now, if BP stays persistently high, will start lisinopril.    # DM  - Off oral medications  - Monitor the blood sugar, start insulin if above 180    # DLD  - c/w atorvastatin 10mg q24    # Diet: Carb consistent   # DVT ppx: Heparin Subq  # GI prophylaxis: protonix  # Dispo: will d/c today as per psych with out patient follow up  # Full code

## 2020-02-07 NOTE — PROGRESS NOTE ADULT - SUBJECTIVE AND OBJECTIVE BOX
SUBJECTIVE:    Patient is a 64y old Female who presents with a chief complaint of Altered mental status/inability to take care of herself (2020 10:28)    Currently admitted to medicine with the primary diagnosis of Inadequate social support     Today is hospital day 2d. This morning she is resting comfortably in bed and reports no new issues or overnight events.     PAST MEDICAL & SURGICAL HISTORY  High cholesterol  Diabetes  HTN (hypertension)  HIV (human immunodeficiency virus infection)    SOCIAL HISTORY:  Negative for smoking/alcohol/drug use.     ALLERGIES:  No Known Allergies    MEDICATIONS:  STANDING MEDICATIONS  atorvastatin 10 milliGRAM(s) Oral at bedtime  benztropine 0.5 milliGRAM(s) Oral every 12 hours  chlorhexidine 4% Liquid 1 Application(s) Topical <User Schedule>  darunavir 800 milliGRAM(s) Oral daily  dolutegravir 50 milliGRAM(s) Oral two times a day  folic acid 1 milliGRAM(s) Oral daily  haloperidol     Tablet 5 milliGRAM(s) Oral at bedtime  heparin  Injectable 5000 Unit(s) SubCutaneous every 8 hours  propranolol 10 milliGRAM(s) Oral two times a day  ritonavir Tablet 100 milliGRAM(s) Oral daily  sodium chloride 0.9%. 1000 milliLiter(s) IV Continuous <Continuous>  tamsulosin 0.4 milliGRAM(s) Oral at bedtime  thiamine 100 milliGRAM(s) Oral daily  trimethoprim  160 mG/sulfamethoxazole 800 mG 1 Tablet(s) Oral daily    PRN MEDICATIONS  oxycodone    5 mG/acetaminophen 325 mG 1 Tablet(s) Oral every 4 hours PRN    VITALS:   T(F): 97.7  HR: 101  BP: 165/95  RR: 18  SpO2: --    LABS:                        12.7   4.93  )-----------( 225      ( 2020 08:12 )             38.5     02-06    141  |  109  |  12  ----------------------------<  112<H>  4.5   |  16<L>  |  1.1    Ca    9.5      2020 08:12  Mg     1.8     02-05    TPro  7.4  /  Alb  3.6  /  TBili  0.4  /  DBili  x   /  AST  38  /  ALT  24  /  AlkPhos  77  02-06      Urinalysis Basic - ( 2020 09:45 )    Color: Yellow / Appearance: Slightly Turbid / S.017 / pH: x  Gluc: x / Ketone: Trace  / Bili: Negative / Urobili: <2 mg/dL   Blood: x / Protein: 30 mg/dL / Nitrite: Positive   Leuk Esterase: Negative / RBC: 2 /HPF / WBC 2 /HPF   Sq Epi: x / Non Sq Epi: 1 /HPF / Bacteria: Many            Culture - Urine (collected 2020 09:45)  Source: .Urine Clean Catch (Midstream)  Preliminary Report (2020 20:00):    >100,000 CFU/ml Gram Negative Rods      CARDIAC MARKERS ( 2020 08:10 )  x     / <0.01 ng/mL / x     / x     / x          RADIOLOGY:  < from: CT Head No Cont (20 @ 04:33) >  Impression:     No CT evidence of acute intracranial patholog    < end of copied text >    PHYSICAL EXAM:  Gen: chronically ill appearing NAD, resting in bed  HEENT: Normocephalic, atraumatic, poor dentition  Neck: supple, no lymphadenopathy  CV: Regular rate & regular rhythm  Lungs: CTAB  Abdomen: Soft, BS present  Ext: Warm, well perfused

## 2020-02-07 NOTE — H&P ADULT - HISTORY OF PRESENT ILLNESS
Per Psych notes: Patient's daughter Mario Carrasco ( [phone]), reports that patient has become more confused , paranoid , disorganised and hearing voices for about a year since she stopped taking her psychiatric medication. She reports that patient became romantically involved with someone who introduced her to drugs and alcohol and patient stopped taking care of herself and was not even taking her HIV medication. Patient's daughter states " That woman you see there is not my mother when she takes her psychiatric medication". She reports that she was going to take her to [facility] ER for her to be admitted to the inpatient psychiatric floor for medication adjustment after she is discharged from the medical floor. patient's daughter reports that patient has never been diagnosed with dementia and her current behavior is because she was not taking her medication. Per Psych notes: Patient's daughter Mario Carrasco ( [phone]), reports that patient has become more confused , paranoid , disorganised and hearing voices for about a year since she stopped taking her psychiatric medication. She reports that patient became romantically involved with someone who introduced her to drugs and alcohol and patient stopped taking care of herself and was not even taking her HIV medication. Patient's daughter states " That woman you see there is not my mother when she takes her psychiatric medication". She reports that she was going to take her to [facility] ER for her to be admitted to the inpatient psychiatric floor for medication adjustment after she is discharged from the medical floor. patient's daughter reports that patient has never been diagnosed with dementia and her current behavior is because she was not taking her medication.    Pt was initially admitted to medicine for mental status changes

## 2020-02-07 NOTE — CONSULT NOTE ADULT - SUBJECTIVE AND OBJECTIVE BOX
Patient is a 64y old  Female who presents with a chief complaint of Altered mental status/inability to take care of herself (07 Feb 2020 10:37)    HPI:  This is a 64 year old F patient with past medical history of HTN, DM and HIV ( was on HAART ) and Bipolar/schizophrenic?  who was brought to the ED by EMS for altered mental status. The patient is a poor historian and unable to participate in the history, upon talking to the family they stated that the patient is not at her baseline and she can no longer take care of herself. She has been off her psychiatric and HIV medications for months as per the daughter at bedside.     They denied she having any recent illness, cough, diarrhea, or any complaints during urination or any other symptoms (05 Feb 2020 13:47)      PAST MEDICAL & SURGICAL HISTORY:  High cholesterol  Diabetes  HTN (hypertension)  HIV (human immunodeficiency virus infection)      Hospital Course: ltered mental status likely secondary to acute psychosis vs delirium due to acute metabolic encephalopathy  - no fever, leukocytosis or signs of sepsis  - CT head was negative for any acute inta cranial pathology, CXR WNL  - UA negative for blood, LKE but positive for nitrite abd bacteruria, UCX + GNR  - serum alcohol (BAL) < 10 WNL, patient has history of alcohol abuse, will monitor for withdrawal, start thiamine and folic acid  - restart Haldol 5mg q24, cogentin 0,5mg q12, and add propranolol 10mg q12 for akathisia   - psych recommends medical workup for delirium due to possible UTI vs HTN urgency vs HIV- pt reports seeing blood in urine as per psych note  - f/u UCX, urine toxicology, RPR, cryptococcal antigen    # HIV  - CD4 101, viral load 340 1/22  - restart HIV meds Prezista 100mg q24, Tivicay 50mg q12, Norvir 100mg q24.   - bactrim ds q24 for PCP ppx  - f/u ID    # HTN  - not on any medications  - monitor for now, if BP stays persistently high, will start lisinopril.    # DM  - Off oral medications  - Monitor the blood sugar, start insulin if above 180    # DLD  - c/w atorvastatin 10mg q24    # Diet: Carb consistent   # DVT ppx: Heparin Subq  # GI prophylaxis: protonix    TODAY'S SUBJECTIVE & REVIEW OF SYMPTOMS:     Constitutional WNL   Cardio WNL   Resp WNL   GI WNL  Heme WNL  Endo WNL  Skin WNL  MSK WNL  Neuro WNL  Cognitive WNL  Psych WNL      MEDICATIONS  (STANDING):  atorvastatin 10 milliGRAM(s) Oral at bedtime  benztropine 0.5 milliGRAM(s) Oral every 12 hours  chlorhexidine 4% Liquid 1 Application(s) Topical <User Schedule>  darunavir 800 milliGRAM(s) Oral daily  dolutegravir 50 milliGRAM(s) Oral two times a day  folic acid 1 milliGRAM(s) Oral daily  haloperidol     Tablet 5 milliGRAM(s) Oral at bedtime  heparin  Injectable 5000 Unit(s) SubCutaneous every 8 hours  propranolol 10 milliGRAM(s) Oral two times a day  ritonavir Tablet 100 milliGRAM(s) Oral daily  sodium chloride 0.9%. 1000 milliLiter(s) (75 mL/Hr) IV Continuous <Continuous>  tamsulosin 0.4 milliGRAM(s) Oral at bedtime  thiamine 100 milliGRAM(s) Oral daily  trimethoprim  160 mG/sulfamethoxazole 800 mG 1 Tablet(s) Oral daily    MEDICATIONS  (PRN):  oxycodone    5 mG/acetaminophen 325 mG 1 Tablet(s) Oral every 4 hours PRN Moderate Pain (4 - 6)      FAMILY HISTORY:      Allergies    No Known Allergies    Intolerances        SOCIAL HISTORY:    [    ] Etoh  [    ] Smoking  [    ] Substance abuse     Home Environment:  [ x   ] Home Alone  [    ] Lives with Family  [    ] Home Health Aid    Dwelling:  [ x   ] Apartment  [    ] Private House  [    ] Adult Home  [    ] Skilled Nursing Facility      [    ] Short Term  [    ] Long Term  [    ] Stairs                           [ x   ] Elevator     FUNCTIONAL STATUS PTA: (Check all that apply)  Ambulation: [  x   ]Independent    [    ] Dependent     [    ] Non-Ambulatory  Assistive Device: [    ] SA Cane  [    ]  Q Cane  [    ] Walker  [    ]  Wheelchair  ADL : [ x   ] Independent  [    ]  Dependent       Vital Signs Last 24 Hrs  T(C): 36.6 (07 Feb 2020 13:33), Max: 36.6 (06 Feb 2020 14:57)  T(F): 97.8 (07 Feb 2020 13:33), Max: 97.9 (06 Feb 2020 14:57)  HR: 79 (07 Feb 2020 13:33) (79 - 101)  BP: 142/86 (07 Feb 2020 13:33) (142/86 - 165/95)  BP(mean): --  RR: 16 (07 Feb 2020 13:33) (16 - 18)  SpO2: --      PHYSICAL EXAM: Alert & Oriented X2 confused  GENERAL: NAD, well-groomed, well-developed  HEAD:  Atraumatic, Normocephalic  EYES: EOMI, PERRLA, conjunctiva and sclera clear  NECK: Supple  CHEST/LUNG: Clear bilaterally  HEART: Regular rate and rhythm  ABDOMEN: Soft, Nontender, Nondistended; Bowel sounds present  EXTREMITIES:  no calf tenderness,no edema BLES    NERVOUS SYSTEM:  Cranial Nerves 2-12 intact [  x  ] Abnormal  [    ]  ROM: WFL all extremities [    x]  Abnormal [     ]  Motor Strength: WFL all extremities  [    x]  Abnormal [    ]  Sensation: intact to light touch [   x ] Abnormal [    ]    FUNCTIONAL STATUS:  Bed Mobility: [   ]  Independent [    ]  Supervision [ x   ]  Needs Assistance [  ]  N/A  Transfers: [    ]  Independent [    ]  Supervision [ x   ]  Needs Assistance [    ]  N/A    Ambulation:  [    ]  Independent [    ]  Supervision [ x   ]  Needs Assistance [    ]  N/A   ADL:  [    ]   Independent [ x   ] Requires Assistance [    ] N/A   Poor balance    LABS:                        11.5   5.22  )-----------( 204      ( 07 Feb 2020 07:10 )             35.0     02-07    140  |  109  |  12  ----------------------------<  112<H>  4.4   |  19  |  1.1    Ca    9.4      07 Feb 2020 07:10    TPro  6.7  /  Alb  3.3<L>  /  TBili  0.3  /  DBili  x   /  AST  29  /  ALT  20  /  AlkPhos  67  02-07          RADIOLOGY & ADDITIONAL STUDIES:

## 2020-02-07 NOTE — PATIENT PROFILE BEHAVIORAL HEALTH - TEACHING/LEARNING LEARNING PREFERENCES
individual instruction/pictorial/video/verbal instruction/computer/internet/group instruction/skill demonstration

## 2020-02-08 LAB — GLUCOSE BLDC GLUCOMTR-MCNC: 159 MG/DL — HIGH (ref 70–99)

## 2020-02-08 PROCEDURE — 99232 SBSQ HOSP IP/OBS MODERATE 35: CPT

## 2020-02-08 RX ORDER — AMLODIPINE BESYLATE 2.5 MG/1
5 TABLET ORAL DAILY
Refills: 0 | Status: DISCONTINUED | OUTPATIENT
Start: 2020-02-08 | End: 2020-02-11

## 2020-02-08 RX ADMIN — DOLUTEGRAVIR SODIUM 50 MILLIGRAM(S): 25 TABLET, FILM COATED ORAL at 08:07

## 2020-02-08 RX ADMIN — HALOPERIDOL DECANOATE 10 MILLIGRAM(S): 100 INJECTION INTRAMUSCULAR at 20:35

## 2020-02-08 RX ADMIN — Medication 1 PATCH: at 08:07

## 2020-02-08 RX ADMIN — DARUNAVIR 800 MILLIGRAM(S): 75 TABLET, FILM COATED ORAL at 08:06

## 2020-02-08 RX ADMIN — ATORVASTATIN CALCIUM 10 MILLIGRAM(S): 80 TABLET, FILM COATED ORAL at 20:32

## 2020-02-08 RX ADMIN — Medication 100 MILLIGRAM(S): at 08:07

## 2020-02-08 RX ADMIN — RITONAVIR 100 MILLIGRAM(S): 100 TABLET, FILM COATED ORAL at 23:51

## 2020-02-08 RX ADMIN — METFORMIN HYDROCHLORIDE 500 MILLIGRAM(S): 850 TABLET ORAL at 20:32

## 2020-02-08 RX ADMIN — METFORMIN HYDROCHLORIDE 500 MILLIGRAM(S): 850 TABLET ORAL at 08:07

## 2020-02-08 RX ADMIN — Medication 1 TABLET(S): at 08:06

## 2020-02-08 RX ADMIN — Medication 0.5 MILLIGRAM(S): at 20:33

## 2020-02-08 RX ADMIN — Medication 1 MILLIGRAM(S): at 08:07

## 2020-02-08 RX ADMIN — AMLODIPINE BESYLATE 5 MILLIGRAM(S): 2.5 TABLET ORAL at 18:28

## 2020-02-08 RX ADMIN — DOLUTEGRAVIR SODIUM 50 MILLIGRAM(S): 25 TABLET, FILM COATED ORAL at 20:35

## 2020-02-08 RX ADMIN — Medication 0.5 MILLIGRAM(S): at 08:06

## 2020-02-09 LAB
GLUCOSE BLDC GLUCOMTR-MCNC: 101 MG/DL — HIGH (ref 70–99)
GLUCOSE BLDC GLUCOMTR-MCNC: 153 MG/DL — HIGH (ref 70–99)

## 2020-02-09 PROCEDURE — 99232 SBSQ HOSP IP/OBS MODERATE 35: CPT

## 2020-02-09 RX ADMIN — Medication 1 PATCH: at 19:28

## 2020-02-09 RX ADMIN — Medication 1 PATCH: at 09:08

## 2020-02-09 RX ADMIN — Medication 1 PATCH: at 09:27

## 2020-02-09 RX ADMIN — METFORMIN HYDROCHLORIDE 500 MILLIGRAM(S): 850 TABLET ORAL at 09:26

## 2020-02-09 RX ADMIN — Medication 0.5 MILLIGRAM(S): at 20:19

## 2020-02-09 RX ADMIN — DOLUTEGRAVIR SODIUM 50 MILLIGRAM(S): 25 TABLET, FILM COATED ORAL at 20:19

## 2020-02-09 RX ADMIN — RITONAVIR 100 MILLIGRAM(S): 100 TABLET, FILM COATED ORAL at 20:20

## 2020-02-09 RX ADMIN — HALOPERIDOL DECANOATE 10 MILLIGRAM(S): 100 INJECTION INTRAMUSCULAR at 20:20

## 2020-02-09 RX ADMIN — Medication 1 MILLIGRAM(S): at 09:27

## 2020-02-09 RX ADMIN — Medication 0.5 MILLIGRAM(S): at 09:27

## 2020-02-09 RX ADMIN — ATORVASTATIN CALCIUM 10 MILLIGRAM(S): 80 TABLET, FILM COATED ORAL at 20:19

## 2020-02-09 RX ADMIN — DARUNAVIR 800 MILLIGRAM(S): 75 TABLET, FILM COATED ORAL at 09:27

## 2020-02-09 RX ADMIN — METFORMIN HYDROCHLORIDE 500 MILLIGRAM(S): 850 TABLET ORAL at 20:19

## 2020-02-09 RX ADMIN — DOLUTEGRAVIR SODIUM 50 MILLIGRAM(S): 25 TABLET, FILM COATED ORAL at 09:27

## 2020-02-09 RX ADMIN — Medication 1 TABLET(S): at 09:29

## 2020-02-09 RX ADMIN — AMLODIPINE BESYLATE 5 MILLIGRAM(S): 2.5 TABLET ORAL at 09:26

## 2020-02-09 RX ADMIN — Medication 100 MILLIGRAM(S): at 09:29

## 2020-02-10 DIAGNOSIS — F20.1 DISORGANIZED SCHIZOPHRENIA: ICD-10-CM

## 2020-02-10 DIAGNOSIS — E11.9 TYPE 2 DIABETES MELLITUS WITHOUT COMPLICATIONS: ICD-10-CM

## 2020-02-10 DIAGNOSIS — F14.10 COCAINE ABUSE, UNCOMPLICATED: ICD-10-CM

## 2020-02-10 DIAGNOSIS — F29 UNSPECIFIED PSYCHOSIS NOT DUE TO A SUBSTANCE OR KNOWN PHYSIOLOGICAL CONDITION: ICD-10-CM

## 2020-02-10 DIAGNOSIS — B20 HUMAN IMMUNODEFICIENCY VIRUS [HIV] DISEASE: ICD-10-CM

## 2020-02-10 LAB
GLUCOSE BLDC GLUCOMTR-MCNC: 164 MG/DL — HIGH (ref 70–99)
T PALLIDUM AB TITR SER: NEGATIVE — SIGNIFICANT CHANGE UP

## 2020-02-10 PROCEDURE — 99232 SBSQ HOSP IP/OBS MODERATE 35: CPT

## 2020-02-10 RX ORDER — ONDANSETRON 8 MG/1
4 TABLET, FILM COATED ORAL ONCE
Refills: 0 | Status: DISCONTINUED | OUTPATIENT
Start: 2020-02-10 | End: 2020-02-27

## 2020-02-10 RX ORDER — ONDANSETRON 8 MG/1
4 TABLET, FILM COATED ORAL EVERY 8 HOURS
Refills: 0 | Status: COMPLETED | OUTPATIENT
Start: 2020-02-10 | End: 2020-02-12

## 2020-02-10 RX ADMIN — DOLUTEGRAVIR SODIUM 50 MILLIGRAM(S): 25 TABLET, FILM COATED ORAL at 08:13

## 2020-02-10 RX ADMIN — Medication 1 TABLET(S): at 08:12

## 2020-02-10 RX ADMIN — DARUNAVIR 800 MILLIGRAM(S): 75 TABLET, FILM COATED ORAL at 08:12

## 2020-02-10 RX ADMIN — Medication 0.5 MILLIGRAM(S): at 20:05

## 2020-02-10 RX ADMIN — Medication 1 PATCH: at 12:14

## 2020-02-10 RX ADMIN — HALOPERIDOL DECANOATE 10 MILLIGRAM(S): 100 INJECTION INTRAMUSCULAR at 20:05

## 2020-02-10 RX ADMIN — ONDANSETRON 4 MILLIGRAM(S): 8 TABLET, FILM COATED ORAL at 22:03

## 2020-02-10 RX ADMIN — AMLODIPINE BESYLATE 5 MILLIGRAM(S): 2.5 TABLET ORAL at 08:12

## 2020-02-10 RX ADMIN — DOLUTEGRAVIR SODIUM 50 MILLIGRAM(S): 25 TABLET, FILM COATED ORAL at 20:06

## 2020-02-10 RX ADMIN — Medication 1 MILLIGRAM(S): at 08:13

## 2020-02-10 RX ADMIN — Medication 100 MILLIGRAM(S): at 08:13

## 2020-02-10 RX ADMIN — RITONAVIR 100 MILLIGRAM(S): 100 TABLET, FILM COATED ORAL at 22:48

## 2020-02-10 RX ADMIN — Medication 1 PATCH: at 12:10

## 2020-02-10 RX ADMIN — METFORMIN HYDROCHLORIDE 500 MILLIGRAM(S): 850 TABLET ORAL at 20:05

## 2020-02-10 RX ADMIN — METFORMIN HYDROCHLORIDE 500 MILLIGRAM(S): 850 TABLET ORAL at 08:13

## 2020-02-10 RX ADMIN — ATORVASTATIN CALCIUM 10 MILLIGRAM(S): 80 TABLET, FILM COATED ORAL at 20:05

## 2020-02-10 RX ADMIN — Medication 1 PATCH: at 08:14

## 2020-02-10 RX ADMIN — Medication 0.5 MILLIGRAM(S): at 08:14

## 2020-02-10 NOTE — PROGRESS NOTE BEHAVIORAL HEALTH - NSBHADDHXPSYCHFT_PSY_A_CORE
Per collateral from daughter, patient has had longstanding psychiatric illness (as long as adult daughter can remember), which had psychotic symptoms such as paranoia, auditory hallucinations as well as depressed mood. Endorses numerous psychiatric hospitalizations, last August 2019 at [facility]. Endorses that symptoms significantly improve on psychotropics however noncompliance leads to relapse.

## 2020-02-10 NOTE — PROGRESS NOTE BEHAVIORAL HEALTH - NSBHADDHXSUBSTFT_PSY_A_CORE
Prior history of cocaine use disorder, alcohol use disorder. Daughter endorses relapse on alcohol and cannabis, uncertain about other substances over last 1.5 years. Patient endorses crack cocaine use prior to admission. Details are unclear due to patients cognitive status.

## 2020-02-10 NOTE — CHART NOTE - NSCHARTNOTEFT_GEN_A_CORE
Pt. is a 64 year old,  female with a diagnosis of schizophrenia.  Pt. was admitted due to symptoms of psychosis which includes disorganized behavior.  Pt. appears to have a possible cognitive impairment/dementia, possibly due to her HIV diagnosis.  She is a poor historian due to these symptoms.  Collateral information was obtained from pt.'s daughter, Mario [phone].  Pt. has reportedly struggled with mental health issues for many years and has had numerous IPP admissions.  Pt.'s last hospitalization was in August 2019 at [facility].  She reportedly has a history of non-compliance with her psychotropic medications but does well when taking her medications as prescribed.  Pt. reports a history of crack-cocaine, cocaine, and alcohol and marijuana use.  She admitted to using crack-cocaine just prior to her admission.  Pt.'s daughter believes she has also been using alcohol and marijuana.      In the community, pt. resides with her grandson.  She is not  and has 1 adult daughter.  Pt. is not employed and is currently disabled.  Her level of education is unknown at this time.    Sexual History-  Pt. identifies as heterosexual    History of Suicide Attempts and Self-Injurious Behaviors-  No known history    Family History-  Pt. is not .  She has an adult daughter and grandson.    Traumatic Losses-  None reported    Substance Use Assessment-  Pt. has a history of crack-cocaine, cocaine, alcohol and marijuana use.  She reports using crack-cocaine just prior to admission.    Employment-  Pt. is disabled    Community Supports-  Daughter, grandson    Leisure Activity Assessment-  Unable to obtain    Life Goals-  Unable to obtain    Pt. is not psychiatrically stable to for discharge at this time.

## 2020-02-10 NOTE — CONSULT NOTE ADULT - SUBJECTIVE AND OBJECTIVE BOX
LENA AGUIRRE  64y  Female      Patient is a 64y old  Female who presents with a chief complaint of psychosis (07 Feb 2020 21:37)    HPI:  Per Psych notes: Patient's daughter Mario Aguirre ( [phone]), reports that patient has become more confused , paranoid , disorganised and hearing voices for about a year since she stopped taking her psychiatric medication. She reports that patient became romantically involved with someone who introduced her to drugs and alcohol and patient stopped taking care of herself and was not even taking her HIV medication. Patient's daughter states " That woman you see there is not my mother when she takes her psychiatric medication". She reports that she was going to take her to [facility] ER for her to be admitted to the inpatient psychiatric floor for medication adjustment after she is discharged from the medical floor. patient's daughter reports that patient has never been diagnosed with dementia and her current behavior is because she was not taking her medication.    Pt was initially admitted to medicine for mental status changes (07 Feb 2020 21:37)    INTERVAL HPI/OVERNIGHT EVENTS:  HEALTH ISSUES - PROBLEM Dx:  HIV (human immunodeficiency virus infection): HIV (human immunodeficiency virus infection)  HTN (hypertension): HTN (hypertension)  Diabetes: Diabetes  Psychosis: Psychosis        PAST MEDICAL & SURGICAL HISTORY:  High cholesterol  Diabetes  HTN (hypertension)  HIV (human immunodeficiency virus infection)    FAMILY HISTORY:    amLODIPine   Tablet 5 milliGRAM(s) Oral daily  atorvastatin 10 milliGRAM(s) Oral at bedtime  benztropine 0.5 milliGRAM(s) Oral two times a day  darunavir 800 milliGRAM(s) Oral daily  dolutegravir 50 milliGRAM(s) Oral two times a day  folic acid 1 milliGRAM(s) Oral daily  haloperidol     Tablet 10 milliGRAM(s) Oral at bedtime  hydrOXYzine hydrochloride 50 milliGRAM(s) Oral every 6 hours PRN  influenza   Vaccine 0.5 milliLiter(s) IntraMuscular once  metFORMIN 500 milliGRAM(s) Oral two times a day  nicotine -  14 mG/24Hr(s) Patch 1 patch Transdermal daily  propranolol 10 milliGRAM(s) Oral two times a day  ritonavir Tablet 100 milliGRAM(s) Oral every 24 hours  thiamine 100 milliGRAM(s) Oral daily  trimethoprim  160 mG/sulfamethoxazole 800 mG 1 Tablet(s) Oral daily      REVIEW OF SYSTEMS:  CONSTITUTIONAL: No fever, weight loss, or fatigue  EYES: No eye pain, visual disturbances, or discharge  ENMT:  No difficulty hearing, tinnitus, vertigo; No sinus or throat pain  NECK: No pain or stiffness  BREASTS: No pain, masses, or nipple discharge  RESPIRATORY: No cough, wheezing, chills or hemoptysis; No shortness of breath  CARDIOVASCULAR: No chest pain, palpitations, dizziness, or leg swelling  GASTROINTESTINAL: No abdominal or epigastric pain. No nausea, vomiting, or hematemesis; No diarrhea or constipation. No melena or hematochezia.  GENITOURINARY: No dysuria, frequency, hematuria, or incontinence  NEUROLOGICAL: No headaches, memory loss, loss of strength, numbness, or tremors  SKIN: No itching, burning, rashes, or lesions   LYMPH NODES: No enlarged glands  ENDOCRINE: No heat or cold intolerance; No hair loss  MUSCULOSKELETAL: No joint pain or swelling; No muscle, back, or extremity pain  PSYCHIATRIC: as per hpi and previous psych history  HEME/LYMPH: No easy bruising, or bleeding gums  ALLERY AND IMMUNOLOGIC: No hives or eczema    T(C): 36.1 (02-10-20 @ 08:25), Max: 37.1 (02-09-20 @ 08:59)  HR: 99 (02-10-20 @ 08:25) (81 - 116)  BP: 117/57 (02-10-20 @ 08:25) (117/57 - 123/86)  RR: 18 (02-10-20 @ 08:25) (16 - 18)  SpO2: --  Wt(kg): --Vital Signs Last 24 Hrs  T(C): 36.1 (10 Feb 2020 08:25), Max: 37.1 (09 Feb 2020 08:59)  T(F): 97 (10 Feb 2020 08:25), Max: 98.7 (09 Feb 2020 08:59)  HR: 99 (10 Feb 2020 08:25) (81 - 116)  BP: 117/57 (10 Feb 2020 08:25) (117/57 - 123/86)  BP(mean): --  RR: 18 (10 Feb 2020 08:25) (16 - 18)  SpO2: --    PHYSICAL EXAM:  GENERAL: NAD,well-developed  HEAD:  Atraumatic, Normocephalic  EYES: EOMI, PERRLA, conjunctiva and sclera clear  ENMT: No tonsillar erythema, exudates, or enlargement; Moist mucous membranes, Good dentition, No lesions  NECK: Supple, No JVD, Normal thyroid  CHEST/LUNG: Clear bs bilaterally; No rales, rhonchi, wheezing  HEART: Regular rate and rhythm; No murmurs, rubs, or gallops  ABDOMEN: Soft, Nontender, Nondistended; Bowel sounds present  EXTREMITIES:  2+ Peripheral Pulses, No clubbing, cyanosis, or edema  LYMPH: No lymphadenopathy noted  SKIN: No rashes or lesions  Neuro: alert  no focal deficits    Consultant(s) Notes Reviewed:  [x ] YES  [ ] NO  Care Discussed with Consultants/Other Providers [ x] YES  [ ] NO    LABS:              CAPILLARY BLOOD GLUCOSE      POCT Blood Glucose.: 153 mg/dL (09 Feb 2020 16:00)            RADIOLOGY & ADDITIONAL TESTS:    Imaging Personally Reviewed:  [ ] YES  [ ] NO

## 2020-02-11 DIAGNOSIS — F10.20 ALCOHOL DEPENDENCE, UNCOMPLICATED: ICD-10-CM

## 2020-02-11 LAB
ALBUMIN SERPL ELPH-MCNC: 4.1 G/DL — SIGNIFICANT CHANGE UP (ref 3.5–5.2)
ALP SERPL-CCNC: 81 U/L — SIGNIFICANT CHANGE UP (ref 30–115)
ALT FLD-CCNC: 19 U/L — SIGNIFICANT CHANGE UP (ref 0–41)
ANION GAP SERPL CALC-SCNC: 17 MMOL/L — HIGH (ref 7–14)
AST SERPL-CCNC: 26 U/L — SIGNIFICANT CHANGE UP (ref 0–41)
BASOPHILS # BLD AUTO: 0.02 K/UL — SIGNIFICANT CHANGE UP (ref 0–0.2)
BASOPHILS NFR BLD AUTO: 0.3 % — SIGNIFICANT CHANGE UP (ref 0–1)
BILIRUB SERPL-MCNC: 0.3 MG/DL — SIGNIFICANT CHANGE UP (ref 0.2–1.2)
BUN SERPL-MCNC: 20 MG/DL — SIGNIFICANT CHANGE UP (ref 10–20)
CALCIUM SERPL-MCNC: 10.2 MG/DL — HIGH (ref 8.5–10.1)
CHLORIDE SERPL-SCNC: 92 MMOL/L — LOW (ref 98–110)
CHOLEST SERPL-MCNC: 167 MG/DL — SIGNIFICANT CHANGE UP (ref 100–200)
CO2 SERPL-SCNC: 23 MMOL/L — SIGNIFICANT CHANGE UP (ref 17–32)
CREAT SERPL-MCNC: 1.5 MG/DL — SIGNIFICANT CHANGE UP (ref 0.7–1.5)
EOSINOPHIL # BLD AUTO: 0.01 K/UL — SIGNIFICANT CHANGE UP (ref 0–0.7)
EOSINOPHIL NFR BLD AUTO: 0.2 % — SIGNIFICANT CHANGE UP (ref 0–8)
ESTIMATED AVERAGE GLUCOSE: 128 MG/DL — HIGH (ref 68–114)
GLUCOSE BLDC GLUCOMTR-MCNC: 125 MG/DL — HIGH (ref 70–99)
GLUCOSE BLDC GLUCOMTR-MCNC: 232 MG/DL — HIGH (ref 70–99)
GLUCOSE SERPL-MCNC: 149 MG/DL — HIGH (ref 70–99)
HBA1C BLD-MCNC: 6.1 % — HIGH (ref 4–5.6)
HCT VFR BLD CALC: 40.1 % — SIGNIFICANT CHANGE UP (ref 37–47)
HDLC SERPL-MCNC: 58 MG/DL — SIGNIFICANT CHANGE UP
HGB BLD-MCNC: 13.3 G/DL — SIGNIFICANT CHANGE UP (ref 12–16)
IMM GRANULOCYTES NFR BLD AUTO: 0.8 % — HIGH (ref 0.1–0.3)
LIPID PNL WITH DIRECT LDL SERPL: 82 MG/DL — SIGNIFICANT CHANGE UP (ref 4–129)
LYMPHOCYTES # BLD AUTO: 2.6 K/UL — SIGNIFICANT CHANGE UP (ref 1.2–3.4)
LYMPHOCYTES # BLD AUTO: 39.8 % — SIGNIFICANT CHANGE UP (ref 20.5–51.1)
MCHC RBC-ENTMCNC: 28.9 PG — SIGNIFICANT CHANGE UP (ref 27–31)
MCHC RBC-ENTMCNC: 33.2 G/DL — SIGNIFICANT CHANGE UP (ref 32–37)
MCV RBC AUTO: 87.2 FL — SIGNIFICANT CHANGE UP (ref 81–99)
MONOCYTES # BLD AUTO: 0.77 K/UL — HIGH (ref 0.1–0.6)
MONOCYTES NFR BLD AUTO: 11.8 % — HIGH (ref 1.7–9.3)
NEUTROPHILS # BLD AUTO: 3.08 K/UL — SIGNIFICANT CHANGE UP (ref 1.4–6.5)
NEUTROPHILS NFR BLD AUTO: 47.1 % — SIGNIFICANT CHANGE UP (ref 42.2–75.2)
NRBC # BLD: 0 /100 WBCS — SIGNIFICANT CHANGE UP (ref 0–0)
PLATELET # BLD AUTO: 241 K/UL — SIGNIFICANT CHANGE UP (ref 130–400)
POTASSIUM SERPL-MCNC: 4.5 MMOL/L — SIGNIFICANT CHANGE UP (ref 3.5–5)
POTASSIUM SERPL-SCNC: 4.5 MMOL/L — SIGNIFICANT CHANGE UP (ref 3.5–5)
PROT SERPL-MCNC: 7.9 G/DL — SIGNIFICANT CHANGE UP (ref 6–8)
RBC # BLD: 4.6 M/UL — SIGNIFICANT CHANGE UP (ref 4.2–5.4)
RBC # FLD: 13.9 % — SIGNIFICANT CHANGE UP (ref 11.5–14.5)
SODIUM SERPL-SCNC: 132 MMOL/L — LOW (ref 135–146)
TOTAL CHOLESTEROL/HDL RATIO MEASUREMENT: 2.9 RATIO — LOW (ref 4–5.5)
TRIGL SERPL-MCNC: 115 MG/DL — SIGNIFICANT CHANGE UP (ref 10–149)
WBC # BLD: 6.53 K/UL — SIGNIFICANT CHANGE UP (ref 4.8–10.8)
WBC # FLD AUTO: 6.53 K/UL — SIGNIFICANT CHANGE UP (ref 4.8–10.8)

## 2020-02-11 PROCEDURE — 99232 SBSQ HOSP IP/OBS MODERATE 35: CPT

## 2020-02-11 RX ORDER — METFORMIN HYDROCHLORIDE 850 MG/1
1000 TABLET ORAL
Refills: 0 | Status: DISCONTINUED | OUTPATIENT
Start: 2020-02-11 | End: 2020-02-13

## 2020-02-11 RX ORDER — AMLODIPINE BESYLATE 2.5 MG/1
10 TABLET ORAL DAILY
Refills: 0 | Status: DISCONTINUED | OUTPATIENT
Start: 2020-02-11 | End: 2020-02-26

## 2020-02-11 RX ADMIN — Medication 1 MILLIGRAM(S): at 08:12

## 2020-02-11 RX ADMIN — METFORMIN HYDROCHLORIDE 1000 MILLIGRAM(S): 850 TABLET ORAL at 20:05

## 2020-02-11 RX ADMIN — DOLUTEGRAVIR SODIUM 50 MILLIGRAM(S): 25 TABLET, FILM COATED ORAL at 08:09

## 2020-02-11 RX ADMIN — Medication 1 PATCH: at 18:27

## 2020-02-11 RX ADMIN — ATORVASTATIN CALCIUM 10 MILLIGRAM(S): 80 TABLET, FILM COATED ORAL at 20:06

## 2020-02-11 RX ADMIN — Medication 0.5 MILLIGRAM(S): at 08:09

## 2020-02-11 RX ADMIN — ONDANSETRON 4 MILLIGRAM(S): 8 TABLET, FILM COATED ORAL at 21:58

## 2020-02-11 RX ADMIN — DOLUTEGRAVIR SODIUM 50 MILLIGRAM(S): 25 TABLET, FILM COATED ORAL at 20:06

## 2020-02-11 RX ADMIN — AMLODIPINE BESYLATE 10 MILLIGRAM(S): 2.5 TABLET ORAL at 09:10

## 2020-02-11 RX ADMIN — Medication 100 MILLIGRAM(S): at 08:10

## 2020-02-11 RX ADMIN — DARUNAVIR 800 MILLIGRAM(S): 75 TABLET, FILM COATED ORAL at 08:12

## 2020-02-11 RX ADMIN — Medication 0.5 MILLIGRAM(S): at 20:06

## 2020-02-11 RX ADMIN — RITONAVIR 100 MILLIGRAM(S): 100 TABLET, FILM COATED ORAL at 20:06

## 2020-02-11 RX ADMIN — Medication 1 PATCH: at 09:11

## 2020-02-11 RX ADMIN — Medication 1 PATCH: at 08:11

## 2020-02-11 RX ADMIN — METFORMIN HYDROCHLORIDE 1000 MILLIGRAM(S): 850 TABLET ORAL at 09:10

## 2020-02-11 RX ADMIN — Medication 1 TABLET(S): at 08:13

## 2020-02-11 RX ADMIN — HALOPERIDOL DECANOATE 10 MILLIGRAM(S): 100 INJECTION INTRAMUSCULAR at 20:05

## 2020-02-11 NOTE — CHART NOTE - NSCHARTNOTEFT_GEN_A_CORE
Called by RN that pt does not have her dentures and is having difficulty eating a regular diet. Will change to soft diet

## 2020-02-11 NOTE — PROGRESS NOTE ADULT - PROBLEM SELECTOR PLAN 4
continue present treatment as per psych plan as reviewed  Medically stable with changers as per note  will continue to monitor medical status while being treated on psych

## 2020-02-11 NOTE — PROGRESS NOTE ADULT - SUBJECTIVE AND OBJECTIVE BOX
pt stable alert in NAD  no new complaints    DEPRESSION  ^DEPRESSION  Handoff  High cholesterol  Diabetes  HTN (hypertension)  HIV (human immunodeficiency virus infection)  HIV infection, unspecified symptom status  Cocaine use disorder  Disorganized schizophrenia  Psychosis, unspecified psychosis type  Type 2 diabetes mellitus without complication, without long-term current use of insulin  HIV (human immunodeficiency virus infection)  HTN (hypertension)  Diabetes  Psychosis    HEALTH ISSUES - PROBLEM Dx:  HIV infection, unspecified symptom status  Cocaine use disorder  Disorganized schizophrenia  Psychosis, unspecified psychosis type: Psychosis, unspecified psychosis type  Type 2 diabetes mellitus without complication, without long-term current use of insulin: Type 2 diabetes mellitus without complication, without long-term current use of insulin  HIV (human immunodeficiency virus infection): HIV (human immunodeficiency virus infection)  HTN (hypertension): HTN (hypertension)  Diabetes: Diabetes  Psychosis: Psychosis        PAST MEDICAL & SURGICAL HISTORY:  High cholesterol  Diabetes  HTN (hypertension)  HIV (human immunodeficiency virus infection)    No Known Allergies      FAMILY HISTORY:      aluminum hydroxide/magnesium hydroxide/simethicone Suspension 30 milliLiter(s) Oral every 4 hours PRN  amLODIPine   Tablet 5 milliGRAM(s) Oral daily  atorvastatin 10 milliGRAM(s) Oral at bedtime  benztropine 0.5 milliGRAM(s) Oral two times a day  darunavir 800 milliGRAM(s) Oral daily  dolutegravir 50 milliGRAM(s) Oral two times a day  folic acid 1 milliGRAM(s) Oral daily  haloperidol     Tablet 10 milliGRAM(s) Oral at bedtime  hydrOXYzine hydrochloride 50 milliGRAM(s) Oral every 6 hours PRN  influenza   Vaccine 0.5 milliLiter(s) IntraMuscular once  metFORMIN 500 milliGRAM(s) Oral two times a day  nicotine -  14 mG/24Hr(s) Patch 1 patch Transdermal daily  ondansetron    Tablet 4 milliGRAM(s) Oral once  ondansetron   Disintegrating Tablet 4 milliGRAM(s) Oral every 8 hours PRN  propranolol 10 milliGRAM(s) Oral two times a day  ritonavir Tablet 100 milliGRAM(s) Oral every 24 hours  thiamine 100 milliGRAM(s) Oral daily  trimethoprim  160 mG/sulfamethoxazole 800 mG 1 Tablet(s) Oral daily      T(C): 36.5 (02-11-20 @ 06:03), Max: 36.5 (02-11-20 @ 06:03)  HR: 92 (02-11-20 @ 06:03) (92 - 99)  BP: 156/86 (02-11-20 @ 06:03) (117/57 - 156/86)  RR: 16 (02-11-20 @ 06:03) (16 - 20)  SpO2: --    PE;  general:  no acute changes in nad    Lungs:    Heart:    EXT:    Neuro:  aelrt no derciits                          CAPILLARY BLOOD GLUCOSE      POCT Blood Glucose.: 125 mg/dL (11 Feb 2020 06:34)  POCT Blood Glucose.: 164 mg/dL (10 Feb 2020 16:15)

## 2020-02-12 DIAGNOSIS — F05 DELIRIUM DUE TO KNOWN PHYSIOLOGICAL CONDITION: ICD-10-CM

## 2020-02-12 DIAGNOSIS — F17.210 NICOTINE DEPENDENCE, CIGARETTES, UNCOMPLICATED: ICD-10-CM

## 2020-02-12 DIAGNOSIS — F39 UNSPECIFIED MOOD [AFFECTIVE] DISORDER: ICD-10-CM

## 2020-02-12 DIAGNOSIS — F20.9 SCHIZOPHRENIA, UNSPECIFIED: ICD-10-CM

## 2020-02-12 DIAGNOSIS — Z79.891 LONG TERM (CURRENT) USE OF OPIATE ANALGESIC: ICD-10-CM

## 2020-02-12 DIAGNOSIS — E11.9 TYPE 2 DIABETES MELLITUS WITHOUT COMPLICATIONS: ICD-10-CM

## 2020-02-12 DIAGNOSIS — F14.19 COCAINE ABUSE WITH UNSPECIFIED COCAINE-INDUCED DISORDER: ICD-10-CM

## 2020-02-12 DIAGNOSIS — Z79.52 LONG TERM (CURRENT) USE OF SYSTEMIC STEROIDS: ICD-10-CM

## 2020-02-12 DIAGNOSIS — I10 ESSENTIAL (PRIMARY) HYPERTENSION: ICD-10-CM

## 2020-02-12 DIAGNOSIS — R82.71 BACTERIURIA: ICD-10-CM

## 2020-02-12 DIAGNOSIS — B20 HUMAN IMMUNODEFICIENCY VIRUS [HIV] DISEASE: ICD-10-CM

## 2020-02-12 DIAGNOSIS — Z79.2 LONG TERM (CURRENT) USE OF ANTIBIOTICS: ICD-10-CM

## 2020-02-12 DIAGNOSIS — E78.5 HYPERLIPIDEMIA, UNSPECIFIED: ICD-10-CM

## 2020-02-12 DIAGNOSIS — R41.82 ALTERED MENTAL STATUS, UNSPECIFIED: ICD-10-CM

## 2020-02-12 DIAGNOSIS — Z91.14 PATIENT'S OTHER NONCOMPLIANCE WITH MEDICATION REGIMEN: ICD-10-CM

## 2020-02-12 DIAGNOSIS — G93.41 METABOLIC ENCEPHALOPATHY: ICD-10-CM

## 2020-02-12 DIAGNOSIS — Z21 ASYMPTOMATIC HUMAN IMMUNODEFICIENCY VIRUS [HIV] INFECTION STATUS: ICD-10-CM

## 2020-02-12 LAB
ALBUMIN SERPL ELPH-MCNC: 4.1 G/DL — SIGNIFICANT CHANGE UP (ref 3.5–5.2)
ALP SERPL-CCNC: 85 U/L — SIGNIFICANT CHANGE UP (ref 30–115)
ALT FLD-CCNC: 17 U/L — SIGNIFICANT CHANGE UP (ref 0–41)
ANION GAP SERPL CALC-SCNC: 16 MMOL/L — HIGH (ref 7–14)
AST SERPL-CCNC: 24 U/L — SIGNIFICANT CHANGE UP (ref 0–41)
BILIRUB SERPL-MCNC: 0.3 MG/DL — SIGNIFICANT CHANGE UP (ref 0.2–1.2)
BUN SERPL-MCNC: 30 MG/DL — HIGH (ref 10–20)
CALCIUM SERPL-MCNC: 11.1 MG/DL — HIGH (ref 8.5–10.1)
CHLORIDE SERPL-SCNC: 89 MMOL/L — LOW (ref 98–110)
CO2 SERPL-SCNC: 24 MMOL/L — SIGNIFICANT CHANGE UP (ref 17–32)
CREAT SERPL-MCNC: 2.1 MG/DL — HIGH (ref 0.7–1.5)
GLUCOSE BLDC GLUCOMTR-MCNC: 132 MG/DL — HIGH (ref 70–99)
GLUCOSE BLDC GLUCOMTR-MCNC: 165 MG/DL — HIGH (ref 70–99)
GLUCOSE SERPL-MCNC: 168 MG/DL — HIGH (ref 70–99)
HCT VFR BLD CALC: 40.9 % — SIGNIFICANT CHANGE UP (ref 37–47)
HGB BLD-MCNC: 13.7 G/DL — SIGNIFICANT CHANGE UP (ref 12–16)
MCHC RBC-ENTMCNC: 29.2 PG — SIGNIFICANT CHANGE UP (ref 27–31)
MCHC RBC-ENTMCNC: 33.5 G/DL — SIGNIFICANT CHANGE UP (ref 32–37)
MCV RBC AUTO: 87.2 FL — SIGNIFICANT CHANGE UP (ref 81–99)
NRBC # BLD: 0 /100 WBCS — SIGNIFICANT CHANGE UP (ref 0–0)
PLATELET # BLD AUTO: 287 K/UL — SIGNIFICANT CHANGE UP (ref 130–400)
POTASSIUM SERPL-MCNC: 5.1 MMOL/L — HIGH (ref 3.5–5)
POTASSIUM SERPL-SCNC: 5.1 MMOL/L — HIGH (ref 3.5–5)
PROT SERPL-MCNC: 8.2 G/DL — HIGH (ref 6–8)
RBC # BLD: 4.69 M/UL — SIGNIFICANT CHANGE UP (ref 4.2–5.4)
RBC # FLD: 13.9 % — SIGNIFICANT CHANGE UP (ref 11.5–14.5)
SODIUM SERPL-SCNC: 129 MMOL/L — LOW (ref 135–146)
TSH SERPL-MCNC: 0.77 UIU/ML — SIGNIFICANT CHANGE UP (ref 0.27–4.2)
WBC # BLD: 7 K/UL — SIGNIFICANT CHANGE UP (ref 4.8–10.8)
WBC # FLD AUTO: 7 K/UL — SIGNIFICANT CHANGE UP (ref 4.8–10.8)

## 2020-02-12 PROCEDURE — 99231 SBSQ HOSP IP/OBS SF/LOW 25: CPT

## 2020-02-12 RX ORDER — MAGNESIUM HYDROXIDE 400 MG/1
30 TABLET, CHEWABLE ORAL ONCE
Refills: 0 | Status: COMPLETED | OUTPATIENT
Start: 2020-02-12 | End: 2020-02-13

## 2020-02-12 RX ADMIN — AMLODIPINE BESYLATE 10 MILLIGRAM(S): 2.5 TABLET ORAL at 08:08

## 2020-02-12 RX ADMIN — DOLUTEGRAVIR SODIUM 50 MILLIGRAM(S): 25 TABLET, FILM COATED ORAL at 08:09

## 2020-02-12 RX ADMIN — Medication 0.5 MILLIGRAM(S): at 08:08

## 2020-02-12 RX ADMIN — HALOPERIDOL DECANOATE 10 MILLIGRAM(S): 100 INJECTION INTRAMUSCULAR at 20:23

## 2020-02-12 RX ADMIN — Medication 0.5 MILLIGRAM(S): at 20:25

## 2020-02-12 RX ADMIN — METFORMIN HYDROCHLORIDE 1000 MILLIGRAM(S): 850 TABLET ORAL at 08:08

## 2020-02-12 RX ADMIN — Medication 30 MILLILITER(S): at 20:05

## 2020-02-12 RX ADMIN — ONDANSETRON 4 MILLIGRAM(S): 8 TABLET, FILM COATED ORAL at 20:05

## 2020-02-12 RX ADMIN — Medication 1 PATCH: at 07:35

## 2020-02-12 RX ADMIN — DARUNAVIR 800 MILLIGRAM(S): 75 TABLET, FILM COATED ORAL at 08:08

## 2020-02-12 RX ADMIN — ATORVASTATIN CALCIUM 10 MILLIGRAM(S): 80 TABLET, FILM COATED ORAL at 20:23

## 2020-02-12 RX ADMIN — Medication 1 PATCH: at 08:08

## 2020-02-12 RX ADMIN — Medication 1 MILLIGRAM(S): at 08:08

## 2020-02-12 RX ADMIN — Medication 100 MILLIGRAM(S): at 08:08

## 2020-02-12 RX ADMIN — Medication 1 TABLET(S): at 08:08

## 2020-02-12 NOTE — CHART NOTE - NSCHARTNOTEFT_GEN_A_CORE
The treatment team met with pt. to discuss treatment plan, medications and discharge plan.  Pt. expresses some thoughts in a lucid manner, however, many of her thoughts are disorganized and unclear.  Pt. was able to express that she would like a sponsor to assist her with her recovery.  She did verbalize an interest in remaining illicit drug free.  Pt. has been largely isolative to her room and was encouraged to engage in unit activities.   Pt. was educated to the benefits of doing so.  Pt.'s medical issues also prevent her from ambulating and engaging on the unit.    Writer was unable to discuss discharge planning with pt. due to her disorganized thought process.  A referral to [facility] is being explored should pt. continue to compensate.  Pt.'s living situation is unclear at this time and the treatment team will continue to communicate with pt.'s family.    Mental Status Exam:    Mood-  Anxious    Sleep-  Normal    Appetite-  Fair    ADL's-  Poor/Fair    Observation-  Constant    Pt. is not stable for discharge at this time.

## 2020-02-12 NOTE — PHYSICAL THERAPY INITIAL EVALUATION ADULT - DISCHARGE DISPOSITION, PT EVAL
no skilled PT needs/Pt able to ambulate and perform transfers independently without assistive device; pt encountered ambulating in the hallway independently. Not a candidate for skilled PT at this time. Will follow-up as appropriate./home w/ outpatient services

## 2020-02-12 NOTE — PHYSICAL THERAPY INITIAL EVALUATION ADULT - GENERAL OBSERVATIONS, REHAB EVAL
9:16-9:35. Chart reviewed; confirmed with RN to see the pt for PT. Pt ready for PT; received ambulating in the hallway with no complain of pain and in NAD. Agreeable for PT evaluation.

## 2020-02-13 DIAGNOSIS — E83.52 HYPERCALCEMIA: ICD-10-CM

## 2020-02-13 DIAGNOSIS — E87.1 HYPO-OSMOLALITY AND HYPONATREMIA: ICD-10-CM

## 2020-02-13 DIAGNOSIS — F14.21 COCAINE DEPENDENCE, IN REMISSION: ICD-10-CM

## 2020-02-13 DIAGNOSIS — N18.3 CHRONIC KIDNEY DISEASE, STAGE 3 (MODERATE): ICD-10-CM

## 2020-02-13 LAB
ALBUMIN SERPL ELPH-MCNC: 4 G/DL — SIGNIFICANT CHANGE UP (ref 3.5–5.2)
ALP SERPL-CCNC: 88 U/L — SIGNIFICANT CHANGE UP (ref 30–115)
ALT FLD-CCNC: 16 U/L — SIGNIFICANT CHANGE UP (ref 0–41)
ANION GAP SERPL CALC-SCNC: 19 MMOL/L — HIGH (ref 7–14)
AST SERPL-CCNC: 23 U/L — SIGNIFICANT CHANGE UP (ref 0–41)
BILIRUB SERPL-MCNC: 0.4 MG/DL — SIGNIFICANT CHANGE UP (ref 0.2–1.2)
BUN SERPL-MCNC: 28 MG/DL — HIGH (ref 10–20)
CALCIUM SERPL-MCNC: 10.9 MG/DL — HIGH (ref 8.5–10.1)
CALCIUM SERPL-MCNC: 10.9 MG/DL — HIGH (ref 8.5–10.1)
CHLORIDE SERPL-SCNC: 87 MMOL/L — LOW (ref 98–110)
CO2 SERPL-SCNC: 23 MMOL/L — SIGNIFICANT CHANGE UP (ref 17–32)
CREAT SERPL-MCNC: 1.9 MG/DL — HIGH (ref 0.7–1.5)
GLUCOSE BLDC GLUCOMTR-MCNC: 255 MG/DL — HIGH (ref 70–99)
GLUCOSE SERPL-MCNC: 138 MG/DL — HIGH (ref 70–99)
POTASSIUM SERPL-MCNC: 5.7 MMOL/L — HIGH (ref 3.5–5)
POTASSIUM SERPL-SCNC: 5.7 MMOL/L — HIGH (ref 3.5–5)
PROT SERPL-MCNC: 8 G/DL — SIGNIFICANT CHANGE UP (ref 6–8)
SODIUM SERPL-SCNC: 129 MMOL/L — LOW (ref 135–146)

## 2020-02-13 PROCEDURE — 99231 SBSQ HOSP IP/OBS SF/LOW 25: CPT

## 2020-02-13 RX ORDER — GLIMEPIRIDE 1 MG
4 TABLET ORAL
Refills: 0 | Status: DISCONTINUED | OUTPATIENT
Start: 2020-02-13 | End: 2020-02-26

## 2020-02-13 RX ORDER — GUAIFENESIN/DEXTROMETHORPHAN 600MG-30MG
10 TABLET, EXTENDED RELEASE 12 HR ORAL EVERY 6 HOURS
Refills: 0 | Status: DISCONTINUED | OUTPATIENT
Start: 2020-02-13 | End: 2020-02-27

## 2020-02-13 RX ADMIN — Medication 0.5 MILLIGRAM(S): at 20:45

## 2020-02-13 RX ADMIN — DARUNAVIR 800 MILLIGRAM(S): 75 TABLET, FILM COATED ORAL at 08:04

## 2020-02-13 RX ADMIN — MAGNESIUM HYDROXIDE 30 MILLILITER(S): 400 TABLET, CHEWABLE ORAL at 14:38

## 2020-02-13 RX ADMIN — Medication 10 MILLILITER(S): at 09:48

## 2020-02-13 RX ADMIN — DOLUTEGRAVIR SODIUM 50 MILLIGRAM(S): 25 TABLET, FILM COATED ORAL at 08:04

## 2020-02-13 RX ADMIN — Medication 30 MILLILITER(S): at 09:01

## 2020-02-13 RX ADMIN — HALOPERIDOL DECANOATE 10 MILLIGRAM(S): 100 INJECTION INTRAMUSCULAR at 20:44

## 2020-02-13 RX ADMIN — RITONAVIR 100 MILLIGRAM(S): 100 TABLET, FILM COATED ORAL at 20:44

## 2020-02-13 RX ADMIN — AMLODIPINE BESYLATE 10 MILLIGRAM(S): 2.5 TABLET ORAL at 08:04

## 2020-02-13 RX ADMIN — Medication 1 MILLIGRAM(S): at 08:04

## 2020-02-13 RX ADMIN — DOLUTEGRAVIR SODIUM 50 MILLIGRAM(S): 25 TABLET, FILM COATED ORAL at 20:44

## 2020-02-13 RX ADMIN — Medication 100 MILLIGRAM(S): at 08:04

## 2020-02-13 RX ADMIN — Medication 0.5 MILLIGRAM(S): at 08:04

## 2020-02-13 RX ADMIN — Medication 1 TABLET(S): at 08:04

## 2020-02-13 RX ADMIN — ATORVASTATIN CALCIUM 10 MILLIGRAM(S): 80 TABLET, FILM COATED ORAL at 20:45

## 2020-02-13 RX ADMIN — METFORMIN HYDROCHLORIDE 1000 MILLIGRAM(S): 850 TABLET ORAL at 08:04

## 2020-02-13 NOTE — PROGRESS NOTE BEHAVIORAL HEALTH - DETAILS
no vomiting today, had vomiting on 2/12/20, nausea noted today, thought to be secondary to restarting HIV medications.

## 2020-02-13 NOTE — PROGRESS NOTE ADULT - ASSESSMENT
medically stable eelvatd bs adn low na  na correct fro bs about 132  calcium also eelvasted   increasded bun creeat also

## 2020-02-13 NOTE — PROGRESS NOTE ADULT - SUBJECTIVE AND OBJECTIVE BOX
pt stable alert in NAD  reviwed with psych adn labs all ok   had few epidoes of vomitig  last 2 days  bs also high    DEPRESSION  ^DEPRESSION  Handoff  High cholesterol  Diabetes  HTN (hypertension)  HIV (human immunodeficiency virus infection)  HIV infection, unspecified symptom status  Alcohol use disorder, severe, in controlled environment  HIV infection, unspecified symptom status  Cocaine use disorder  Disorganized schizophrenia  Psychosis, unspecified psychosis type  Type 2 diabetes mellitus without complication, without long-term current use of insulin  HIV (human immunodeficiency virus infection)  HTN (hypertension)  Diabetes  Psychosis    HEALTH ISSUES - PROBLEM Dx:  HIV infection, unspecified symptom status  Alcohol use disorder, severe, in controlled environment  HIV infection, unspecified symptom status  Cocaine use disorder  Disorganized schizophrenia  Psychosis, unspecified psychosis type: Psychosis, unspecified psychosis type  Type 2 diabetes mellitus without complication, without long-term current use of insulin: Type 2 diabetes mellitus without complication, without long-term current use of insulin  HIV (human immunodeficiency virus infection): HIV (human immunodeficiency virus infection)  HTN (hypertension): HTN (hypertension)  Diabetes: Diabetes  Psychosis: Psychosis        PAST MEDICAL & SURGICAL HISTORY:  High cholesterol  Diabetes  HTN (hypertension)  HIV (human immunodeficiency virus infection)    No Known Allergies      FAMILY HISTORY:      aluminum hydroxide/magnesium hydroxide/simethicone Suspension 30 milliLiter(s) Oral every 4 hours PRN  amLODIPine   Tablet 10 milliGRAM(s) Oral daily  atorvastatin 10 milliGRAM(s) Oral at bedtime  benztropine 0.5 milliGRAM(s) Oral two times a day  darunavir 800 milliGRAM(s) Oral daily  dolutegravir 50 milliGRAM(s) Oral two times a day  folic acid 1 milliGRAM(s) Oral daily  guaifenesin/dextromethorphan  Syrup 10 milliLiter(s) Oral every 6 hours PRN  haloperidol     Tablet 10 milliGRAM(s) Oral at bedtime  hydrOXYzine hydrochloride 50 milliGRAM(s) Oral every 6 hours PRN  influenza   Vaccine 0.5 milliLiter(s) IntraMuscular once  magnesium hydroxide Suspension 30 milliLiter(s) Oral once  metFORMIN 1000 milliGRAM(s) Oral two times a day  nicotine -  14 mG/24Hr(s) Patch 1 patch Transdermal daily  ondansetron    Tablet 4 milliGRAM(s) Oral once  propranolol 10 milliGRAM(s) Oral two times a day  ritonavir Tablet 100 milliGRAM(s) Oral every 24 hours  thiamine 100 milliGRAM(s) Oral daily  trimethoprim  160 mG/sulfamethoxazole 800 mG 1 Tablet(s) Oral daily      T(C): 36.1 (02-13-20 @ 08:26), Max: 37.1 (02-13-20 @ 05:39)  HR: 105 (02-13-20 @ 08:26) (99 - 115)  BP: 138/93 (02-13-20 @ 08:26) (120/77 - 144/78)  RR: 16 (02-13-20 @ 08:26) (16 - 18)  SpO2: --    PE;  general:  stable alert in nad    Lungs:    Heart:    EXT:    Neuro:  aelrt nod eficits      13.7  40.9  7.00  30  2.1  5.1  168  13.3  40.1  6.53  20  1.5  4.5  149      02-12    129<L>  |  89<L>  |  30<H>  ----------------------------<  168<H>  5.1<H>   |  24  |  2.1<H>    Ca    11.1<H>      12 Feb 2020 10:06    TPro  8.2<H>  /  Alb  4.1  /  TBili  0.3  /  DBili  x   /  AST  24  /  ALT  17  /  AlkPhos  85  02-12      LIVER FUNCTIONS - ( 12 Feb 2020 10:06 )  Alb: 4.1 g/dL / Pro: 8.2 g/dL / ALK PHOS: 85 U/L / ALT: 17 U/L / AST: 24 U/L / GGT: x                                   13.7   7.00  )-----------( 287      ( 12 Feb 2020 10:06 )             40.9       CAPILLARY BLOOD GLUCOSE      POCT Blood Glucose.: 255 mg/dL (13 Feb 2020 06:47)  POCT Blood Glucose.: 165 mg/dL (12 Feb 2020 16:08)

## 2020-02-14 DIAGNOSIS — B20 HUMAN IMMUNODEFICIENCY VIRUS [HIV] DISEASE: ICD-10-CM

## 2020-02-14 DIAGNOSIS — F19.10 OTHER PSYCHOACTIVE SUBSTANCE ABUSE, UNCOMPLICATED: ICD-10-CM

## 2020-02-14 LAB
ALBUMIN SERPL ELPH-MCNC: 3.6 G/DL — SIGNIFICANT CHANGE UP (ref 3.5–5.2)
ALP SERPL-CCNC: 82 U/L — SIGNIFICANT CHANGE UP (ref 30–115)
ALT FLD-CCNC: 13 U/L — SIGNIFICANT CHANGE UP (ref 0–41)
ANION GAP SERPL CALC-SCNC: 12 MMOL/L — SIGNIFICANT CHANGE UP (ref 7–14)
AST SERPL-CCNC: 20 U/L — SIGNIFICANT CHANGE UP (ref 0–41)
BILIRUB SERPL-MCNC: 0.3 MG/DL — SIGNIFICANT CHANGE UP (ref 0.2–1.2)
BUN SERPL-MCNC: 22 MG/DL — HIGH (ref 10–20)
CALCIUM SERPL-MCNC: 10 MG/DL — SIGNIFICANT CHANGE UP (ref 8.5–10.1)
CALCIUM SERPL-MCNC: 11.1 MG/DL — HIGH (ref 8.4–10.5)
CHLORIDE SERPL-SCNC: 92 MMOL/L — LOW (ref 98–110)
CO2 SERPL-SCNC: 22 MMOL/L — SIGNIFICANT CHANGE UP (ref 17–32)
CREAT ?TM UR-MCNC: 53 MG/DL — SIGNIFICANT CHANGE UP
CREAT SERPL-MCNC: 1.6 MG/DL — HIGH (ref 0.7–1.5)
GLUCOSE BLDC GLUCOMTR-MCNC: 114 MG/DL — HIGH (ref 70–99)
GLUCOSE BLDC GLUCOMTR-MCNC: 118 MG/DL — HIGH (ref 70–99)
GLUCOSE SERPL-MCNC: 293 MG/DL — HIGH (ref 70–99)
HCT VFR BLD CALC: 37 % — SIGNIFICANT CHANGE UP (ref 37–47)
HGB BLD-MCNC: 12.4 G/DL — SIGNIFICANT CHANGE UP (ref 12–16)
MCHC RBC-ENTMCNC: 29.3 PG — SIGNIFICANT CHANGE UP (ref 27–31)
MCHC RBC-ENTMCNC: 33.5 G/DL — SIGNIFICANT CHANGE UP (ref 32–37)
MCV RBC AUTO: 87.5 FL — SIGNIFICANT CHANGE UP (ref 81–99)
NRBC # BLD: 0 /100 WBCS — SIGNIFICANT CHANGE UP (ref 0–0)
OSMOLALITY UR: 276 MOS/KG — SIGNIFICANT CHANGE UP (ref 50–1400)
PLATELET # BLD AUTO: 245 K/UL — SIGNIFICANT CHANGE UP (ref 130–400)
POTASSIUM SERPL-MCNC: 4.8 MMOL/L — SIGNIFICANT CHANGE UP (ref 3.5–5)
POTASSIUM SERPL-SCNC: 4.8 MMOL/L — SIGNIFICANT CHANGE UP (ref 3.5–5)
PROT SERPL-MCNC: 7 G/DL — SIGNIFICANT CHANGE UP (ref 6–8)
PTH-INTACT FLD-MCNC: 39 PG/ML — SIGNIFICANT CHANGE UP (ref 15–65)
RBC # BLD: 4.23 M/UL — SIGNIFICANT CHANGE UP (ref 4.2–5.4)
RBC # FLD: 13.7 % — SIGNIFICANT CHANGE UP (ref 11.5–14.5)
SODIUM SERPL-SCNC: 126 MMOL/L — LOW (ref 135–146)
SODIUM UR-SCNC: 37 MMOL/L — SIGNIFICANT CHANGE UP
WBC # BLD: 6.09 K/UL — SIGNIFICANT CHANGE UP (ref 4.8–10.8)
WBC # FLD AUTO: 6.09 K/UL — SIGNIFICANT CHANGE UP (ref 4.8–10.8)

## 2020-02-14 RX ADMIN — Medication 50 MILLIGRAM(S): at 20:21

## 2020-02-14 RX ADMIN — DOLUTEGRAVIR SODIUM 50 MILLIGRAM(S): 25 TABLET, FILM COATED ORAL at 09:16

## 2020-02-14 RX ADMIN — Medication 0.5 MILLIGRAM(S): at 20:21

## 2020-02-14 RX ADMIN — Medication 1 MILLIGRAM(S): at 09:15

## 2020-02-14 RX ADMIN — Medication 0.5 MILLIGRAM(S): at 09:16

## 2020-02-14 RX ADMIN — RITONAVIR 100 MILLIGRAM(S): 100 TABLET, FILM COATED ORAL at 22:32

## 2020-02-14 RX ADMIN — AMLODIPINE BESYLATE 10 MILLIGRAM(S): 2.5 TABLET ORAL at 09:16

## 2020-02-14 RX ADMIN — DOLUTEGRAVIR SODIUM 50 MILLIGRAM(S): 25 TABLET, FILM COATED ORAL at 20:21

## 2020-02-14 RX ADMIN — Medication 4 MILLIGRAM(S): at 09:16

## 2020-02-14 RX ADMIN — HALOPERIDOL DECANOATE 10 MILLIGRAM(S): 100 INJECTION INTRAMUSCULAR at 20:21

## 2020-02-14 RX ADMIN — Medication 100 MILLIGRAM(S): at 09:15

## 2020-02-14 RX ADMIN — Medication 1 PATCH: at 10:14

## 2020-02-14 RX ADMIN — ATORVASTATIN CALCIUM 10 MILLIGRAM(S): 80 TABLET, FILM COATED ORAL at 20:27

## 2020-02-14 RX ADMIN — Medication 1 TABLET(S): at 09:15

## 2020-02-14 RX ADMIN — DARUNAVIR 800 MILLIGRAM(S): 75 TABLET, FILM COATED ORAL at 09:16

## 2020-02-14 RX ADMIN — Medication 1 PATCH: at 19:30

## 2020-02-14 NOTE — CONSULT NOTE ADULT - PROBLEM SELECTOR RECOMMENDATION 3
needs detox eval   chronic issue
continue present treatment as per psych plan as reviewed  Medically stable with no new changes in treatment  will continue to monitor medical status while being treated on psych

## 2020-02-14 NOTE — PROGRESS NOTE ADULT - SUBJECTIVE AND OBJECTIVE BOX
pt stable alert in NAD  no new complaints    DEPRESSION  ^DEPRESSION  Handoff  High cholesterol  Diabetes  HTN (hypertension)  HIV (human immunodeficiency virus infection)  Polysubstance abuse  Symptomatic HIV infection  Cocaine use disorder, severe, in sustained remission, in controlled environment  Hyponatremia  Hypercalcemia  CKD (chronic kidney disease) stage 3, GFR 30-59 ml/min  HIV infection, unspecified symptom status  Alcohol use disorder, severe, in controlled environment  HIV infection, unspecified symptom status  Cocaine use disorder  Disorganized schizophrenia  Psychosis, unspecified psychosis type  Type 2 diabetes mellitus without complication, without long-term current use of insulin  HIV (human immunodeficiency virus infection)  HTN (hypertension)  Diabetes  Psychosis    HEALTH ISSUES - PROBLEM Dx:  Polysubstance abuse: Polysubstance abuse  Symptomatic HIV infection: Symptomatic HIV infection  Cocaine use disorder, severe, in sustained remission, in controlled environment  Hyponatremia: Hyponatremia  Hypercalcemia: Hypercalcemia  CKD (chronic kidney disease) stage 3, GFR 30-59 ml/min: CKD (chronic kidney disease) stage 3, GFR 30-59 ml/min  HIV infection, unspecified symptom status  Alcohol use disorder, severe, in controlled environment  HIV infection, unspecified symptom status  Cocaine use disorder  Disorganized schizophrenia  Psychosis, unspecified psychosis type: Psychosis, unspecified psychosis type  Type 2 diabetes mellitus without complication, without long-term current use of insulin: Type 2 diabetes mellitus without complication, without long-term current use of insulin  HIV (human immunodeficiency virus infection): HIV (human immunodeficiency virus infection)  HTN (hypertension): HTN (hypertension)  Diabetes: Diabetes  Psychosis: Psychosis        PAST MEDICAL & SURGICAL HISTORY:  High cholesterol  Diabetes  HTN (hypertension)  HIV (human immunodeficiency virus infection)    No Known Allergies      FAMILY HISTORY:      aluminum hydroxide/magnesium hydroxide/simethicone Suspension 30 milliLiter(s) Oral every 4 hours PRN  amLODIPine   Tablet 10 milliGRAM(s) Oral daily  atorvastatin 10 milliGRAM(s) Oral at bedtime  benztropine 0.5 milliGRAM(s) Oral two times a day  darunavir 800 milliGRAM(s) Oral daily  dolutegravir 50 milliGRAM(s) Oral two times a day  folic acid 1 milliGRAM(s) Oral daily  glimepiride. 4 milliGRAM(s) Oral with breakfast  guaifenesin/dextromethorphan  Syrup 10 milliLiter(s) Oral every 6 hours PRN  haloperidol     Tablet 10 milliGRAM(s) Oral at bedtime  hydrOXYzine hydrochloride 50 milliGRAM(s) Oral every 6 hours PRN  influenza   Vaccine 0.5 milliLiter(s) IntraMuscular once  nicotine -  14 mG/24Hr(s) Patch 1 patch Transdermal daily  ondansetron    Tablet 4 milliGRAM(s) Oral once  propranolol 10 milliGRAM(s) Oral two times a day  ritonavir Tablet 100 milliGRAM(s) Oral every 24 hours  thiamine 100 milliGRAM(s) Oral daily  trimethoprim  160 mG/sulfamethoxazole 800 mG 1 Tablet(s) Oral daily      T(C): 36.6 (02-14-20 @ 08:22), Max: 37.1 (02-13-20 @ 16:30)  HR: 97 (02-14-20 @ 08:22) (86 - 97)  BP: 103/69 (02-14-20 @ 08:22) (103/69 - 116/80)  RR: 18 (02-14-20 @ 08:22) (17 - 18)  SpO2: --    PE;  general: stable in nad    Lungs:    Heart:    EXT:    Neuro:  no changes noted      --  --  --  28  1.9  5.7  138  13.7  40.9  7.00  30  2.1  5.1  168  13.3  40.1  6.53  20  1.5  4.5  149      02-13    129<L>  |  87<L>  |  28<H>  ----------------------------<  138<H>  5.7<H>   |  23  |  1.9<H>    Ca    10.9<H>      13 Feb 2020 12:00    TPro  8.0  /  Alb  4.0  /  TBili  0.4  /  DBili  x   /  AST  23  /  ALT  16  /  AlkPhos  88  02-13      LIVER FUNCTIONS - ( 13 Feb 2020 12:00 )  Alb: 4.0 g/dL / Pro: 8.0 g/dL / ALK PHOS: 88 U/L / ALT: 16 U/L / AST: 23 U/L / GGT: x                                   13.7   7.00  )-----------( 287      ( 12 Feb 2020 10:06 )             40.9       CAPILLARY BLOOD GLUCOSE      POCT Blood Glucose.: 114 mg/dL (14 Feb 2020 06:36)

## 2020-02-14 NOTE — CONSULT NOTE ADULT - SUBJECTIVE AND OBJECTIVE BOX
LENA AGUIRRE  64y, Female  Allergy: No Known Allergies      CHIEF COMPLAINT: psychosis (13 Feb 2020 08:28)      HPI:  Per Psych notes: Patient's daughter Mario Aguirre ( [phone]), reports that patient has become more confused , paranoid , disorganised and hearing voices for about a year since she stopped taking her psychiatric medication. She reports that patient became romantically involved with someone who introduced her to drugs and alcohol and patient stopped taking care of herself and was not even taking her HIV medication. Patient's daughter states " That woman you see there is not my mother when she takes her psychiatric medication". She reports that she was going to take her to [facility] ER for her to be admitted to the inpatient psychiatric floor for medication adjustment after she is discharged from the medical floor. patient's daughter reports that patient has never been diagnosed with dementia and her current behavior is because she was not taking her medication.    Pt was initially admitted to medicine for mental status changes (07 Feb 2020 21:37)    FAMILY HISTORY:    PAST MEDICAL & SURGICAL HISTORY:  High cholesterol  Diabetes  HTN (hypertension)  HIV (human immunodeficiency virus infection)      Substance Use (  ) never used  (  ) IVDU ( X ) Other:  Tobacco Usage:  (   ) never smoked   (   ) former smoker   ( X  ) current smoker   Alcohol Usage: (   ) social  (   ) daily use (   ) denies  Sexual History:       ROS  10 system review - neg     VITALS:  T(F): 98.8, Max: 98.8 (02-13-20 @ 16:30)  HR: 86  BP: 116/80  RR: 17Vital Signs Last 24 Hrs  T(C): 37.1 (13 Feb 2020 16:30), Max: 37.1 (13 Feb 2020 16:30)  T(F): 98.8 (13 Feb 2020 16:30), Max: 98.8 (13 Feb 2020 16:30)  HR: 86 (13 Feb 2020 16:30) (86 - 105)  BP: 116/80 (13 Feb 2020 16:30) (116/80 - 138/93)  BP(mean): --  RR: 17 (13 Feb 2020 16:30) (16 - 17)  SpO2: --    PHYSICAL EXAM:  Gen: NAD, resting in bed  HEENT: Normocephalic, atraumatic  Neck: supple, no lymphadenopathy  CV: s1 s 2 +   Lungs: clear   Abdomen: Soft, BS present  Ext: Warm, well perfused  Neuro: non focal, awake  Skin: no rash, no erythema    TESTS & MEASUREMENTS:                        13.7   7.00  )-----------( 287      ( 12 Feb 2020 10:06 )             40.9     02-13    129<L>  |  87<L>  |  28<H>  ----------------------------<  138<H>  5.7<H>   |  23  |  1.9<H>    Ca    10.9<H>      13 Feb 2020 12:00    TPro  8.0  /  Alb  4.0  /  TBili  0.4  /  DBili  x   /  AST  23  /  ALT  16  /  AlkPhos  88  02-13    eGFR if Non African American: 28 mL/min/1.73M2 (02-13-20 @ 12:00)  eGFR if African American: 32 mL/min/1.73M2 (02-13-20 @ 12:00)    LIVER FUNCTIONS - ( 13 Feb 2020 12:00 )  Alb: 4.0 g/dL / Pro: 8.0 g/dL / ALK PHOS: 88 U/L / ALT: 16 U/L / AST: 23 U/L / GGT: x                     INFECTIOUS DISEASES TESTING      RADIOLOGY & ADDITIONAL TESTS:        CARDIOLOGY TESTING  12 Lead ECG:   Ventricular Rate 92 BPM    Atrial Rate 92 BPM    P-R Interval 114 ms    QRS Duration 70 ms    Q-T Interval 368 ms    QTC Calculation(Bezet) 455 ms    P Axis 68 degrees    R Axis 26 degrees    T Axis 31 degrees    Diagnosis Line Normal sinus rhythm  Possible Left atrial enlargement  RSR' or QR pattern in V1 suggests right ventricular conduction delay  Abnormal ECG    Confirmed by Parviz Wesley ([id number]) on 2/5/2020 2:14:52 PM (02-05-20 @ 11:17)      MEDICATIONS  amLODIPine   Tablet 10  atorvastatin 10  benztropine 0.5  darunavir 800  dolutegravir 50  folic acid 1  glimepiride. 4  haloperidol     Tablet 10  influenza   Vaccine 0.5  nicotine -  14 mG/24Hr(s) Patch 1  ondansetron    Tablet 4  propranolol 10  ritonavir Tablet 100  thiamine 100  trimethoprim  160 mG/sulfamethoxazole 800 mG 1      ANTIBIOTICS:  darunavir 800 milliGRAM(s) Oral daily  dolutegravir 50 milliGRAM(s) Oral two times a day  ritonavir Tablet 100 milliGRAM(s) Oral every 24 hours  trimethoprim  160 mG/sulfamethoxazole 800 mG 1 Tablet(s) Oral daily

## 2020-02-14 NOTE — PROGRESS NOTE ADULT - PROBLEM SELECTOR PLAN 1
still elevasted jayy get reanl eval still elevasted jayy get reanl eval  eelvated potssium  \ check ekg for changes  repsat [potssium levael if cotn to elevated jayy need acute medical tx

## 2020-02-14 NOTE — CONSULT NOTE ADULT - PROBLEM SELECTOR RECOMMENDATION 9
chronic  poor compliance  NEEDS CARE COORDINATION - PLS CONTACT - TRACI PIERCE - CARE PROVIDER PRIOR TO DC TO COORDINATE CARE     Continue HIV meds  recall if needed
bp stable on meds as reviweed  agrree with geno hairoe

## 2020-02-14 NOTE — CONSULT NOTE ADULT - SUBJECTIVE AND OBJECTIVE BOX
NEPHROLOGY CONSULTATION NOTE    Patient is a 64y Female whom presented to the hospital with psychosis, abusing cocaine and alcohol as per notes.  Pt with PMH of HIV (stopped taking meds), DM - on Metformin, HTN. Seen for hyponatremia, hypercalcemia, CKD.  Creat was 2.1-1.7 mg%. Drinking plenty of water, cachetic poor po solute intake.    PAST MEDICAL & SURGICAL HISTORY:  High cholesterol  Diabetes  HTN (hypertension)  HIV (human immunodeficiency virus infection)    Allergies:  No Known Allergies    Home Medications Reviewed  Hospital Medications:   MEDICATIONS  (STANDING):  amLODIPine   Tablet 10 milliGRAM(s) Oral daily  atorvastatin 10 milliGRAM(s) Oral at bedtime  benztropine 0.5 milliGRAM(s) Oral two times a day  darunavir 800 milliGRAM(s) Oral daily  dolutegravir 50 milliGRAM(s) Oral two times a day  folic acid 1 milliGRAM(s) Oral daily  glimepiride. 4 milliGRAM(s) Oral with breakfast  haloperidol     Tablet 10 milliGRAM(s) Oral at bedtime  influenza   Vaccine 0.5 milliLiter(s) IntraMuscular once  nicotine -  14 mG/24Hr(s) Patch 1 patch Transdermal daily  ondansetron    Tablet 4 milliGRAM(s) Oral once  propranolol 10 milliGRAM(s) Oral two times a day  ritonavir Tablet 100 milliGRAM(s) Oral every 24 hours  thiamine 100 milliGRAM(s) Oral daily  trimethoprim  160 mG/sulfamethoxazole 800 mG 1 Tablet(s) Oral daily      SOCIAL HISTORY:  Denies ETOH,Smoking,   FAMILY HISTORY:        REVIEW OF SYSTEMS:  CONSTITUTIONAL: No weakness, fevers or chills  RESPIRATORY: No cough, wheezing, hemoptysis; No shortness of breath  CARDIOVASCULAR: No chest pain or palpitations.  GASTROINTESTINAL: No abdominal or epigastric pain. No nausea, vomiting, No diarrhea or constipation. No melena or hematochezia.  GENITOURINARY: No dysuria, frequency  VASCULAR: No bilateral lower extremity edema.   All other review of systems is negative unless indicated above.    VITALS:  T(F): 97.9 (02-14-20 @ 08:22), Max: 98.8 (02-13-20 @ 16:30)  HR: 96 (02-14-20 @ 09:13)  BP: 119/69 (02-14-20 @ 09:13)  RR: 18 (02-14-20 @ 09:13)  SpO2: --        I&O's Detail        PHYSICAL EXAM:  Constitutional: NAD  HEENT: anicteric sclera, oropharynx clear, MMM  Neck: No JVD  Respiratory: CTAB, no wheezes, rales or rhonchi  Cardiovascular: S1, S2, RRR  Gastrointestinal: BS+, soft, NT/ND  Extremities: No peripheral edema  Neurological: A/O x 3  : No CVA tenderness. No sosa.   Skin: No rashes  Vascular Access:    LABS:  02-14    126<L>  |  92<L>  |  22<H>  ----------------------------<  293<H>  4.8   |  22  |  1.6<H>  SODIUM TREND:  Sodium 126 [02-14 @ 08:59]  Sodium 129 [02-13 @ 12:00]  Sodium 129 [02-12 @ 10:06]  Sodium 132 [02-11 @ 12:39]  Sodium 140 [02-07 @ 07:10]  Sodium 141 [02-06 @ 08:12]  Sodium 141 [02-05 @ 08:10]    Ca    10.0      14 Feb 2020 08:59    TPro  7.0  /  Alb  3.6  /  TBili  0.3  /  DBili      /  AST  20  /  ALT  13  /  AlkPhos  82  02-14    Creatinine Trend: 1.6 <--, 1.9 <--, 2.1 <--, 1.5 <--, 1.1 <--, 1.1 <--, 1.4 <--                        12.4   6.09  )-----------( 245      ( 14 Feb 2020 08:59 )             37.0     Urine Studies:        02-13 @ 11:32  11.1  39  --        RADIOLOGY & ADDITIONAL STUDIES:  < from: Xray Chest 1 View- PORTABLE-Urgent (02.05.20 @ 09:38) >  No radiographic evidence of acute cardiopulmonary disease.    < end of copied text >

## 2020-02-14 NOTE — CONSULT NOTE ADULT - ASSESSMENT
Pt with PAU on CKD 3, DM, HTN, HIV, psychosis, seen for hypercalcemia, hyponatremia, CKD.    Hypercalcemia - improving, likely due to dehydration to some extent  the presence of CKD, requires ruling out myeloma - therefore SPEP, serum immunofixation, free light chains in serum, UPEP, urine immunofixation is needed  -i PTH wnl    PAU on CKD - creat seems to be at baseline 1.6 mg%  UA protein 30, likely due to DM vs HIV  agree to stop metformin  obtain Urine Na, URine OSm urine creat  kidney and bladder sono to r/o obstruction    Hyponatremia - likely due to polydipsia  serum OSm,  TSH cortisol   urine Osm and Urine na  FREE WATER RESTRICT 1 l daily  cont Ensure 1 can bid  encourage solute intake, regular diet is ok    Thank you

## 2020-02-15 LAB
ALBUMIN SERPL ELPH-MCNC: 3.9 G/DL — SIGNIFICANT CHANGE UP (ref 3.5–5.2)
ALP SERPL-CCNC: 97 U/L — SIGNIFICANT CHANGE UP (ref 30–115)
ALT FLD-CCNC: 13 U/L — SIGNIFICANT CHANGE UP (ref 0–41)
ANION GAP SERPL CALC-SCNC: 12 MMOL/L — SIGNIFICANT CHANGE UP (ref 7–14)
AST SERPL-CCNC: 22 U/L — SIGNIFICANT CHANGE UP (ref 0–41)
BASOPHILS # BLD AUTO: 0.02 K/UL — SIGNIFICANT CHANGE UP (ref 0–0.2)
BASOPHILS NFR BLD AUTO: 0.3 % — SIGNIFICANT CHANGE UP (ref 0–1)
BILIRUB SERPL-MCNC: 0.2 MG/DL — SIGNIFICANT CHANGE UP (ref 0.2–1.2)
BUN SERPL-MCNC: 27 MG/DL — HIGH (ref 10–20)
CALCIUM SERPL-MCNC: 10.5 MG/DL — HIGH (ref 8.5–10.1)
CHLORIDE SERPL-SCNC: 92 MMOL/L — LOW (ref 98–110)
CO2 SERPL-SCNC: 23 MMOL/L — SIGNIFICANT CHANGE UP (ref 17–32)
CREAT SERPL-MCNC: 1.6 MG/DL — HIGH (ref 0.7–1.5)
EOSINOPHIL # BLD AUTO: 0.03 K/UL — SIGNIFICANT CHANGE UP (ref 0–0.7)
EOSINOPHIL NFR BLD AUTO: 0.5 % — SIGNIFICANT CHANGE UP (ref 0–8)
GLUCOSE BLDC GLUCOMTR-MCNC: 125 MG/DL — HIGH (ref 70–99)
GLUCOSE BLDC GLUCOMTR-MCNC: 219 MG/DL — HIGH (ref 70–99)
GLUCOSE BLDC GLUCOMTR-MCNC: 88 MG/DL — SIGNIFICANT CHANGE UP (ref 70–99)
GLUCOSE SERPL-MCNC: 139 MG/DL — HIGH (ref 70–99)
HCT VFR BLD CALC: 40.7 % — SIGNIFICANT CHANGE UP (ref 37–47)
HGB BLD-MCNC: 13.6 G/DL — SIGNIFICANT CHANGE UP (ref 12–16)
IMM GRANULOCYTES NFR BLD AUTO: 0.6 % — HIGH (ref 0.1–0.3)
LYMPHOCYTES # BLD AUTO: 2.27 K/UL — SIGNIFICANT CHANGE UP (ref 1.2–3.4)
LYMPHOCYTES # BLD AUTO: 35.4 % — SIGNIFICANT CHANGE UP (ref 20.5–51.1)
MCHC RBC-ENTMCNC: 29.3 PG — SIGNIFICANT CHANGE UP (ref 27–31)
MCHC RBC-ENTMCNC: 33.4 G/DL — SIGNIFICANT CHANGE UP (ref 32–37)
MCV RBC AUTO: 87.7 FL — SIGNIFICANT CHANGE UP (ref 81–99)
MONOCYTES # BLD AUTO: 0.71 K/UL — HIGH (ref 0.1–0.6)
MONOCYTES NFR BLD AUTO: 11.1 % — HIGH (ref 1.7–9.3)
NEUTROPHILS # BLD AUTO: 3.35 K/UL — SIGNIFICANT CHANGE UP (ref 1.4–6.5)
NEUTROPHILS NFR BLD AUTO: 52.1 % — SIGNIFICANT CHANGE UP (ref 42.2–75.2)
NRBC # BLD: 0 /100 WBCS — SIGNIFICANT CHANGE UP (ref 0–0)
OSMOLALITY SERPL: 297 MOSMOL/KG — SIGNIFICANT CHANGE UP (ref 280–301)
PLATELET # BLD AUTO: 279 K/UL — SIGNIFICANT CHANGE UP (ref 130–400)
POTASSIUM SERPL-MCNC: 4.7 MMOL/L — SIGNIFICANT CHANGE UP (ref 3.5–5)
POTASSIUM SERPL-SCNC: 4.7 MMOL/L — SIGNIFICANT CHANGE UP (ref 3.5–5)
PROT SERPL-MCNC: 7.5 G/DL — SIGNIFICANT CHANGE UP (ref 6–8)
RBC # BLD: 4.64 M/UL — SIGNIFICANT CHANGE UP (ref 4.2–5.4)
RBC # FLD: 13.5 % — SIGNIFICANT CHANGE UP (ref 11.5–14.5)
SODIUM SERPL-SCNC: 127 MMOL/L — LOW (ref 135–146)
WBC # BLD: 6.42 K/UL — SIGNIFICANT CHANGE UP (ref 4.8–10.8)
WBC # FLD AUTO: 6.42 K/UL — SIGNIFICANT CHANGE UP (ref 4.8–10.8)

## 2020-02-15 RX ADMIN — Medication 1 PATCH: at 07:25

## 2020-02-15 RX ADMIN — HALOPERIDOL DECANOATE 10 MILLIGRAM(S): 100 INJECTION INTRAMUSCULAR at 20:11

## 2020-02-15 RX ADMIN — Medication 1 PATCH: at 18:06

## 2020-02-15 RX ADMIN — DOLUTEGRAVIR SODIUM 50 MILLIGRAM(S): 25 TABLET, FILM COATED ORAL at 20:11

## 2020-02-15 RX ADMIN — Medication 4 MILLIGRAM(S): at 08:36

## 2020-02-15 RX ADMIN — Medication 0.5 MILLIGRAM(S): at 08:36

## 2020-02-15 RX ADMIN — AMLODIPINE BESYLATE 10 MILLIGRAM(S): 2.5 TABLET ORAL at 08:36

## 2020-02-15 RX ADMIN — Medication 1 PATCH: at 11:42

## 2020-02-15 RX ADMIN — Medication 100 MILLIGRAM(S): at 08:36

## 2020-02-15 RX ADMIN — RITONAVIR 100 MILLIGRAM(S): 100 TABLET, FILM COATED ORAL at 20:36

## 2020-02-15 RX ADMIN — DARUNAVIR 800 MILLIGRAM(S): 75 TABLET, FILM COATED ORAL at 08:36

## 2020-02-15 RX ADMIN — Medication 0.5 MILLIGRAM(S): at 20:11

## 2020-02-15 RX ADMIN — Medication 1 PATCH: at 11:41

## 2020-02-15 RX ADMIN — DOLUTEGRAVIR SODIUM 50 MILLIGRAM(S): 25 TABLET, FILM COATED ORAL at 08:36

## 2020-02-15 RX ADMIN — ATORVASTATIN CALCIUM 10 MILLIGRAM(S): 80 TABLET, FILM COATED ORAL at 20:11

## 2020-02-15 RX ADMIN — Medication 1 MILLIGRAM(S): at 08:37

## 2020-02-15 RX ADMIN — Medication 1 TABLET(S): at 08:37

## 2020-02-16 LAB
24R-OH-CALCIDIOL SERPL-MCNC: 27 NG/ML — LOW (ref 30–80)
CALCIUM SERPL-MCNC: 10.3 MG/DL — SIGNIFICANT CHANGE UP (ref 8.4–10.5)
CORTIS AM PEAK SERPL-MCNC: 26.2 UG/DL — HIGH (ref 6–18.4)
GLUCOSE BLDC GLUCOMTR-MCNC: 148 MG/DL — HIGH (ref 70–99)
GLUCOSE BLDC GLUCOMTR-MCNC: 66 MG/DL — LOW (ref 70–99)
PTH-INTACT FLD-MCNC: 56 PG/ML — SIGNIFICANT CHANGE UP (ref 15–65)

## 2020-02-16 RX ADMIN — AMLODIPINE BESYLATE 10 MILLIGRAM(S): 2.5 TABLET ORAL at 08:04

## 2020-02-16 RX ADMIN — HALOPERIDOL DECANOATE 10 MILLIGRAM(S): 100 INJECTION INTRAMUSCULAR at 20:11

## 2020-02-16 RX ADMIN — Medication 50 MILLIGRAM(S): at 20:11

## 2020-02-16 RX ADMIN — Medication 1 TABLET(S): at 08:04

## 2020-02-16 RX ADMIN — Medication 0.5 MILLIGRAM(S): at 20:11

## 2020-02-16 RX ADMIN — Medication 1 MILLIGRAM(S): at 08:04

## 2020-02-16 RX ADMIN — ATORVASTATIN CALCIUM 10 MILLIGRAM(S): 80 TABLET, FILM COATED ORAL at 20:10

## 2020-02-16 RX ADMIN — Medication 1 PATCH: at 10:22

## 2020-02-16 RX ADMIN — Medication 4 MILLIGRAM(S): at 08:04

## 2020-02-16 RX ADMIN — DOLUTEGRAVIR SODIUM 50 MILLIGRAM(S): 25 TABLET, FILM COATED ORAL at 08:04

## 2020-02-16 RX ADMIN — DOLUTEGRAVIR SODIUM 50 MILLIGRAM(S): 25 TABLET, FILM COATED ORAL at 20:10

## 2020-02-16 RX ADMIN — DARUNAVIR 800 MILLIGRAM(S): 75 TABLET, FILM COATED ORAL at 08:04

## 2020-02-16 RX ADMIN — Medication 100 MILLIGRAM(S): at 08:04

## 2020-02-16 RX ADMIN — Medication 0.5 MILLIGRAM(S): at 08:04

## 2020-02-16 RX ADMIN — Medication 1 PATCH: at 08:06

## 2020-02-16 RX ADMIN — Medication 1 PATCH: at 10:23

## 2020-02-17 LAB
GLUCOSE BLDC GLUCOMTR-MCNC: 105 MG/DL — HIGH (ref 70–99)
GLUCOSE BLDC GLUCOMTR-MCNC: 72 MG/DL — SIGNIFICANT CHANGE UP (ref 70–99)
KAPPA LC SER QL IFE: 8.43 MG/DL — HIGH (ref 0.33–1.94)
KAPPA/LAMBDA FREE LIGHT CHAIN RATIO, SERUM: 1.67 RATIO — HIGH (ref 0.26–1.65)
LAMBDA LC SER QL IFE: 5.06 MG/DL — HIGH (ref 0.57–2.63)
VIT D25+D1,25 OH+D1,25 PNL SERPL-MCNC: 41.3 PG/ML — SIGNIFICANT CHANGE UP (ref 19.9–79.3)

## 2020-02-17 RX ORDER — MAGNESIUM HYDROXIDE 400 MG/1
30 TABLET, CHEWABLE ORAL DAILY
Refills: 0 | Status: DISCONTINUED | OUTPATIENT
Start: 2020-02-17 | End: 2020-02-27

## 2020-02-17 RX ORDER — SENNA PLUS 8.6 MG/1
2 TABLET ORAL AT BEDTIME
Refills: 0 | Status: COMPLETED | OUTPATIENT
Start: 2020-02-17 | End: 2020-02-18

## 2020-02-17 RX ADMIN — Medication 4 MILLIGRAM(S): at 08:03

## 2020-02-17 RX ADMIN — MAGNESIUM HYDROXIDE 30 MILLILITER(S): 400 TABLET, CHEWABLE ORAL at 20:49

## 2020-02-17 RX ADMIN — RITONAVIR 100 MILLIGRAM(S): 100 TABLET, FILM COATED ORAL at 00:53

## 2020-02-17 RX ADMIN — DOLUTEGRAVIR SODIUM 50 MILLIGRAM(S): 25 TABLET, FILM COATED ORAL at 08:03

## 2020-02-17 RX ADMIN — DARUNAVIR 800 MILLIGRAM(S): 75 TABLET, FILM COATED ORAL at 08:03

## 2020-02-17 RX ADMIN — DOLUTEGRAVIR SODIUM 50 MILLIGRAM(S): 25 TABLET, FILM COATED ORAL at 20:10

## 2020-02-17 RX ADMIN — RITONAVIR 100 MILLIGRAM(S): 100 TABLET, FILM COATED ORAL at 23:01

## 2020-02-17 RX ADMIN — Medication 1 PATCH: at 08:04

## 2020-02-17 RX ADMIN — SENNA PLUS 2 TABLET(S): 8.6 TABLET ORAL at 20:49

## 2020-02-17 RX ADMIN — ATORVASTATIN CALCIUM 10 MILLIGRAM(S): 80 TABLET, FILM COATED ORAL at 20:10

## 2020-02-17 RX ADMIN — Medication 100 MILLIGRAM(S): at 08:03

## 2020-02-17 RX ADMIN — Medication 0.5 MILLIGRAM(S): at 08:03

## 2020-02-17 RX ADMIN — Medication 1 TABLET(S): at 08:03

## 2020-02-17 RX ADMIN — AMLODIPINE BESYLATE 10 MILLIGRAM(S): 2.5 TABLET ORAL at 08:03

## 2020-02-17 RX ADMIN — Medication 1 PATCH: at 08:03

## 2020-02-17 RX ADMIN — Medication 1 PATCH: at 07:30

## 2020-02-17 RX ADMIN — Medication 0.5 MILLIGRAM(S): at 20:10

## 2020-02-17 RX ADMIN — HALOPERIDOL DECANOATE 10 MILLIGRAM(S): 100 INJECTION INTRAMUSCULAR at 20:10

## 2020-02-17 RX ADMIN — Medication 1 MILLIGRAM(S): at 08:03

## 2020-02-17 NOTE — PROGRESS NOTE ADULT - SUBJECTIVE AND OBJECTIVE BOX
Nephrology progress note    Patient is seen and examined, events over the last 24 h noted .  Drinking water plenty, no complaints  Allergies:  No Known Allergies    Hospital Medications:   MEDICATIONS  (STANDING):  amLODIPine   Tablet 10 milliGRAM(s) Oral daily  atorvastatin 10 milliGRAM(s) Oral at bedtime  benztropine 0.5 milliGRAM(s) Oral two times a day  darunavir 800 milliGRAM(s) Oral daily  dolutegravir 50 milliGRAM(s) Oral two times a day  folic acid 1 milliGRAM(s) Oral daily  glimepiride. 4 milliGRAM(s) Oral with breakfast  haloperidol     Tablet 10 milliGRAM(s) Oral at bedtime  influenza   Vaccine 0.5 milliLiter(s) IntraMuscular once  nicotine -  14 mG/24Hr(s) Patch 1 patch Transdermal daily  ondansetron    Tablet 4 milliGRAM(s) Oral once  propranolol 10 milliGRAM(s) Oral two times a day  ritonavir Tablet 100 milliGRAM(s) Oral every 24 hours  thiamine 100 milliGRAM(s) Oral daily  trimethoprim  160 mG/sulfamethoxazole 800 mG 1 Tablet(s) Oral daily        VITALS:  T(F): 98 (02-17-20 @ 06:34), Max: 99.5 (02-16-20 @ 15:43)  HR: 75 (02-17-20 @ 06:34)  BP: 135/64 (02-17-20 @ 06:34)  RR: 18 (02-17-20 @ 06:34)  SpO2: --  Wt(kg): --        PHYSICAL EXAM:  Constitutional: NAD cahectic  HEENT: anicteric sclera, oropharynx clear, MMM  Neck: No JVD  Respiratory: CTAB, no wheezes, rales or rhonchi  Cardiovascular: S1, S2, RRR  Gastrointestinal: BS+, soft, NT/ND  Extremities: No peripheral edema  Neurological: Awake alert  : No CVA tenderness. No sosa.   Skin: No rashes  Vascular Access:    LABS:  02-15    127<L>  |  92<L>  |  27<H>  ----------------------------<  139<H>  4.7   |  23  |  1.6<H>    Ca    10.5<H>      15 Feb 2020 11:58    TPro  7.5  /  Alb  3.9  /  TBili  0.2  /  DBili      /  AST  22  /  ALT  13  /  AlkPhos  97  02-15                          13.6   6.42  )-----------( 279      ( 15 Feb 2020 11:58 )             40.7       Urine Studies:    Sodium, Random Urine: 37.0 mmoL/L (02-14 @ 13:51)  Osmolality, Random Urine: 276 mos/kg (02-14 @ 13:51)  Creatinine, Random Urine: 53 mg/dL (02-14 @ 13:51)    RADIOLOGY & ADDITIONAL STUDIES:  < from: Xray Chest 1 View- PORTABLE-Urgent (02.05.20 @ 09:38) >  No radiographic evidence of acute cardiopulmonary disease.    < end of copied text >

## 2020-02-17 NOTE — PROGRESS NOTE ADULT - ASSESSMENT
Pt with PAU on CKD 3, DM, HTN, HIV, psychosis, seen for hypercalcemia, hyponatremia, CKD.    Hypercalcemia - improving, likely due to dehydration to some extent  the presence of CKD, requires ruling out myeloma - therefore SPEP, serum immunofixation, free light chains in serum, UPEP, urine immunofixation is needed  -i PTH wnl    PAU on CKD - creat seems to be at baseline 1.6 mg%  UA protein 30, likely due to DM vs HIV  off metformin, on Glimepiride now  kidney and bladder sono to r/o obstruction - not done    Hyponatremia isoosmolar - likely due to polydipsia +/- monoclonal protein present?  serum OSm normal TSH cortisol wnl  URine OSm 276 Urine Na 40 urine creat    FREE WATER RESTRICT 1 l daily  f/u SPEP/UPEP and IF    cont Ensure 1 can bid  encourage solute intake, regular diet is ok    Hypercalcemia normal iPTH, 25 VD lwo normal, 1m25 VD normal  PTHrp pending  CXR ok  Hypercalcemia - myeloma or solid tumor possible  Thank you

## 2020-02-18 LAB
% ALBUMIN: 48.4 % — SIGNIFICANT CHANGE UP
% ALPHA 1: 5.5 % — SIGNIFICANT CHANGE UP
% ALPHA 2: 10.9 % — SIGNIFICANT CHANGE UP
% BETA: 10.6 % — SIGNIFICANT CHANGE UP
% GAMMA: 24.6 % — SIGNIFICANT CHANGE UP
% M SPIKE: SIGNIFICANT CHANGE UP
ALBUMIN SERPL ELPH-MCNC: 3.6 G/DL — SIGNIFICANT CHANGE UP (ref 3.6–5.5)
ALBUMIN/GLOB SERPL ELPH: 0.9 RATIO — SIGNIFICANT CHANGE UP
ALPHA1 GLOB SERPL ELPH-MCNC: 0.4 G/DL — SIGNIFICANT CHANGE UP (ref 0.1–0.4)
ALPHA2 GLOB SERPL ELPH-MCNC: 0.8 G/DL — SIGNIFICANT CHANGE UP (ref 0.5–1)
B-GLOBULIN SERPL ELPH-MCNC: 0.8 G/DL — SIGNIFICANT CHANGE UP (ref 0.5–1)
GAMMA GLOBULIN: 1.8 G/DL — HIGH (ref 0.6–1.6)
GLUCOSE BLDC GLUCOMTR-MCNC: 111 MG/DL — HIGH (ref 70–99)
GLUCOSE BLDC GLUCOMTR-MCNC: 80 MG/DL — SIGNIFICANT CHANGE UP (ref 70–99)
INTERPRETATION SERPL IFE-IMP: SIGNIFICANT CHANGE UP
M-SPIKE: SIGNIFICANT CHANGE UP (ref 0–0)
PROT PATTERN SERPL ELPH-IMP: SIGNIFICANT CHANGE UP
PROT SERPL-MCNC: 7.5 G/DL — SIGNIFICANT CHANGE UP (ref 6–8.3)

## 2020-02-18 PROCEDURE — 99231 SBSQ HOSP IP/OBS SF/LOW 25: CPT

## 2020-02-18 PROCEDURE — 76770 US EXAM ABDO BACK WALL COMP: CPT | Mod: 26

## 2020-02-18 RX ADMIN — Medication 100 MILLIGRAM(S): at 08:13

## 2020-02-18 RX ADMIN — DARUNAVIR 800 MILLIGRAM(S): 75 TABLET, FILM COATED ORAL at 08:13

## 2020-02-18 RX ADMIN — RITONAVIR 100 MILLIGRAM(S): 100 TABLET, FILM COATED ORAL at 21:11

## 2020-02-18 RX ADMIN — Medication 0.5 MILLIGRAM(S): at 08:14

## 2020-02-18 RX ADMIN — Medication 4 MILLIGRAM(S): at 08:14

## 2020-02-18 RX ADMIN — Medication 1 PATCH: at 08:14

## 2020-02-18 RX ADMIN — Medication 1 TABLET(S): at 08:13

## 2020-02-18 RX ADMIN — ATORVASTATIN CALCIUM 10 MILLIGRAM(S): 80 TABLET, FILM COATED ORAL at 21:10

## 2020-02-18 RX ADMIN — DOLUTEGRAVIR SODIUM 50 MILLIGRAM(S): 25 TABLET, FILM COATED ORAL at 21:10

## 2020-02-18 RX ADMIN — Medication 1 MILLIGRAM(S): at 08:12

## 2020-02-18 RX ADMIN — SENNA PLUS 2 TABLET(S): 8.6 TABLET ORAL at 21:09

## 2020-02-18 RX ADMIN — DOLUTEGRAVIR SODIUM 50 MILLIGRAM(S): 25 TABLET, FILM COATED ORAL at 08:13

## 2020-02-18 RX ADMIN — AMLODIPINE BESYLATE 10 MILLIGRAM(S): 2.5 TABLET ORAL at 08:13

## 2020-02-18 RX ADMIN — HALOPERIDOL DECANOATE 10 MILLIGRAM(S): 100 INJECTION INTRAMUSCULAR at 21:10

## 2020-02-18 RX ADMIN — Medication 0.5 MILLIGRAM(S): at 21:10

## 2020-02-18 NOTE — PROGRESS NOTE ADULT - PROBLEM SELECTOR PLAN 3
renal eval and f/u appreciated and w./u in progress  possibel myeloma  jayy f/u with lars cho get onclogy jayjay

## 2020-02-18 NOTE — CHART NOTE - NSCHARTNOTEFT_GEN_A_CORE
The treatment team met with pt. to discuss treatment plan, medications and discharge plan.  Pt. presents with no psychiatric symptoms on this date.  She was alert and oriented on all spheres. Pt did present as lethargic and mostly kept her eyes closed during the interview.  She is undergoing multiple medical tests for HIV and electrolyte statuses.  The concerns at this time are primarily medical.  Writer met in person with pt.'s [facility] , Lilibeth.  A progress update was given.  Kallie confirmed pt. has HIV services in the community and attends their food pantry at [facility]. She reports pt. rarely follows up with mental health services.    Writer met with pt. to discuss discharge planning.  As per pt.'s daughter, pt. will be living in her daughter's home upon discharge.  [facility]    Mental Status Exam:    Mood-  euthymic    Sleep-  Normal    Appetite-  Normal    Thought Process-Linear    ADL's-  Fair    Observation-  q 10 minutes    Pt. is not yet stable for discharge at this time.

## 2020-02-18 NOTE — PROGRESS NOTE ADULT - SUBJECTIVE AND OBJECTIVE BOX
pt stable alert in NAD  no new complaints    DEPRESSION  ^DEPRESSION  Handoff  High cholesterol  Diabetes  HTN (hypertension)  HIV (human immunodeficiency virus infection)  Polysubstance abuse  Symptomatic HIV infection  Cocaine use disorder, severe, in sustained remission, in controlled environment  Hyponatremia  Hypercalcemia  CKD (chronic kidney disease) stage 3, GFR 30-59 ml/min  HIV infection, unspecified symptom status  Alcohol use disorder, severe, in controlled environment  HIV infection, unspecified symptom status  Cocaine use disorder  Disorganized schizophrenia  Psychosis, unspecified psychosis type  Type 2 diabetes mellitus without complication, without long-term current use of insulin  HIV (human immunodeficiency virus infection)  HTN (hypertension)  Diabetes  Psychosis    HEALTH ISSUES - PROBLEM Dx:  Polysubstance abuse: Polysubstance abuse  Symptomatic HIV infection: Symptomatic HIV infection  Cocaine use disorder, severe, in sustained remission, in controlled environment  Hyponatremia: Hyponatremia  Hypercalcemia: Hypercalcemia  CKD (chronic kidney disease) stage 3, GFR 30-59 ml/min: CKD (chronic kidney disease) stage 3, GFR 30-59 ml/min  HIV infection, unspecified symptom status  Alcohol use disorder, severe, in controlled environment  HIV infection, unspecified symptom status  Cocaine use disorder  Disorganized schizophrenia  Psychosis, unspecified psychosis type: Psychosis, unspecified psychosis type  Type 2 diabetes mellitus without complication, without long-term current use of insulin: Type 2 diabetes mellitus without complication, without long-term current use of insulin  HIV (human immunodeficiency virus infection): HIV (human immunodeficiency virus infection)  HTN (hypertension): HTN (hypertension)  Diabetes: Diabetes  Psychosis: Psychosis        PAST MEDICAL & SURGICAL HISTORY:  High cholesterol  Diabetes  HTN (hypertension)  HIV (human immunodeficiency virus infection)    No Known Allergies      FAMILY HISTORY:      aluminum hydroxide/magnesium hydroxide/simethicone Suspension 30 milliLiter(s) Oral every 4 hours PRN  amLODIPine   Tablet 10 milliGRAM(s) Oral daily  atorvastatin 10 milliGRAM(s) Oral at bedtime  benztropine 0.5 milliGRAM(s) Oral two times a day  darunavir 800 milliGRAM(s) Oral daily  dolutegravir 50 milliGRAM(s) Oral two times a day  folic acid 1 milliGRAM(s) Oral daily  glimepiride. 4 milliGRAM(s) Oral with breakfast  guaifenesin/dextromethorphan  Syrup 10 milliLiter(s) Oral every 6 hours PRN  haloperidol     Tablet 10 milliGRAM(s) Oral at bedtime  hydrOXYzine hydrochloride 50 milliGRAM(s) Oral every 6 hours PRN  influenza   Vaccine 0.5 milliLiter(s) IntraMuscular once  magnesium hydroxide Suspension 30 milliLiter(s) Oral daily PRN  nicotine -  14 mG/24Hr(s) Patch 1 patch Transdermal daily  ondansetron    Tablet 4 milliGRAM(s) Oral once  propranolol 10 milliGRAM(s) Oral two times a day  ritonavir Tablet 100 milliGRAM(s) Oral every 24 hours  senna 2 Tablet(s) Oral at bedtime  thiamine 100 milliGRAM(s) Oral daily  trimethoprim  160 mG/sulfamethoxazole 800 mG 1 Tablet(s) Oral daily      T(C): 36 (02-18-20 @ 10:00), Max: 36.9 (02-18-20 @ 05:43)  HR: 88 (02-18-20 @ 10:00) (76 - 88)  BP: 91/70 (02-18-20 @ 10:00) (91/70 - 151/74)  RR: 16 (02-18-20 @ 10:00) (16 - 18)  SpO2: --    PE;  general:  no cahnges note d innad    Lungs:    Heart:    EXT:    Neuro:  no deficits                          CAPILLARY BLOOD GLUCOSE      POCT Blood Glucose.: 80 mg/dL (18 Feb 2020 06:23)  POCT Blood Glucose.: 105 mg/dL (17 Feb 2020 16:21)

## 2020-02-19 DIAGNOSIS — F29 UNSPECIFIED PSYCHOSIS NOT DUE TO A SUBSTANCE OR KNOWN PHYSIOLOGICAL CONDITION: ICD-10-CM

## 2020-02-19 DIAGNOSIS — B20 HUMAN IMMUNODEFICIENCY VIRUS [HIV] DISEASE: ICD-10-CM

## 2020-02-19 PROCEDURE — 99222 1ST HOSP IP/OBS MODERATE 55: CPT

## 2020-02-19 PROCEDURE — 99231 SBSQ HOSP IP/OBS SF/LOW 25: CPT

## 2020-02-19 RX ORDER — ONDANSETRON 8 MG/1
4 TABLET, FILM COATED ORAL ONCE
Refills: 0 | Status: COMPLETED | OUTPATIENT
Start: 2020-02-19 | End: 2020-02-19

## 2020-02-19 RX ORDER — MAGNESIUM HYDROXIDE 400 MG/1
30 TABLET, CHEWABLE ORAL ONCE
Refills: 0 | Status: DISCONTINUED | OUTPATIENT
Start: 2020-02-19 | End: 2020-02-27

## 2020-02-19 RX ADMIN — Medication 1 MILLIGRAM(S): at 07:48

## 2020-02-19 RX ADMIN — ONDANSETRON 4 MILLIGRAM(S): 8 TABLET, FILM COATED ORAL at 23:51

## 2020-02-19 RX ADMIN — DOLUTEGRAVIR SODIUM 50 MILLIGRAM(S): 25 TABLET, FILM COATED ORAL at 07:48

## 2020-02-19 RX ADMIN — RITONAVIR 100 MILLIGRAM(S): 100 TABLET, FILM COATED ORAL at 20:57

## 2020-02-19 RX ADMIN — Medication 1 PATCH: at 07:54

## 2020-02-19 RX ADMIN — Medication 0.5 MILLIGRAM(S): at 20:57

## 2020-02-19 RX ADMIN — Medication 4 MILLIGRAM(S): at 07:54

## 2020-02-19 RX ADMIN — Medication 0.5 MILLIGRAM(S): at 07:53

## 2020-02-19 RX ADMIN — ATORVASTATIN CALCIUM 10 MILLIGRAM(S): 80 TABLET, FILM COATED ORAL at 20:57

## 2020-02-19 RX ADMIN — DOLUTEGRAVIR SODIUM 50 MILLIGRAM(S): 25 TABLET, FILM COATED ORAL at 20:57

## 2020-02-19 RX ADMIN — Medication 1 TABLET(S): at 07:48

## 2020-02-19 RX ADMIN — Medication 1 PATCH: at 08:28

## 2020-02-19 RX ADMIN — AMLODIPINE BESYLATE 10 MILLIGRAM(S): 2.5 TABLET ORAL at 07:54

## 2020-02-19 RX ADMIN — MAGNESIUM HYDROXIDE 30 MILLILITER(S): 400 TABLET, CHEWABLE ORAL at 20:31

## 2020-02-19 RX ADMIN — Medication 100 MILLIGRAM(S): at 07:47

## 2020-02-19 RX ADMIN — HALOPERIDOL DECANOATE 10 MILLIGRAM(S): 100 INJECTION INTRAMUSCULAR at 20:57

## 2020-02-19 RX ADMIN — DARUNAVIR 800 MILLIGRAM(S): 75 TABLET, FILM COATED ORAL at 07:53

## 2020-02-19 NOTE — CONSULT NOTE ADULT - ASSESSMENT
64 year old female with multiple medical and psychiatric disorders. Monoclonal gammopathy (igG kappa and lambda) , too small to quantitate , normal free light chain ratio ( for renal insufficiency ) . Probable MGUS , doubt myeloma or other plasma cell disorders . Minimal paraproteinemia does not account for hyponatremia ( psychogenic polydipsia ? ) , also PTH would be suppressed in myeloma . Furthermore calcium and Na were both normal on admission .   Suggest : will check urine immunofixation , Ig quantitation . LDH , no indication for bone marrow or bone  imaging except for possibly CT scan to rule out malignancy in light of marked cachexia and weight loss . Chest xray negative , last CT abdomen from 2018 .

## 2020-02-19 NOTE — CONSULT NOTE ADULT - SUBJECTIVE AND OBJECTIVE BOX
Patient is a 64y old  Female who presents with a chief complaint of psychosis (18 Feb 2020 12:24)      HPI:  Per Psych notes: Patient's daughter Mario Carrasco ( [phone]), reports that patient has become more confused , paranoid , disorganised and hearing voices for about a year since she stopped taking her psychiatric medication. She reports that patient became romantically involved with someone who introduced her to drugs and alcohol and patient stopped taking care of herself and was not even taking her HIV medication. Patient's daughter states " That woman you see there is not my mother when she takes her psychiatric medication". She reports that she was going to take her to [facility] ER for her to be admitted to the inpatient psychiatric floor for medication adjustment after she is discharged from the medical floor. patient's daughter reports that patient has never been diagnosed with dementia and her current behavior is because she was not taking her medication.    Hem/Onc : Pt was initially admitted to medicine for mental status changes (07 Feb 2020 21:37) , we are consulted to rule out myeloma . PMH is significant for above psychiatric disorder in addition to HIV , hypertension , diabetes , CKD , apparently stopped her meds prior to present admission . she was very cooperative and calm during  exam today . 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 ) . Elevated  globulins with 2 paraproteins , too small to quantitate , normal free kappa to lambda . She denies bone pains , fever , night sweats . she reports weight loss of 20 lbs , no cough or SOB , unsure of date of last colonoscopy or mammogram .            PAST MEDICAL & SURGICAL HISTORY:  High cholesterol  Diabetes  HTN (hypertension)  HIV (human immunodeficiency virus infection)      SOCIAL HISTORY:    FAMILY HISTORY:      MEDICATIONS  (STANDING):  amLODIPine   Tablet 10 milliGRAM(s) Oral daily  atorvastatin 10 milliGRAM(s) Oral at bedtime  benztropine 0.5 milliGRAM(s) Oral two times a day  darunavir 800 milliGRAM(s) Oral daily  dolutegravir 50 milliGRAM(s) Oral two times a day  folic acid 1 milliGRAM(s) Oral daily  glimepiride. 4 milliGRAM(s) Oral with breakfast  haloperidol     Tablet 10 milliGRAM(s) Oral at bedtime  influenza   Vaccine 0.5 milliLiter(s) IntraMuscular once  nicotine -  14 mG/24Hr(s) Patch 1 patch Transdermal daily  ondansetron    Tablet 4 milliGRAM(s) Oral once  propranolol 10 milliGRAM(s) Oral two times a day  ritonavir Tablet 100 milliGRAM(s) Oral every 24 hours  thiamine 100 milliGRAM(s) Oral daily  trimethoprim  160 mG/sulfamethoxazole 800 mG 1 Tablet(s) Oral daily    MEDICATIONS  (PRN):  aluminum hydroxide/magnesium hydroxide/simethicone Suspension 30 milliLiter(s) Oral every 4 hours PRN Dyspepsia  guaifenesin/dextromethorphan  Syrup 10 milliLiter(s) Oral every 6 hours PRN cough  hydrOXYzine hydrochloride 50 milliGRAM(s) Oral every 6 hours PRN anxiety  magnesium hydroxide Suspension 30 milliLiter(s) Oral daily PRN Constipation      Allergies    No Known Allergies    Intolerances        Vital Signs Last 24 Hrs  T(C): 36.4 (19 Feb 2020 08:42), Max: 36.4 (19 Feb 2020 08:42)  T(F): 97.5 (19 Feb 2020 08:42), Max: 97.5 (19 Feb 2020 08:42)  HR: 65 (19 Feb 2020 08:42) (65 - 78)  BP: 112/65 (19 Feb 2020 08:42) (112/65 - 127/79)  BP(mean): --  RR: 18 (19 Feb 2020 08:42) (16 - 18)  SpO2: --    PHYSICAL EXAM  General: adult in NAD  HEENT: clear oropharynx, anicteric sclera, pink conjunctiva  Neck: supple  CV: normal S1/S2 with no murmur rubs or gallops  Lungs: positive air movement b/l ant lungs,clear to auscultation, no wheezes, no rales  Abdomen: soft non-tender non-distended, no hepatosplenomegaly  Ext: no clubbing cyanosis or edema  Skin: no rashes and no petechiae  Neuro: alert and oriented X 4, no focal deficits      LABS:                                Serum Protein Electrophoresis Interp: Two Gamma-Migrating Paraproteins Identified (02-16 @ 11:00)  Immunofixation, Serum:   IgG Lambda Band Identified and  IgG Kappa Band Identified    Reference Range: None Detected (02-16 @ 11:00)  LEAH Kappa: 8.43 mg/dL (02-16 @ 11:00)  LEAH Lambda: 5.06 mg/dL (02-16 @ 11:00)          BLOOD SMEAR INTERPRETATION:       RADIOLOGY & ADDITIONAL STUDIES: Patient is a 64y old  Female who presents with a chief complaint of psychosis (18 Feb 2020 12:24)      HPI:  Per Psych notes: Patient's daughter Mario Carrasco ( [phone]), reports that patient has become more confused , paranoid , disorganised and hearing voices for about a year since she stopped taking her psychiatric medication. She reports that patient became romantically involved with someone who introduced her to drugs and alcohol and patient stopped taking care of herself and was not even taking her HIV medication. Patient's daughter states " That woman you see there is not my mother when she takes her psychiatric medication". She reports that she was going to take her to [facility] ER for her to be admitted to the inpatient psychiatric floor for medication adjustment after she is discharged from the medical floor. patient's daughter reports that patient has never been diagnosed with dementia and her current behavior is because she was not taking her medication.    Hem/Onc : Pt was initially admitted to medicine for mental status changes (05 Feb 2020 ) , we are consulted to rule out myeloma . PMH is significant for above psychiatric disorder in addition to HIV , hypertension , diabetes , mild CKD ( baseline Cr :1.0 ) , apparently stopped her meds prior to present admission . she was very cooperative and calm during  exam today . 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 PAU ( creatinine up to 2.0 , improved with hydration ? ) Elevated  globulins with 2 paraproteins , too small to quantitate ( igG kappa and lambda ), normal free kappa to lambda ratio, CBC is normal . She denies bone pains , fever , night sweats . she reports weight loss of 20 lbs , no cough or SOB , unsure of date of last colonoscopy or mammogram .            PAST MEDICAL & SURGICAL HISTORY:  High cholesterol  Diabetes  HTN (hypertension)  HIV (human immunodeficiency virus infection)          MEDICATIONS  (STANDING):  amLODIPine   Tablet 10 milliGRAM(s) Oral daily  atorvastatin 10 milliGRAM(s) Oral at bedtime  benztropine 0.5 milliGRAM(s) Oral two times a day  darunavir 800 milliGRAM(s) Oral daily  dolutegravir 50 milliGRAM(s) Oral two times a day  folic acid 1 milliGRAM(s) Oral daily  glimepiride. 4 milliGRAM(s) Oral with breakfast  haloperidol     Tablet 10 milliGRAM(s) Oral at bedtime  influenza   Vaccine 0.5 milliLiter(s) IntraMuscular once  nicotine -  14 mG/24Hr(s) Patch 1 patch Transdermal daily  ondansetron    Tablet 4 milliGRAM(s) Oral once  propranolol 10 milliGRAM(s) Oral two times a day  ritonavir Tablet 100 milliGRAM(s) Oral every 24 hours  thiamine 100 milliGRAM(s) Oral daily  trimethoprim  160 mG/sulfamethoxazole 800 mG 1 Tablet(s) Oral daily    MEDICATIONS  (PRN):  aluminum hydroxide/magnesium hydroxide/simethicone Suspension 30 milliLiter(s) Oral every 4 hours PRN Dyspepsia  guaifenesin/dextromethorphan  Syrup 10 milliLiter(s) Oral every 6 hours PRN cough  hydrOXYzine hydrochloride 50 milliGRAM(s) Oral every 6 hours PRN anxiety  magnesium hydroxide Suspension 30 milliLiter(s) Oral daily PRN Constipation      Allergies    No Known Allergies    Intolerances        Vital Signs Last 24 Hrs  T(C): 36.4 (19 Feb 2020 08:42), Max: 36.4 (19 Feb 2020 08:42)  T(F): 97.5 (19 Feb 2020 08:42), Max: 97.5 (19 Feb 2020 08:42)  HR: 65 (19 Feb 2020 08:42) (65 - 78)  BP: 112/65 (19 Feb 2020 08:42) (112/65 - 127/79)  BP(mean): --  RR: 18 (19 Feb 2020 08:42) (16 - 18)  SpO2: --    PHYSICAL EXAM  General: chronically ill , cachectic in no acute distress .   HEENT: clear oropharynx, anicteric sclera, pink conjunctiva  Neck: supple   breasts : no abnormal masses , skin retraction .   Lungs: positive air movement b/l ant lungs,clear to auscultation, no wheezes, no rales  Abdomen: soft non-tender non-distended, no hepatosplenomegaly  Ext: no clubbing cyanosis or edema  Skin: no rashes and no petechiae  Neuro: , no focal deficits                                    Serum Protein Electrophoresis Interp: Two Gamma-Migrating Paraproteins Identified (02-16 @ 11:00)  Immunofixation, Serum:   IgG Lambda Band Identified and  IgG Kappa Band Identified    Reference Range: None Detected (02-16 @ 11:00)  LEAH Kappa: 8.43 mg/dL (02-16 @ 11:00)  LEAH Lambda: 5.06 mg/dL (02-16 @ 11:00)

## 2020-02-20 DIAGNOSIS — F29 UNSPECIFIED PSYCHOSIS NOT DUE TO A SUBSTANCE OR KNOWN PHYSIOLOGICAL CONDITION: ICD-10-CM

## 2020-02-20 LAB
ANION GAP SERPL CALC-SCNC: 11 MMOL/L — SIGNIFICANT CHANGE UP (ref 7–14)
ANION GAP SERPL CALC-SCNC: 17 MMOL/L — HIGH (ref 7–14)
BUN SERPL-MCNC: 18 MG/DL — SIGNIFICANT CHANGE UP (ref 10–20)
BUN SERPL-MCNC: 22 MG/DL — HIGH (ref 10–20)
CALCIUM SERPL-MCNC: 10 MG/DL — SIGNIFICANT CHANGE UP (ref 8.5–10.1)
CALCIUM SERPL-MCNC: 10.1 MG/DL — SIGNIFICANT CHANGE UP (ref 8.5–10.1)
CHLORIDE SERPL-SCNC: 91 MMOL/L — LOW (ref 98–110)
CHLORIDE SERPL-SCNC: 98 MMOL/L — SIGNIFICANT CHANGE UP (ref 98–110)
CO2 SERPL-SCNC: 21 MMOL/L — SIGNIFICANT CHANGE UP (ref 17–32)
CO2 SERPL-SCNC: 22 MMOL/L — SIGNIFICANT CHANGE UP (ref 17–32)
CREAT SERPL-MCNC: 1.3 MG/DL — SIGNIFICANT CHANGE UP (ref 0.7–1.5)
CREAT SERPL-MCNC: 1.4 MG/DL — SIGNIFICANT CHANGE UP (ref 0.7–1.5)
GLUCOSE BLDC GLUCOMTR-MCNC: 144 MG/DL — HIGH (ref 70–99)
GLUCOSE BLDC GLUCOMTR-MCNC: 92 MG/DL — SIGNIFICANT CHANGE UP (ref 70–99)
GLUCOSE SERPL-MCNC: 175 MG/DL — HIGH (ref 70–99)
GLUCOSE SERPL-MCNC: 99 MG/DL — SIGNIFICANT CHANGE UP (ref 70–99)
HCT VFR BLD CALC: 39.5 % — SIGNIFICANT CHANGE UP (ref 37–47)
HGB BLD-MCNC: 13 G/DL — SIGNIFICANT CHANGE UP (ref 12–16)
LDH SERPL L TO P-CCNC: 424 — HIGH (ref 50–242)
MCHC RBC-ENTMCNC: 29.1 PG — SIGNIFICANT CHANGE UP (ref 27–31)
MCHC RBC-ENTMCNC: 32.9 G/DL — SIGNIFICANT CHANGE UP (ref 32–37)
MCV RBC AUTO: 88.6 FL — SIGNIFICANT CHANGE UP (ref 81–99)
NRBC # BLD: 0 /100 WBCS — SIGNIFICANT CHANGE UP (ref 0–0)
PLATELET # BLD AUTO: 293 K/UL — SIGNIFICANT CHANGE UP (ref 130–400)
POTASSIUM SERPL-MCNC: 5 MMOL/L — SIGNIFICANT CHANGE UP (ref 3.5–5)
POTASSIUM SERPL-MCNC: 5.5 MMOL/L — HIGH (ref 3.5–5)
POTASSIUM SERPL-SCNC: 5 MMOL/L — SIGNIFICANT CHANGE UP (ref 3.5–5)
POTASSIUM SERPL-SCNC: 5.5 MMOL/L — HIGH (ref 3.5–5)
RBC # BLD: 4.46 M/UL — SIGNIFICANT CHANGE UP (ref 4.2–5.4)
RBC # FLD: 13.2 % — SIGNIFICANT CHANGE UP (ref 11.5–14.5)
SODIUM SERPL-SCNC: 129 MMOL/L — LOW (ref 135–146)
SODIUM SERPL-SCNC: 131 MMOL/L — LOW (ref 135–146)
WBC # BLD: 7.67 K/UL — SIGNIFICANT CHANGE UP (ref 4.8–10.8)
WBC # FLD AUTO: 7.67 K/UL — SIGNIFICANT CHANGE UP (ref 4.8–10.8)

## 2020-02-20 PROCEDURE — 99231 SBSQ HOSP IP/OBS SF/LOW 25: CPT

## 2020-02-20 RX ADMIN — Medication 1 TABLET(S): at 08:47

## 2020-02-20 RX ADMIN — Medication 0.5 MILLIGRAM(S): at 22:13

## 2020-02-20 RX ADMIN — RITONAVIR 100 MILLIGRAM(S): 100 TABLET, FILM COATED ORAL at 22:12

## 2020-02-20 RX ADMIN — MAGNESIUM HYDROXIDE 30 MILLILITER(S): 400 TABLET, CHEWABLE ORAL at 16:08

## 2020-02-20 RX ADMIN — Medication 1 MILLIGRAM(S): at 08:48

## 2020-02-20 RX ADMIN — Medication 4 MILLIGRAM(S): at 08:48

## 2020-02-20 RX ADMIN — Medication 0.5 MILLIGRAM(S): at 08:48

## 2020-02-20 RX ADMIN — HALOPERIDOL DECANOATE 10 MILLIGRAM(S): 100 INJECTION INTRAMUSCULAR at 22:12

## 2020-02-20 RX ADMIN — ATORVASTATIN CALCIUM 10 MILLIGRAM(S): 80 TABLET, FILM COATED ORAL at 22:11

## 2020-02-20 RX ADMIN — DOLUTEGRAVIR SODIUM 50 MILLIGRAM(S): 25 TABLET, FILM COATED ORAL at 22:12

## 2020-02-20 RX ADMIN — Medication 100 MILLIGRAM(S): at 08:47

## 2020-02-20 RX ADMIN — DOLUTEGRAVIR SODIUM 50 MILLIGRAM(S): 25 TABLET, FILM COATED ORAL at 08:47

## 2020-02-20 RX ADMIN — DARUNAVIR 800 MILLIGRAM(S): 75 TABLET, FILM COATED ORAL at 08:47

## 2020-02-20 NOTE — PROGRESS NOTE ADULT - PROBLEM SELECTOR PLAN 2
continue present treatment as per psych plan as reviewed  Medically stable with no new changes in treatment  will continue to monitor medical status while being treated on psych  heme joan ntoted

## 2020-02-20 NOTE — SWALLOW BEDSIDE ASSESSMENT ADULT - SLP PERTINENT HISTORY OF CURRENT PROBLEM
Pt adm 2/7 after becoming increasingly confused, paranoid, and disorganized. She stopped taking her psych meds 1 year ago.

## 2020-02-20 NOTE — SWALLOW BEDSIDE ASSESSMENT ADULT - SWALLOW EVAL: DIAGNOSIS
Pt tolerated puree and mech soft ground consistencies and thin liquids via cup sip. Pt stated her preference was for "baby food" meaning puree. Rec puree and thin as per Pt preference. Recommend GI consult as Pt reports regurgitating her food for past few days.

## 2020-02-20 NOTE — SWALLOW BEDSIDE ASSESSMENT ADULT - ORAL PREPARATORY PHASE
Admission Reconciliation is Completed  Discharge Reconciliation is Not Complete Within functional limits Admission Reconciliation is Completed  Discharge Reconciliation is Completed

## 2020-02-20 NOTE — CHART NOTE - NSCHARTNOTEFT_GEN_A_CORE
The treatment team met with pt. to discuss treatment plan, medications and discharge plan.  There do not appear to be any psychiatrically related issues at this time.  Pt.'s concerns at this time are primarily medical.  She reportedly vomited overnight and has been feeling nauseous.  Pt. is receiving various medical testing.  She denies any suicidal or homicidal ideation or any A/V hallucinations.  She continues to remain isolative to her room which is likely due to her medical issues. Support continues to be given.    Once stable for discharge, pt. will live with her daughter. Pt. will continue to receive support in the community from [facility].  She will be referred to [facility] for continued mental health treatment.    Mental Status Exam    Mood-  euthymic    Sleep-  Normal    Appetite-  Fair    ADL's-  Fair/Poor    Thought Process-  No abnormalities    Observation-  q 10 minutes    Pt. is not yet stable for discharge at this time.

## 2020-02-20 NOTE — PROGRESS NOTE ADULT - SUBJECTIVE AND OBJECTIVE BOX
NEPHROLOGY F/U    Patients labs reviewed  No recent blood work  SPEP revealed 2 monoclonal proteins, no M spike  UPEP not done  Hyponatremia  Hypercalcemia    A/P: 1) MGUS - will need hem/onc eval as OP  obtain UPEP, urine immunofixation  2) CKD 3 creat is baseline  3) Hyponatremia - polydipsia   strict 1 L fluid intake  4) Hypercalcemia - unclear etiology normal 25 and 1,25 vit D - rules out  granulomatous disease or hyperparathyroidism  needs PTH-rp to r/o malignancy related hypercalcemia ( age - appropriate w/u for malignancy mammograms, CXR etc)    Repeat BMP

## 2020-02-20 NOTE — PROGRESS NOTE ADULT - SUBJECTIVE AND OBJECTIVE BOX
pt stable alert in NAD  no new complaints    DEPRESSION  ^DEPRESSION  Handoff  High cholesterol  Diabetes  HTN (hypertension)  HIV (human immunodeficiency virus infection)  HIV infection, unspecified symptom status  Psychosis, unspecified psychosis type  Polysubstance abuse  Symptomatic HIV infection  Cocaine use disorder, severe, in sustained remission, in controlled environment  Hyponatremia  Hypercalcemia  CKD (chronic kidney disease) stage 3, GFR 30-59 ml/min  HIV infection, unspecified symptom status  Alcohol use disorder, severe, in controlled environment  HIV infection, unspecified symptom status  Cocaine use disorder  Disorganized schizophrenia  Psychosis, unspecified psychosis type  Type 2 diabetes mellitus without complication, without long-term current use of insulin  HIV (human immunodeficiency virus infection)  HTN (hypertension)  Diabetes  Psychosis    HEALTH ISSUES - PROBLEM Dx:  HIV infection, unspecified symptom status  Psychosis, unspecified psychosis type  Polysubstance abuse: Polysubstance abuse  Symptomatic HIV infection: Symptomatic HIV infection  Cocaine use disorder, severe, in sustained remission, in controlled environment  Hyponatremia: Hyponatremia  Hypercalcemia: Hypercalcemia  CKD (chronic kidney disease) stage 3, GFR 30-59 ml/min: CKD (chronic kidney disease) stage 3, GFR 30-59 ml/min  HIV infection, unspecified symptom status  Alcohol use disorder, severe, in controlled environment  HIV infection, unspecified symptom status  Cocaine use disorder  Disorganized schizophrenia  Psychosis, unspecified psychosis type: Psychosis, unspecified psychosis type  Type 2 diabetes mellitus without complication, without long-term current use of insulin: Type 2 diabetes mellitus without complication, without long-term current use of insulin  HIV (human immunodeficiency virus infection): HIV (human immunodeficiency virus infection)  HTN (hypertension): HTN (hypertension)  Diabetes: Diabetes  Psychosis: Psychosis        PAST MEDICAL & SURGICAL HISTORY:  High cholesterol  Diabetes  HTN (hypertension)  HIV (human immunodeficiency virus infection)    No Known Allergies      FAMILY HISTORY:      aluminum hydroxide/magnesium hydroxide/simethicone Suspension 30 milliLiter(s) Oral every 4 hours PRN  amLODIPine   Tablet 10 milliGRAM(s) Oral daily  atorvastatin 10 milliGRAM(s) Oral at bedtime  benztropine 0.5 milliGRAM(s) Oral two times a day  darunavir 800 milliGRAM(s) Oral daily  dolutegravir 50 milliGRAM(s) Oral two times a day  folic acid 1 milliGRAM(s) Oral daily  glimepiride. 4 milliGRAM(s) Oral with breakfast  guaifenesin/dextromethorphan  Syrup 10 milliLiter(s) Oral every 6 hours PRN  haloperidol     Tablet 10 milliGRAM(s) Oral at bedtime  hydrOXYzine hydrochloride 50 milliGRAM(s) Oral every 6 hours PRN  influenza   Vaccine 0.5 milliLiter(s) IntraMuscular once  magnesium hydroxide Suspension 30 milliLiter(s) Oral daily PRN  magnesium hydroxide Suspension 30 milliLiter(s) Oral once PRN  nicotine -  14 mG/24Hr(s) Patch 1 patch Transdermal daily  ondansetron    Tablet 4 milliGRAM(s) Oral once  propranolol 10 milliGRAM(s) Oral two times a day  ritonavir Tablet 100 milliGRAM(s) Oral every 24 hours  thiamine 100 milliGRAM(s) Oral daily  trimethoprim  160 mG/sulfamethoxazole 800 mG 1 Tablet(s) Oral daily      T(C): 36.9 (02-20-20 @ 08:25), Max: 36.9 (02-20-20 @ 08:25)  HR: 108 (02-20-20 @ 08:25) (65 - 108)  BP: 106/55 (02-20-20 @ 08:25) (106/55 - 125/85)  RR: 16 (02-20-20 @ 08:25) (16 - 18)  SpO2: --    PE;  general:  no cahnge snoted in nad    Lungs:    Heart:    EXT:    Neuro:  no deficits      13.0  39.5  7.67  --  --  --  --                                        13.0   7.67  )-----------( 293      ( 20 Feb 2020 08:00 )             39.5       CAPILLARY BLOOD GLUCOSE      POCT Blood Glucose.: 144 mg/dL (20 Feb 2020 06:35)

## 2020-02-20 NOTE — CHART NOTE - NSCHARTNOTEFT_GEN_A_CORE
Received a call by psychiatry to review labs for the pt   K 5.5  hemolyzed, will repeat K tomorrow in am   pt seen by nephrology , Bun/Cr improving 27/1.6 > 18/1.3  Na improving 127> 131  LDH elevated 424     Plan   BMP for tomorrow to recheck K   send immunofixation urine   immunoglobulin panel - in process   Hem/onc following the pt , follow up with hem/onc once all labs that they requested are available Received a call by psychiatry to review labs for the pt   K 5.5  hemolyzed, will repeat K this afternoon  pt seen by nephrology , Bun/Cr improving 27/1.6 > 18/1.3  Na improving 127> 131  LDH elevated 424     Plan   BMP for 3pm to recheck K   send immunofixation urine   immunoglobulin panel - in process   Hem/onc following the pt , follow up with hem/onc once all labs that they requested are available

## 2020-02-21 LAB
GLUCOSE BLDC GLUCOMTR-MCNC: 76 MG/DL — SIGNIFICANT CHANGE UP (ref 70–99)
IGA FLD-MCNC: 306 MG/DL — SIGNIFICANT CHANGE UP (ref 84–499)
IGG FLD-MCNC: 2014 MG/DL — HIGH (ref 610–1660)
IGM SERPL-MCNC: 81 MG/DL — SIGNIFICANT CHANGE UP (ref 35–242)
KAPPA LC SER QL IFE: 8.15 MG/DL — HIGH (ref 0.33–1.94)
KAPPA/LAMBDA FREE LIGHT CHAIN RATIO, SERUM: 1.64 RATIO — SIGNIFICANT CHANGE UP (ref 0.26–1.65)
LAMBDA LC SER QL IFE: 4.98 MG/DL — HIGH (ref 0.57–2.63)
PROT ?TM UR-MCNC: 44 MG/DL — HIGH (ref 0–12)

## 2020-02-21 PROCEDURE — 99223 1ST HOSP IP/OBS HIGH 75: CPT

## 2020-02-21 PROCEDURE — 99231 SBSQ HOSP IP/OBS SF/LOW 25: CPT

## 2020-02-21 RX ORDER — POLYETHYLENE GLYCOL 3350 17 G/17G
17 POWDER, FOR SOLUTION ORAL DAILY
Refills: 0 | Status: DISCONTINUED | OUTPATIENT
Start: 2020-02-21 | End: 2020-02-27

## 2020-02-21 RX ADMIN — Medication 1 MILLIGRAM(S): at 09:59

## 2020-02-21 RX ADMIN — DOLUTEGRAVIR SODIUM 50 MILLIGRAM(S): 25 TABLET, FILM COATED ORAL at 09:59

## 2020-02-21 RX ADMIN — Medication 0.5 MILLIGRAM(S): at 21:21

## 2020-02-21 RX ADMIN — Medication 100 MILLIGRAM(S): at 10:00

## 2020-02-21 RX ADMIN — HALOPERIDOL DECANOATE 10 MILLIGRAM(S): 100 INJECTION INTRAMUSCULAR at 21:21

## 2020-02-21 RX ADMIN — DARUNAVIR 800 MILLIGRAM(S): 75 TABLET, FILM COATED ORAL at 09:59

## 2020-02-21 RX ADMIN — Medication 4 MILLIGRAM(S): at 09:59

## 2020-02-21 RX ADMIN — AMLODIPINE BESYLATE 10 MILLIGRAM(S): 2.5 TABLET ORAL at 09:58

## 2020-02-21 RX ADMIN — Medication 1 PATCH: at 10:00

## 2020-02-21 RX ADMIN — Medication 0.5 MILLIGRAM(S): at 09:59

## 2020-02-21 RX ADMIN — DOLUTEGRAVIR SODIUM 50 MILLIGRAM(S): 25 TABLET, FILM COATED ORAL at 21:21

## 2020-02-21 RX ADMIN — RITONAVIR 100 MILLIGRAM(S): 100 TABLET, FILM COATED ORAL at 21:24

## 2020-02-21 RX ADMIN — ATORVASTATIN CALCIUM 10 MILLIGRAM(S): 80 TABLET, FILM COATED ORAL at 21:21

## 2020-02-21 RX ADMIN — POLYETHYLENE GLYCOL 3350 17 GRAM(S): 17 POWDER, FOR SOLUTION ORAL at 12:16

## 2020-02-21 RX ADMIN — Medication 1 TABLET(S): at 10:00

## 2020-02-21 NOTE — CONSULT NOTE ADULT - SUBJECTIVE AND OBJECTIVE BOX
Chief complaint/Reason for consult: intermittent vomiting     HPI:  Per Psych notes: Patient's daughter Mario Carrasco ( [phone]), reports that patient has become more confused , paranoid , disorganised and hearing voices for about a year since she stopped taking her psychiatric medication. She reports that patient became romantically involved with someone who introduced her to drugs and alcohol and patient stopped taking care of herself and was not even taking her HIV medication. Patient's daughter states " That woman you see there is not my mother when she takes her psychiatric medication". She reports that she was going to take her to [facility] ER for her to be admitted to the inpatient psychiatric floor for medication adjustment after she is discharged from the medical floor. patient's daughter reports that patient has never been diagnosed with dementia and her current behavior is because she was not taking her medication.    Pt was initially admitted to medicine for mental status changes (07 Feb 2020 21:37)    GI Updates: 64yFemale [facility] HIV, HTN, DM consulted to GI after restarting HIV medications noticed intermittent vomiting. Patient reports last episode of vomiting was two days ago. currently Patient currently denies nausea, vomiting, hematemesis, melena, blood in stool, diarrhea, abdominal pain. Patient comfortable. +patient  notes constipation.      PAST MEDICAL & SURGICAL HISTORY:   High cholesterol  Diabetes  HTN (hypertension)  HIV (human immunodeficiency virus infection)        Family history:  FAMILY HISTORY:    No GI cancers in first or second degree relatives    Social History:+ polysubstance use    Allergies:     No Known Allergies        MEDICATIONS  (STANDING):  amLODIPine   Tablet 10 milliGRAM(s) Oral daily  atorvastatin 10 milliGRAM(s) Oral at bedtime  benztropine 0.5 milliGRAM(s) Oral two times a day  darunavir 800 milliGRAM(s) Oral daily  dolutegravir 50 milliGRAM(s) Oral two times a day  folic acid 1 milliGRAM(s) Oral daily  glimepiride. 4 milliGRAM(s) Oral with breakfast  haloperidol     Tablet 10 milliGRAM(s) Oral at bedtime  influenza   Vaccine 0.5 milliLiter(s) IntraMuscular once  nicotine -  14 mG/24Hr(s) Patch 1 patch Transdermal daily  ondansetron    Tablet 4 milliGRAM(s) Oral once  propranolol 10 milliGRAM(s) Oral two times a day  ritonavir Tablet 100 milliGRAM(s) Oral every 24 hours  thiamine 100 milliGRAM(s) Oral daily  trimethoprim  160 mG/sulfamethoxazole 800 mG 1 Tablet(s) Oral daily    MEDICATIONS  (PRN):  aluminum hydroxide/magnesium hydroxide/simethicone Suspension 30 milliLiter(s) Oral every 4 hours PRN Dyspepsia  guaifenesin/dextromethorphan  Syrup 10 milliLiter(s) Oral every 6 hours PRN cough  hydrOXYzine hydrochloride 50 milliGRAM(s) Oral every 6 hours PRN anxiety  magnesium hydroxide Suspension 30 milliLiter(s) Oral daily PRN Constipation  magnesium hydroxide Suspension 30 milliLiter(s) Oral once PRN Constipation        REVIEW OF SYSTEMS  General:  No fevers, or chills.  Eyes:  No reported pain or visual changes  ENT:  No sore throat or runny nose.  NECK: No stiffness or lymphadenopathy  CV:  No chest pain or palpitations.  Resp:  No shortness of breath, cough, wheezing or hemoptysis  GI:  No abdominal pain, nausea, vomiting, dysphagia, diarrhea or constipation. No rectal bleeding, melena, or hematemesis.  Neuro:  No tingling, numbness       VITALS:   T(F): 98.4 (02-21-20 @ 08:11), Max: 98.4 (02-21-20 @ 05:35)  HR: 94 (02-21-20 @ 08:11) (80 - 94)  BP: 113/69 (02-21-20 @ 08:11) (110/63 - 121/72)  RR: 16 (02-21-20 @ 08:11) (16 - 16)  SpO2: --    PHYSICAL EXAM:  GENERAL: no acute distress.  HEAD:  Atraumatic, Normocephalic  EYES: conjunctiva and sclera clear  NECK: Supple, No thyromegaly   CHEST/LUNG: Clear to auscultation bilaterally; No wheeze, rhonchi, or rales  HEART: Regular rate and rhythm; normal S1, S2, No murmurs.  ABDOMEN: Soft, nontender, nondistended; Bowel sounds present, no abdominal bruit, masses, or hepatosplenomegaly  NEUROLOGY: No asterixis or tremor  SKIN: Intact, no jaundice          LABS:  02-20    129<L>  |  91<L>  |  22<H>  ----------------------------<  99  5.0   |  21  |  1.4    Ca    10.1      20 Feb 2020 15:49                            13.0   7.67  )-----------( 293      ( 20 Feb 2020 08:00 )             39.5           IMAGING:      < from: Xray Chest 1 View- PORTABLE-Urgent (02.05.20 @ 09:38) >  EXAM:  XR CHEST PORTABLE URGENT 1V            PROCEDURE DATE:  02/05/2020            INTERPRETATION:  Clinical History / Reason for exam: Altered mental status    Comparison : Chest radiograph 6/20/2018.    Technique/Positioning: Frontal, portable.    Findings:    Support devices: None.    Cardiac/mediastinum/hilum: Unremarkable.    Lung parenchyma/Pleura: Within normal limits.    Skeleton/soft tissues: Degenerative change    Impression:      No radiographic evidence of acute cardiopulmonary disease.                      KATHY COLEMAN M.D., ATTENDING RADIOLOGIST  This document has been electronically signed. Feb 5 2020  9:52AM        < end of copied text >

## 2020-02-21 NOTE — CONSULT NOTE ADULT - ASSESSMENT
64[facility]Female [facility] HIV, HTN, DM consulted to GI after restarting HIV medications noticed intermittent vomiting. Patient reports last episode of vomiting was two days ago    Problem 1-Intermittent vomiting likely due to restarting HIV medication   Rec  -Patient notes she no longer feels nauseous, last episode of emesis two days ago  -Continue diet as per speech and swallow    problem 2-Constipation  Rec  -Miralax 17g daily 64yFemale [facility] HIV, HTN, DM consulted to GI after restarting HIV medications noticed intermittent vomiting. Patient reports last episode of vomiting was two days ago    Problem 1-Intermittent vomiting likely due to restarting HIV medication   Rec  -Patient notes she no longer feels nauseous, last episode of emesis two days ago  -Continue diet as per speech and swallow  -If vomiting recurs and patient can not eat can recall to consider endoscopy if benefits outweigh risks     problem 2-Constipation  Rec  -Miralax 17g daily     Recall GI if needed

## 2020-02-21 NOTE — CONSULT NOTE ADULT - ATTENDING COMMENTS
64yFemale [facility] HIV, HTN, DM consulted to GI after restarting HIV medications noticed intermittent vomiting. Patient reports last episode of vomiting was two days ago  Symptoms have abated.  No intervention at present.  See note above

## 2020-02-22 LAB
GLUCOSE BLDC GLUCOMTR-MCNC: 73 MG/DL — SIGNIFICANT CHANGE UP (ref 70–99)
INTERPRETATION 24H UR IFE-IMP: SIGNIFICANT CHANGE UP
INTERPRETATION 24H UR IFE-IMP: SIGNIFICANT CHANGE UP

## 2020-02-22 RX ORDER — ASPIRIN/CALCIUM CARB/MAGNESIUM 324 MG
81 TABLET ORAL DAILY
Refills: 0 | Status: DISCONTINUED | OUTPATIENT
Start: 2020-02-22 | End: 2020-02-22

## 2020-02-22 RX ADMIN — Medication 1 PATCH: at 20:02

## 2020-02-22 RX ADMIN — Medication 0.5 MILLIGRAM(S): at 09:03

## 2020-02-22 RX ADMIN — RITONAVIR 100 MILLIGRAM(S): 100 TABLET, FILM COATED ORAL at 20:26

## 2020-02-22 RX ADMIN — AMLODIPINE BESYLATE 10 MILLIGRAM(S): 2.5 TABLET ORAL at 09:04

## 2020-02-22 RX ADMIN — Medication 1 PATCH: at 09:05

## 2020-02-22 RX ADMIN — HALOPERIDOL DECANOATE 10 MILLIGRAM(S): 100 INJECTION INTRAMUSCULAR at 20:26

## 2020-02-22 RX ADMIN — Medication 100 MILLIGRAM(S): at 09:03

## 2020-02-22 RX ADMIN — Medication 0.5 MILLIGRAM(S): at 20:26

## 2020-02-22 RX ADMIN — Medication 1 MILLIGRAM(S): at 09:04

## 2020-02-22 RX ADMIN — MAGNESIUM HYDROXIDE 30 MILLILITER(S): 400 TABLET, CHEWABLE ORAL at 10:41

## 2020-02-22 RX ADMIN — DARUNAVIR 800 MILLIGRAM(S): 75 TABLET, FILM COATED ORAL at 09:04

## 2020-02-22 RX ADMIN — DOLUTEGRAVIR SODIUM 50 MILLIGRAM(S): 25 TABLET, FILM COATED ORAL at 09:04

## 2020-02-22 RX ADMIN — ATORVASTATIN CALCIUM 10 MILLIGRAM(S): 80 TABLET, FILM COATED ORAL at 20:26

## 2020-02-22 RX ADMIN — Medication 1 TABLET(S): at 09:04

## 2020-02-22 RX ADMIN — Medication 4 MILLIGRAM(S): at 09:03

## 2020-02-22 RX ADMIN — DOLUTEGRAVIR SODIUM 50 MILLIGRAM(S): 25 TABLET, FILM COATED ORAL at 20:26

## 2020-02-22 NOTE — PROGRESS NOTE ADULT - SUBJECTIVE AND OBJECTIVE BOX
pt stable alert in NAD  no new complaints    DEPRESSION  ^DEPRESSION  Handoff  High cholesterol  Diabetes  HTN (hypertension)  HIV (human immunodeficiency virus infection)  Psychosis, unspecified psychosis type  HIV infection, unspecified symptom status  Psychosis, unspecified psychosis type  Polysubstance abuse  Symptomatic HIV infection  Cocaine use disorder, severe, in sustained remission, in controlled environment  Hyponatremia  Hypercalcemia  CKD (chronic kidney disease) stage 3, GFR 30-59 ml/min  HIV infection, unspecified symptom status  Alcohol use disorder, severe, in controlled environment  HIV infection, unspecified symptom status  Cocaine use disorder  Disorganized schizophrenia  Psychosis, unspecified psychosis type  Type 2 diabetes mellitus without complication, without long-term current use of insulin  HIV (human immunodeficiency virus infection)  HTN (hypertension)  Diabetes  Psychosis    HEALTH ISSUES - PROBLEM Dx:  Psychosis, unspecified psychosis type  HIV infection, unspecified symptom status  Psychosis, unspecified psychosis type  Polysubstance abuse: Polysubstance abuse  Symptomatic HIV infection: Symptomatic HIV infection  Cocaine use disorder, severe, in sustained remission, in controlled environment  Hyponatremia: Hyponatremia  Hypercalcemia: Hypercalcemia  CKD (chronic kidney disease) stage 3, GFR 30-59 ml/min: CKD (chronic kidney disease) stage 3, GFR 30-59 ml/min  HIV infection, unspecified symptom status  Alcohol use disorder, severe, in controlled environment  HIV infection, unspecified symptom status  Cocaine use disorder  Disorganized schizophrenia  Psychosis, unspecified psychosis type: Psychosis, unspecified psychosis type  Type 2 diabetes mellitus without complication, without long-term current use of insulin: Type 2 diabetes mellitus without complication, without long-term current use of insulin  HIV (human immunodeficiency virus infection): HIV (human immunodeficiency virus infection)  HTN (hypertension): HTN (hypertension)  Diabetes: Diabetes  Psychosis: Psychosis        PAST MEDICAL & SURGICAL HISTORY:  High cholesterol  Diabetes  HTN (hypertension)  HIV (human immunodeficiency virus infection)    No Known Allergies      FAMILY HISTORY:      aluminum hydroxide/magnesium hydroxide/simethicone Suspension 30 milliLiter(s) Oral every 4 hours PRN  amLODIPine   Tablet 10 milliGRAM(s) Oral daily  atorvastatin 10 milliGRAM(s) Oral at bedtime  benztropine 0.5 milliGRAM(s) Oral two times a day  darunavir 800 milliGRAM(s) Oral daily  dolutegravir 50 milliGRAM(s) Oral two times a day  folic acid 1 milliGRAM(s) Oral daily  glimepiride. 4 milliGRAM(s) Oral with breakfast  guaifenesin/dextromethorphan  Syrup 10 milliLiter(s) Oral every 6 hours PRN  haloperidol     Tablet 10 milliGRAM(s) Oral at bedtime  hydrOXYzine hydrochloride 50 milliGRAM(s) Oral every 6 hours PRN  influenza   Vaccine 0.5 milliLiter(s) IntraMuscular once  magnesium hydroxide Suspension 30 milliLiter(s) Oral daily PRN  magnesium hydroxide Suspension 30 milliLiter(s) Oral once PRN  nicotine -  14 mG/24Hr(s) Patch 1 patch Transdermal daily  ondansetron    Tablet 4 milliGRAM(s) Oral once  polyethylene glycol 3350 17 Gram(s) Oral daily PRN  propranolol 10 milliGRAM(s) Oral two times a day  ritonavir Tablet 100 milliGRAM(s) Oral every 24 hours  thiamine 100 milliGRAM(s) Oral daily  trimethoprim  160 mG/sulfamethoxazole 800 mG 1 Tablet(s) Oral daily      T(C): 36.8 (02-22-20 @ 06:00), Max: 36.8 (02-22-20 @ 06:00)  HR: 81 (02-22-20 @ 06:00) (81 - 81)  BP: 112/69 (02-22-20 @ 06:00) (112/69 - 112/69)  RR: 16 (02-22-20 @ 06:00) (16 - 16)  SpO2: --    PE;  general:  no acute cahngs noted    Lungs: clesar    Heart:    EXT: from no edema    Neuro:  aelrt nod eicits      --  --  --  22  1.4  5.0  99  13.0  39.5  7.67  18  1.3  5.5  175      02-20    129<L>  |  91<L>  |  22<H>  ----------------------------<  99  5.0   |  21  |  1.4    Ca    10.1      20 Feb 2020 15:49                CAPILLARY BLOOD GLUCOSE      POCT Blood Glucose.: 73 mg/dL (22 Feb 2020 06:33)

## 2020-02-23 LAB
ANION GAP SERPL CALC-SCNC: 12 MMOL/L — SIGNIFICANT CHANGE UP (ref 7–14)
BUN SERPL-MCNC: 18 MG/DL — SIGNIFICANT CHANGE UP (ref 10–20)
CALCIUM SERPL-MCNC: 9.9 MG/DL — SIGNIFICANT CHANGE UP (ref 8.5–10.1)
CHLORIDE SERPL-SCNC: 95 MMOL/L — LOW (ref 98–110)
CO2 SERPL-SCNC: 24 MMOL/L — SIGNIFICANT CHANGE UP (ref 17–32)
CREAT SERPL-MCNC: 1.3 MG/DL — SIGNIFICANT CHANGE UP (ref 0.7–1.5)
GLUCOSE BLDC GLUCOMTR-MCNC: 73 MG/DL — SIGNIFICANT CHANGE UP (ref 70–99)
GLUCOSE SERPL-MCNC: 152 MG/DL — HIGH (ref 70–99)
POTASSIUM SERPL-MCNC: 4.1 MMOL/L — SIGNIFICANT CHANGE UP (ref 3.5–5)
POTASSIUM SERPL-SCNC: 4.1 MMOL/L — SIGNIFICANT CHANGE UP (ref 3.5–5)
SODIUM SERPL-SCNC: 131 MMOL/L — LOW (ref 135–146)

## 2020-02-23 RX ORDER — ONDANSETRON 8 MG/1
4 TABLET, FILM COATED ORAL EVERY 6 HOURS
Refills: 0 | Status: DISCONTINUED | OUTPATIENT
Start: 2020-02-23 | End: 2020-02-27

## 2020-02-23 RX ADMIN — Medication 1 PATCH: at 09:05

## 2020-02-23 RX ADMIN — DARUNAVIR 800 MILLIGRAM(S): 75 TABLET, FILM COATED ORAL at 09:05

## 2020-02-23 RX ADMIN — AMLODIPINE BESYLATE 10 MILLIGRAM(S): 2.5 TABLET ORAL at 09:05

## 2020-02-23 RX ADMIN — Medication 1 MILLIGRAM(S): at 09:05

## 2020-02-23 RX ADMIN — Medication 1 TABLET(S): at 09:05

## 2020-02-23 RX ADMIN — Medication 0.5 MILLIGRAM(S): at 20:13

## 2020-02-23 RX ADMIN — HALOPERIDOL DECANOATE 10 MILLIGRAM(S): 100 INJECTION INTRAMUSCULAR at 20:13

## 2020-02-23 RX ADMIN — Medication 0.5 MILLIGRAM(S): at 09:05

## 2020-02-23 RX ADMIN — Medication 100 MILLIGRAM(S): at 09:05

## 2020-02-23 RX ADMIN — Medication 4 MILLIGRAM(S): at 09:05

## 2020-02-23 RX ADMIN — RITONAVIR 100 MILLIGRAM(S): 100 TABLET, FILM COATED ORAL at 20:14

## 2020-02-23 RX ADMIN — DOLUTEGRAVIR SODIUM 50 MILLIGRAM(S): 25 TABLET, FILM COATED ORAL at 20:13

## 2020-02-23 RX ADMIN — DOLUTEGRAVIR SODIUM 50 MILLIGRAM(S): 25 TABLET, FILM COATED ORAL at 09:05

## 2020-02-23 RX ADMIN — Medication 1 PATCH: at 09:18

## 2020-02-23 RX ADMIN — Medication 1 PATCH: at 19:51

## 2020-02-23 RX ADMIN — ATORVASTATIN CALCIUM 10 MILLIGRAM(S): 80 TABLET, FILM COATED ORAL at 20:13

## 2020-02-24 LAB
ALBUMIN SERPL ELPH-MCNC: 3.8 G/DL — SIGNIFICANT CHANGE UP (ref 3.5–5.2)
ALP SERPL-CCNC: 100 U/L — SIGNIFICANT CHANGE UP (ref 30–115)
ALT FLD-CCNC: 18 U/L — SIGNIFICANT CHANGE UP (ref 0–41)
ANION GAP SERPL CALC-SCNC: 12 MMOL/L — SIGNIFICANT CHANGE UP (ref 7–14)
AST SERPL-CCNC: 27 U/L — SIGNIFICANT CHANGE UP (ref 0–41)
BILIRUB SERPL-MCNC: 0.3 MG/DL — SIGNIFICANT CHANGE UP (ref 0.2–1.2)
BUN SERPL-MCNC: 15 MG/DL — SIGNIFICANT CHANGE UP (ref 10–20)
CALCIUM SERPL-MCNC: 10.2 MG/DL — HIGH (ref 8.5–10.1)
CHLORIDE SERPL-SCNC: 98 MMOL/L — SIGNIFICANT CHANGE UP (ref 98–110)
CO2 SERPL-SCNC: 24 MMOL/L — SIGNIFICANT CHANGE UP (ref 17–32)
CREAT SERPL-MCNC: 1.4 MG/DL — SIGNIFICANT CHANGE UP (ref 0.7–1.5)
GLUCOSE BLDC GLUCOMTR-MCNC: 151 MG/DL — HIGH (ref 70–99)
GLUCOSE BLDC GLUCOMTR-MCNC: 193 MG/DL — HIGH (ref 70–99)
GLUCOSE BLDC GLUCOMTR-MCNC: 63 MG/DL — LOW (ref 70–99)
GLUCOSE SERPL-MCNC: 95 MG/DL — SIGNIFICANT CHANGE UP (ref 70–99)
HCT VFR BLD CALC: 39.3 % — SIGNIFICANT CHANGE UP (ref 37–47)
HGB BLD-MCNC: 13.1 G/DL — SIGNIFICANT CHANGE UP (ref 12–16)
MCHC RBC-ENTMCNC: 29.3 PG — SIGNIFICANT CHANGE UP (ref 27–31)
MCHC RBC-ENTMCNC: 33.3 G/DL — SIGNIFICANT CHANGE UP (ref 32–37)
MCV RBC AUTO: 87.9 FL — SIGNIFICANT CHANGE UP (ref 81–99)
NRBC # BLD: 0 /100 WBCS — SIGNIFICANT CHANGE UP (ref 0–0)
PLATELET # BLD AUTO: 353 K/UL — SIGNIFICANT CHANGE UP (ref 130–400)
POTASSIUM SERPL-MCNC: 5.2 MMOL/L — HIGH (ref 3.5–5)
POTASSIUM SERPL-SCNC: 5.2 MMOL/L — HIGH (ref 3.5–5)
PROT SERPL-MCNC: 7.5 G/DL — SIGNIFICANT CHANGE UP (ref 6–8)
RBC # BLD: 4.47 M/UL — SIGNIFICANT CHANGE UP (ref 4.2–5.4)
RBC # FLD: 13.2 % — SIGNIFICANT CHANGE UP (ref 11.5–14.5)
SODIUM SERPL-SCNC: 134 MMOL/L — LOW (ref 135–146)
WBC # BLD: 7.93 K/UL — SIGNIFICANT CHANGE UP (ref 4.8–10.8)
WBC # FLD AUTO: 7.93 K/UL — SIGNIFICANT CHANGE UP (ref 4.8–10.8)

## 2020-02-24 PROCEDURE — 99231 SBSQ HOSP IP/OBS SF/LOW 25: CPT

## 2020-02-24 RX ADMIN — ATORVASTATIN CALCIUM 10 MILLIGRAM(S): 80 TABLET, FILM COATED ORAL at 20:54

## 2020-02-24 RX ADMIN — Medication 1 PATCH: at 19:11

## 2020-02-24 RX ADMIN — Medication 1 PATCH: at 07:07

## 2020-02-24 RX ADMIN — Medication 4 MILLIGRAM(S): at 08:02

## 2020-02-24 RX ADMIN — Medication 1 PATCH: at 09:38

## 2020-02-24 RX ADMIN — DARUNAVIR 800 MILLIGRAM(S): 75 TABLET, FILM COATED ORAL at 08:02

## 2020-02-24 RX ADMIN — HALOPERIDOL DECANOATE 10 MILLIGRAM(S): 100 INJECTION INTRAMUSCULAR at 20:54

## 2020-02-24 RX ADMIN — Medication 1 MILLIGRAM(S): at 08:02

## 2020-02-24 RX ADMIN — DOLUTEGRAVIR SODIUM 50 MILLIGRAM(S): 25 TABLET, FILM COATED ORAL at 20:54

## 2020-02-24 RX ADMIN — RITONAVIR 100 MILLIGRAM(S): 100 TABLET, FILM COATED ORAL at 21:12

## 2020-02-24 RX ADMIN — MAGNESIUM HYDROXIDE 30 MILLILITER(S): 400 TABLET, CHEWABLE ORAL at 10:39

## 2020-02-24 RX ADMIN — Medication 0.5 MILLIGRAM(S): at 08:02

## 2020-02-24 RX ADMIN — Medication 0.5 MILLIGRAM(S): at 20:54

## 2020-02-24 RX ADMIN — AMLODIPINE BESYLATE 10 MILLIGRAM(S): 2.5 TABLET ORAL at 08:03

## 2020-02-24 RX ADMIN — Medication 100 MILLIGRAM(S): at 08:02

## 2020-02-24 RX ADMIN — Medication 1 PATCH: at 08:03

## 2020-02-24 RX ADMIN — Medication 1 TABLET(S): at 08:02

## 2020-02-24 RX ADMIN — DOLUTEGRAVIR SODIUM 50 MILLIGRAM(S): 25 TABLET, FILM COATED ORAL at 08:04

## 2020-02-24 NOTE — PROGRESS NOTE BEHAVIORAL HEALTH - NSBHCHARTREVIEWINVESTIGATE_PSY_A_CORE FT
35660 Exp Problem Focused - Mod. Complex
12 Lead ECG (02.14.20 @ 10:18) >  QTC Calculation(Bezet) 445 ms  P Axis 70 degrees  R Axis 32 degrees  T Axis 107 degrees  Diagnosis Line Normal sinus rhythm  Right atrial enlargement  Nonspecific ST and T wave abnormality  Abnormal ECG
12 Lead ECG (02.14.20 @ 10:18) >  QTC Calculation(Bezet) 445 ms  P Axis 70 degrees  R Axis 32 degrees  T Axis 107 degrees  Diagnosis Line Normal sinus rhythm  Right atrial enlargement  Nonspecific ST and T wave abnormality  Abnormal ECG
12 Lead ECG (02.05.20 @ 11:17) >  QTC Calculation(Bezet) 455 ms
12 Lead ECG (02.05.20 @ 11:17) >  QTC Calculation(Bezet) 455 ms
12 Lead ECG (02.14.20 @ 10:18) >  QTC Calculation(Bezet) 445 ms
12 Lead ECG (02.14.20 @ 10:18) >  QTC Calculation(Bezet) 445 ms  P Axis 70 degrees  R Axis 32 degrees  T Axis 107 degrees  Diagnosis Line Normal sinus rhythm  Right atrial enlargement  Nonspecific ST and T wave abnormality  Abnormal ECG
12 Lead ECG (02.05.20 @ 11:17) >  QTC Calculation(Bezet) 455 ms

## 2020-02-24 NOTE — PROGRESS NOTE BEHAVIORAL HEALTH - NSBHCHARTREVIEWLAB_PSY_A_CORE FT
POCT Blood Glucose.: 151: RNNotify RB Clnd mtr mg/dL (02.24.20 @ 07:57)
POCT Blood Glucose.: 255: RNNotify RB Clnd mtr mg/dL (02.13.20 @ 06:47)

## 2020-02-24 NOTE — PROGRESS NOTE ADULT - SUBJECTIVE AND OBJECTIVE BOX
pt stable alert in NAD  no new complaints    DEPRESSION  ^DEPRESSION  Handoff  High cholesterol  Diabetes  HTN (hypertension)  HIV (human immunodeficiency virus infection)  Psychosis, unspecified psychosis type  HIV infection, unspecified symptom status  Psychosis, unspecified psychosis type  Polysubstance abuse  Symptomatic HIV infection  Cocaine use disorder, severe, in sustained remission, in controlled environment  Hyponatremia  Hypercalcemia  CKD (chronic kidney disease) stage 3, GFR 30-59 ml/min  HIV infection, unspecified symptom status  Alcohol use disorder, severe, in controlled environment  HIV infection, unspecified symptom status  Cocaine use disorder  Disorganized schizophrenia  Psychosis, unspecified psychosis type  Type 2 diabetes mellitus without complication, without long-term current use of insulin  HIV (human immunodeficiency virus infection)  HTN (hypertension)  Diabetes  Psychosis    HEALTH ISSUES - PROBLEM Dx:  Psychosis, unspecified psychosis type  HIV infection, unspecified symptom status  Psychosis, unspecified psychosis type  Polysubstance abuse: Polysubstance abuse  Symptomatic HIV infection: Symptomatic HIV infection  Cocaine use disorder, severe, in sustained remission, in controlled environment  Hyponatremia: Hyponatremia  Hypercalcemia: Hypercalcemia  CKD (chronic kidney disease) stage 3, GFR 30-59 ml/min: CKD (chronic kidney disease) stage 3, GFR 30-59 ml/min  HIV infection, unspecified symptom status  Alcohol use disorder, severe, in controlled environment  HIV infection, unspecified symptom status  Cocaine use disorder  Disorganized schizophrenia  Psychosis, unspecified psychosis type: Psychosis, unspecified psychosis type  Type 2 diabetes mellitus without complication, without long-term current use of insulin: Type 2 diabetes mellitus without complication, without long-term current use of insulin  HIV (human immunodeficiency virus infection): HIV (human immunodeficiency virus infection)  HTN (hypertension): HTN (hypertension)  Diabetes: Diabetes  Psychosis: Psychosis        PAST MEDICAL & SURGICAL HISTORY:  High cholesterol  Diabetes  HTN (hypertension)  HIV (human immunodeficiency virus infection)    No Known Allergies      FAMILY HISTORY:      aluminum hydroxide/magnesium hydroxide/simethicone Suspension 30 milliLiter(s) Oral every 4 hours PRN  amLODIPine   Tablet 10 milliGRAM(s) Oral daily  atorvastatin 10 milliGRAM(s) Oral at bedtime  benztropine 0.5 milliGRAM(s) Oral two times a day  darunavir 800 milliGRAM(s) Oral daily  dolutegravir 50 milliGRAM(s) Oral two times a day  folic acid 1 milliGRAM(s) Oral daily  glimepiride. 4 milliGRAM(s) Oral with breakfast  guaifenesin/dextromethorphan  Syrup 10 milliLiter(s) Oral every 6 hours PRN  haloperidol     Tablet 10 milliGRAM(s) Oral at bedtime  hydrOXYzine hydrochloride 50 milliGRAM(s) Oral every 6 hours PRN  influenza   Vaccine 0.5 milliLiter(s) IntraMuscular once  magnesium hydroxide Suspension 30 milliLiter(s) Oral daily PRN  magnesium hydroxide Suspension 30 milliLiter(s) Oral once PRN  nicotine -  14 mG/24Hr(s) Patch 1 patch Transdermal daily  ondansetron    Tablet 4 milliGRAM(s) Oral once  ondansetron    Tablet 4 milliGRAM(s) Oral every 6 hours PRN  polyethylene glycol 3350 17 Gram(s) Oral daily PRN  propranolol 10 milliGRAM(s) Oral two times a day  ritonavir Tablet 100 milliGRAM(s) Oral every 24 hours  thiamine 100 milliGRAM(s) Oral daily  trimethoprim  160 mG/sulfamethoxazole 800 mG 1 Tablet(s) Oral daily      T(C): 37.5 (02-23-20 @ 17:01), Max: 37.5 (02-23-20 @ 17:01)  HR: 75 (02-23-20 @ 17:01) (75 - 94)  BP: 102/57 (02-23-20 @ 17:01) (102/57 - 125/77)  RR: 16 (02-23-20 @ 17:01) (16 - 16)  SpO2: --    PE;  general:  no cahgnses noted in nad    Lungs:    Heart:    EXT:    Neuro:  aelrt nod eficits      13.1  39.3  7.93  --  --  --  --  --  --  --  18  1.3  4.1  152      02-23    131<L>  |  95<L>  |  18  ----------------------------<  152<H>  4.1   |  24  |  1.3    Ca    9.9      23 Feb 2020 07:00                                  13.1   7.93  )-----------( 353      ( 24 Feb 2020 07:41 )             39.3       CAPILLARY BLOOD GLUCOSE      POCT Blood Glucose.: 151 mg/dL (24 Feb 2020 07:57)  POCT Blood Glucose.: 63 mg/dL (24 Feb 2020 07:20)

## 2020-02-24 NOTE — CHART NOTE - NSCHARTNOTEFT_GEN_A_CORE
The treatment team met with pt. to discuss treatment plan, medications and discharge plans.  Pt. is not presenting with any acute psychiatric symptoms.  She is presenting with some disorganization of thought, however, this appears likely related to her medical issues and/or neurocognitive issues.  Pt. has been answering questions with unrelated questions.  She continues to remain isolative to her room.  Pt. denies any suicidal or homicidal ideations or any A/V hallucinations.  She remains compliant with her medications.    Upon discharge, pt. will return home with her daughter in the community.  She will be referred to this hospital's dual focus program.  Pt. will continue services with [facility] and [facility].  Pt. is aware and agreeable to the same.    Mental Status Exam:    Mood-  Euthymic    Sleep-  Normal    Appetite-  Fair    ADL's-  Fair    Thought Process-  Disorganized/Linear    Observation-  q 10 minutes    Pt is not yet ready for discharge on this date.

## 2020-02-25 LAB
GLUCOSE BLDC GLUCOMTR-MCNC: 110 MG/DL — HIGH (ref 70–99)
GLUCOSE BLDC GLUCOMTR-MCNC: 58 MG/DL — LOW (ref 70–99)
GLUCOSE BLDC GLUCOMTR-MCNC: 96 MG/DL — SIGNIFICANT CHANGE UP (ref 70–99)

## 2020-02-25 PROCEDURE — 99231 SBSQ HOSP IP/OBS SF/LOW 25: CPT

## 2020-02-25 RX ADMIN — Medication 0.5 MILLIGRAM(S): at 20:02

## 2020-02-25 RX ADMIN — Medication 1 TABLET(S): at 08:09

## 2020-02-25 RX ADMIN — HALOPERIDOL DECANOATE 10 MILLIGRAM(S): 100 INJECTION INTRAMUSCULAR at 20:02

## 2020-02-25 RX ADMIN — Medication 0.5 MILLIGRAM(S): at 08:08

## 2020-02-25 RX ADMIN — DOLUTEGRAVIR SODIUM 50 MILLIGRAM(S): 25 TABLET, FILM COATED ORAL at 20:01

## 2020-02-25 RX ADMIN — AMLODIPINE BESYLATE 10 MILLIGRAM(S): 2.5 TABLET ORAL at 08:08

## 2020-02-25 RX ADMIN — ATORVASTATIN CALCIUM 10 MILLIGRAM(S): 80 TABLET, FILM COATED ORAL at 20:02

## 2020-02-25 RX ADMIN — Medication 100 MILLIGRAM(S): at 08:08

## 2020-02-25 RX ADMIN — Medication 4 MILLIGRAM(S): at 08:09

## 2020-02-25 RX ADMIN — RITONAVIR 100 MILLIGRAM(S): 100 TABLET, FILM COATED ORAL at 20:04

## 2020-02-25 RX ADMIN — DOLUTEGRAVIR SODIUM 50 MILLIGRAM(S): 25 TABLET, FILM COATED ORAL at 08:10

## 2020-02-25 RX ADMIN — Medication 1 PATCH: at 08:09

## 2020-02-25 RX ADMIN — Medication 1 PATCH: at 19:56

## 2020-02-25 RX ADMIN — Medication 1 PATCH: at 08:22

## 2020-02-25 RX ADMIN — Medication 1 MILLIGRAM(S): at 08:08

## 2020-02-25 RX ADMIN — DARUNAVIR 800 MILLIGRAM(S): 75 TABLET, FILM COATED ORAL at 08:09

## 2020-02-25 RX ADMIN — Medication 1 PATCH: at 07:22

## 2020-02-25 NOTE — PROGRESS NOTE BEHAVIORAL HEALTH - GAIT / STATION
Normal gait / station
Abnormal gait / station
Normal gait / station
Abnormal gait / station
Normal gait / station

## 2020-02-25 NOTE — PROGRESS NOTE BEHAVIORAL HEALTH - AFFECT QUALITY
Euthymic

## 2020-02-25 NOTE — PROGRESS NOTE BEHAVIORAL HEALTH - PRIMARY DX
Psychosis
Psychosis
Psychosis, unspecified psychosis type
Psychosis, unspecified psychosis type
Cocaine use disorder
Disorganized schizophrenia
Cocaine use disorder
Cocaine use disorder
Cocaine use disorder, severe, in sustained remission, in controlled environment
Cocaine use disorder, severe, in sustained remission, in controlled environment
Disorganized schizophrenia

## 2020-02-25 NOTE — PROGRESS NOTE BEHAVIORAL HEALTH - MODIFICATIONS
seen/discussed with resident.  Labs WNL; will continue low dose meds and monitor
seen/discussed with resident.  More organized; no somatic c/o. Will continue meds and proceed with d/c planning
seen/discussed with resident.  No s/h ideation or overt psychosis.  Renal ultrasound noted.  Will f/u with labs.
seen/discussed with resident. Labs reviewed with PA; will reorder. Pt is in no distress; she ate well in our presence, and was not psychotic or suicidal
seen/discussed with resident.  No psychosis or somatic c/o. Will proceed with d/c planning
seen/discussed with resident.  Pt is goal directed; PO intake good.  Continue treatment plan with possible d/c this week
seen/discussed with resident.  more verbal/organized, will continue to monitor medical status
seen/discussed with resident.  Mood is pleasant; no behavior issues or s/h ideation. Will continue meds; as per medical

## 2020-02-25 NOTE — PROGRESS NOTE BEHAVIORAL HEALTH - BODY HABITUS
Average build
Average build
Underweight
Underweight
Underweight/Malnourished
Underweight
Underweight
Malnourished/Underweight
Underweight
Underweight
Well nourished
Underweight
Average build

## 2020-02-25 NOTE — PROGRESS NOTE BEHAVIORAL HEALTH - THOUGHT PROCESS
Disorganized
Linear/Other
Disorganized
Other/Linear
Illogical/Linear/Impaired reasoning/Other
Linear/Other
Other/Linear
Disorganized
Linear/Other
Linear/Other
Other/Linear/Illogical/Impaired reasoning
Linear/Other
Disorganized

## 2020-02-25 NOTE — PROGRESS NOTE BEHAVIORAL HEALTH - AXIS III
HIV, HTN, DM

## 2020-02-25 NOTE — PROGRESS NOTE BEHAVIORAL HEALTH - REMOTE MEMORY
Normal
Impaired
Normal
Normal
Impaired
Normal
Impaired
Normal

## 2020-02-25 NOTE — PROGRESS NOTE BEHAVIORAL HEALTH - NS ED BHA MED ROS CARDIOVASCULAR
Unable to assess
No complaints

## 2020-02-25 NOTE — PROGRESS NOTE BEHAVIORAL HEALTH - NSBHADMITIPOBS_PSY_A_CORE
Routine observation
Routine observation
Constant observation
Enhanced supervision
Routine observation
Routine observation
Enhanced supervision
Enhanced supervision
Routine observation
Routine observation

## 2020-02-25 NOTE — PROGRESS NOTE BEHAVIORAL HEALTH - RISK ASSESSMENT
Risk factors include acute symptoms of psychosis, improving delirium, relapse of cocaine use disorder and alcohol use disorder, untreated HIV infection with poor medical compliance, depressed mood.   Protective factors include social support from daughter, future oriented, insightful to substance use.     Based on above, continues to warrant IPP admission.
Risk factors include relapse of cocaine use disorder and alcohol use disorder, untreated HIV infection with poor medical compliance, depressed mood.   Protective factors include resolved psychotic symptoms, resolved delirium, medical evaluation and current treatment of HIV, social support from daughter, future oriented, insightful to substance use, reengagement with case management services.        Based on above, continues to warrant IPP admission.
Risk factors include acute symptoms of psychosis, improving delirium, relapse of cocaine use disorder and alcohol use disorder, untreated HIV infection with poor medical compliance, depressed mood.   Protective factors include social support from daughter, future oriented, insightful to substance use.   Based on above, continues to warrant IPP admission.
Risk factors include acute symptoms of psychosis, delirium, relapse of cocaine use disorder and alcohol use disorder, untreated HIV infection.   Protective factors include social support from daughter, future oriented, insightful to substance use.     Based on above, continues to warrant IPP admission.

## 2020-02-25 NOTE — PROGRESS NOTE BEHAVIORAL HEALTH - NSBHCHARTREVIEWVS_PSY_A_CORE FT
Vital Signs Last 24 Hrs  T(C): 36.9 (08 Feb 2020 09:00), Max: 36.9 (08 Feb 2020 09:00)  T(F): 98.5 (08 Feb 2020 09:00), Max: 98.5 (08 Feb 2020 09:00)  HR: 120 (08 Feb 2020 09:00) (76 - 120)  BP: 148/97 (08 Feb 2020 09:00) (116/69 - 169/91)  BP(mean): --  RR: 18 (08 Feb 2020 09:00) (16 - 19)  SpO2: --
Vital Signs Last 24 Hrs  T(C): 36.6 (25 Feb 2020 10:00), Max: 37.7 (24 Feb 2020 17:36)  T(F): 97.8 (25 Feb 2020 10:00), Max: 99.8 (24 Feb 2020 17:36)  HR: 101 (25 Feb 2020 10:00) (74 - 101)  BP: 99/74 (25 Feb 2020 10:00) (99/74 - 124/65)  RR: 16 (25 Feb 2020 10:00) (16 - 16)
Vital Signs Last 24 Hrs  T(C): 36.9 (20 Feb 2020 08:25), Max: 36.9 (20 Feb 2020 08:25)  T(F): 98.4 (20 Feb 2020 08:25), Max: 98.4 (20 Feb 2020 08:25)  HR: 108 (20 Feb 2020 08:25) (77 - 108)  BP: 106/55 (20 Feb 2020 08:25) (106/55 - 125/85)  RR: 16 (20 Feb 2020 08:25) (16 - 16)
Vital Signs Last 24 Hrs  T(C): 36.9 (21 Feb 2020 08:11), Max: 36.9 (21 Feb 2020 05:35)  T(F): 98.4 (21 Feb 2020 08:11), Max: 98.4 (21 Feb 2020 05:35)  HR: 94 (21 Feb 2020 08:11) (80 - 94)  BP: 113/69 (21 Feb 2020 08:11) (110/63 - 121/72)  RR: 16 (21 Feb 2020 08:11) (16 - 16)
Vital Signs Last 24 Hrs  T(C): 37.5 (23 Feb 2020 17:01), Max: 37.5 (23 Feb 2020 17:01)  T(F): 99.5 (23 Feb 2020 17:01), Max: 99.5 (23 Feb 2020 17:01)  HR: 75 (23 Feb 2020 17:01) (75 - 75)  BP: 102/57 (23 Feb 2020 17:01) (102/57 - 102/57)  RR: 16 (23 Feb 2020 17:01) (16 - 16)
Vital Signs Last 24 Hrs  T(C): 36 (18 Feb 2020 10:00), Max: 36.9 (18 Feb 2020 05:43)  T(F): 96.8 (18 Feb 2020 10:00), Max: 98.5 (18 Feb 2020 05:43)  HR: 88 (18 Feb 2020 10:00) (76 - 88)  BP: 91/70 (18 Feb 2020 10:00) (91/70 - 151/74)  RR: 16 (18 Feb 2020 10:00) (16 - 18)
Vital Signs Last 24 Hrs  T(C): 36.1 (13 Feb 2020 08:26), Max: 37.1 (13 Feb 2020 05:39)  T(F): 96.9 (13 Feb 2020 08:26), Max: 98.8 (13 Feb 2020 05:39)  HR: 105 (13 Feb 2020 08:26) (99 - 105)  BP: 138/93 (13 Feb 2020 08:26) (120/77 - 138/93)  RR: 16 (13 Feb 2020 08:26) (16 - 16)
Vital Signs Last 24 Hrs  T(C): 36.5 (11 Feb 2020 06:03), Max: 36.5 (11 Feb 2020 06:03)  T(F): 97.7 (11 Feb 2020 06:03), Max: 97.7 (11 Feb 2020 06:03)  HR: 92 (11 Feb 2020 06:03) (92 - 97)  BP: 156/86 (11 Feb 2020 06:03) (135/95 - 156/86)  RR: 16 (11 Feb 2020 06:03) (16 - 20)
Vital Signs Last 24 Hrs  T(C): 36.6 (14 Feb 2020 08:22), Max: 37.1 (13 Feb 2020 16:30)  T(F): 97.9 (14 Feb 2020 08:22), Max: 98.8 (13 Feb 2020 16:30)  HR: 96 (14 Feb 2020 09:13) (86 - 97)  BP: 119/69 (14 Feb 2020 09:13) (103/69 - 119/69)  RR: 18 (14 Feb 2020 09:13) (17 - 18)

## 2020-02-25 NOTE — PROGRESS NOTE BEHAVIORAL HEALTH - NSBHATTESTSEENBY_PSY_A_CORE
attending Psychiatrist without NP/Trainee
Attending Psychiatrist supervising NP/Trainee, meeting pt...

## 2020-02-25 NOTE — PROGRESS NOTE BEHAVIORAL HEALTH - NSBHCONSORIP_PSY_A_CORE
Consult...
Inpatient Admission...

## 2020-02-25 NOTE — PROGRESS NOTE BEHAVIORAL HEALTH - NS ED BHA MSE SPEECH RATE
Refill requested for:     Prozac  Last filled on:     9/6/18    Last office visit:  10/26/18   Pending office visit: None    Medication refilled per protocol.  
Normal

## 2020-02-25 NOTE — PROGRESS NOTE BEHAVIORAL HEALTH - NSBHFUPINTERVALHXFT_PSY_A_CORE
Ms. Carrasco was seen today, alert, oriented, to person, place, year (but not month, stated it was December), and situation, consistent with improving mental status. Labs were reviewed and high potassium, low sodium, elevated creatinine, elevated calcium noted. Discussed with Dr. Epstein whose note suggests workup of hypercalcemia with serum PTH level, PO hydration. Hyponatremia thought to be secondary to elevated blood sugar, will switch from metformin to sulfonylurea due to nonresponse to metformin and decreased renal clearance.    Ms. Carrasco was seen social in day room, endorsed no acute complaints, stated that she had a good visit with her daughter yesterday.   Spoke with daughter, Mario Carrasco ([phone]) - stated that she is "not in the right state", states that they discussed hygiene such as showering daily. States that they are also working on financials so that while patient is in the hospital her boyfriend doesn't take divert her money. States that if rehab is available they discussed and patient is amenable.
Nursing reports Yana vomited overnight. Nursing was informed pending urine collection.   Yana was sleeping this morning, alert, oriented, easily aroused. Required encouragement to eat. States she was nauseous last night and threw up, denies current nausea. Endorses continued constipation, without bowel movement for approximately 1 week, will encourage use of PRN Milk of Magnesia. Denies depressed, elevated, or irritable mood. Denies sensation of paranoia. Denies significant anxiety. Denies auditory hallucinations. Greatly appreciate medical and heme onc evaluation.     On Tuesday, spoke with  via [facility] Annie Camejo, [phone], endorsed that they follow her monthly and were concerned when she was not available for her last appointment. States that she appears thin, weak, and less cognitively appropriate (difficulty answering questions). They note she lost her dentures.
Nursing reports improved mental status over the weekend, with no behavioral events.   Patient is alert, oriented to person, date (month and year), place. She denies mood disturbance endorsing a euthymic mood. Denies auditory hallucinations. Denies significant psychiatric symptoms. Medical team is currently following as is nephrology service, for electrolyte imbalances, HIV, T2DM.     Mario Carrasco ([phone]) - daughter - States that her next visit is planned on Thursday. States that her mothers plan is to live in her apartment in [place], daughter is considering applying to become a home-health aid for her. States that she was seen at [facility], unknown location or program.
Nursing reports no acute behavioral events overnight. Patient today appears as typical, oriented, however off topic responses to questions, not appearing preoccupied internally, but rather neurocognitive effected. Can follow commands and answer simple questions. No auditory hallucinations. Endorses euthymic mood. Reports that her son may visit tonight.     Mario Carrasco ( [phone]) - was informed of discharge plans with tentative plan for discharge on Thursday 2/27/20. Did not have acute concerns for discharge or safety at this time, endorsed plans to care for patient after discharge. Was informed of dual focus intake appointment. Daughter is planning for visit tonight.     , via [facility], Annie Camejo, [phone] - was informed of discharge plan, given daughters contact information for coordination of care.
Nursing reports no behavioral issues with Yana over the weekend.  Yana was seen today, denying significant psychiatric symptoms including mood disturbance, auditory hallucinations, perceptual disturbances. Despite this, she appears to answer questions in a manner in which she is answering a different question than the one asked. For example, when asked how her weekend was she states "I had to strain" (referring to constipation). Regarding this, she endorses bowel movements daily, though does note having to strain. Denies nausea, chest pain, or other "ailments."     Spoke with daughter, Mario Carrasco ( [phone]) - Plans visiting in the evening tomorrow, Tuesday 2/25/20. [place] her mother was in a medically compromised state but was agreeable that psychiatrically (in terms of mood, psychotic sx, manic sx) she was stable, however, did agree that her mental status was compromised from prior baseline.
Nursing reports that Yana threw up 3 times overnight.     Yana was seen by treatment team today including social work, psychiatry, and nursing. By her bed was a bowl of vomitus, with light brown vomitus of low volume. She was lethargic, oriented to person, place, and time. She reported throwing up due to "reflux", however did report a sensation of chills last night. Denied cough, stomach ache, feeling febrile, or having a headache.     In the afternoon reported tolerating lunch well which was confirmed by PCA. PCA reported she was in the day room throughout the day and did not have additional vomiting.     She denied psychotic, manic symptoms. Endorsed depressed mood.       Spoke with daughter, Mario Carrasco	     ( [phone])	   Plans for visitation tonight. States that typically she does have vomiting when she restarts her HIV medications. Endorses that she had a  formerly with [employer], is not sure if that service is currently in place or not. Stated she was going to encourage the patient to go to rehabilitation for substances.
Patient seen and evaluated in her room sitting on bed wearing hospital gown fairly cooperative with 1:1 constant observation staff present in the room.  speech is coherent. normal rate and tone.  Affect blunted.  mood neutral denies feeling depressed.  thinking with loosening of association and disorganized.  denies any suicidal or homicidal ideation at present.  Insight and judgment poor ton impaired. Pt currently responsive to staff's verbal redirection.
Patient was seen and interviewed today. Staff on one to one observation was at bedside.  On approach , patient was observed to be interacting appropriately with the staff at her bedside . She report that she does not know why she is in the hospital.  she is cooperative on approach.   Her thoughts  very disorganised.  complaint with medications , denies s/h ideations intent or plan.  no acute event overnight   She  is unsteady on her feet.  h/o falls
Yana was alert, oriented to person, place, and time. No significant change in neuropsychiatric status at this time. She continues to speak tangentially and responds in a manner distal to the topic presented, however, there is not significant evidence for acute angel. She denies sleep disturbance. Endorses resolved nausea. Endorsed continued constipation (Miralax added per GI recommendations).     , via [facility], [facility], [phone], was provided with daughters contact. Agreed to help coordinate aftercare with daughter and our team.      Spoke with daughter, Mario Carrasco ( [phone]) - was informed that  was provided her information. Next expected visit Tuesday Feb 25 in evening hours, no acute concerns at this time.
Yana was present for team meeting today, meeting with social work, psychiatry, nursing.     She was alert, oriented to person, year, but not immediately place initially stating "surgery, I think my stomach" when asked the place she was in. After redirection stated "the psych jacinto", was oriented to being in [place]. Her orientation was consistent with acute decline cognitively since yesterday, she also appeared less appropriate than prior, smiling inappropriately during conversation, answering questions as if answering a question different than the one asked.     She denied auditory hallucinations, suicidality.   Blood work reviewed, corrected sodium (for hyperglycemia) is 134 (correcting for blood glucose level of 293), her potassium was within normal limits. Medicine following, per Dr. Epstein notes will recheck labs tomorrow.
Patient seen today by treatment team. Continued to appear disoriented and confused, consistent with chart review of prior assessments. Was oriented to person and year. When asked about place stated she required several attempts before stating "the psych jacinto", appeared to have difficulty focussing, often providing irrelevant responses. Gave the impression of a cognitive disorder throughout the assessment.

## 2020-02-25 NOTE — PROGRESS NOTE BEHAVIORAL HEALTH - NSBHFUPMEDSE_PSY_A_CORE
None known
None known
Yes
None known

## 2020-02-25 NOTE — PROGRESS NOTE BEHAVIORAL HEALTH - ESTIMATED INTELLIGENCE
Average
Average
Below Average
Average
Average
Below Average
Average

## 2020-02-25 NOTE — PROGRESS NOTE BEHAVIORAL HEALTH - SUMMARY
Ms Carrasco is a 64 year old woman with a history of Schizophrenia versus Bipolar disorder versus and HIV Infection and Polysubstance Dependence who was admitted to the medical floor for altered mental status. Psychiatry consult was called for medication management of her altered mental status.   Patient appears to have been non complaint with her psychotropic medication for about 1 year. Patient is disorganised in behavior and thought and  has auditory hallucinations. Since patient was admitted to the medical floor, she was re-started on her psychotropic medication and seems to be less disorganised. She also is said to become less akathetic she was started on Propanolol 10mg TID. Given collateral information that patient is less psychotic when she is compliant with her medication and currently can not take care of herself, it seems her current presentation is a decompensation of her psychiatric illness. However  HIV Associated neurocognitive Disorder cannot be completely ruled out.   At this time, patient is considered a danger to herself and needs inpatient psychiatric hospitalization for medication management and symptom stabilization. Her psychotropic medication will be continued as currently prescribed.    Medications that should be continued on the inpatient psychiatry floor:	  atorvastatin 10 mg oral tablet: 1 tab(s) orally once a day (at bedtime)  Cogentin 0.5 mg oral tablet: 0.5 tab(s) orally 2 times a day   dolutegravir 50 mg oral tablet: 1 tab(s) orally 2 times a day   folic acid 1 mg oral tablet: 1 tab(s) orally once a day  Haldol 10 mg oral tablet: 1 tab(s) orally once a day (at bedtime)   metFORMIN 500 mg oral tablet: 1 tab(s) orally 2 times a day  Prezista 800 mg oral tablet: 1 tab(s) orally once a day   propranolol 10 mg oral tablet: 1 tab(s) orally 2 times a day  ritonavir 100 mg oral capsule: 1 cap(s) orally once a day   sulfamethoxazole-trimethoprim 800 mg-160 mg oral tablet: 1 tab(s) orally once a day  thiamine 100 mg oral tablet: 1 tab(s) orally once a day
64-year-old, black, female, domiciled in own apartment, case management services through [facility], medical history of HIV, type 2 diabetes, hypertension, psychiatric history of schizophrenia, cocaine use disorder, alcohol use disorder, admitted to inpatient psychiatry for psychotic symptoms with disorganized behavior.    #Hyponatremia/Hyperkalemia/Hypercalcemia   *Rule out multiple myeloma (unlikely per heme onc) - elevated LEAH Kappa, LEAH Lambda, decreased vitamin D - appreciate detailed heme onc note, will complete labs (requested urine from nursing) and require outpatient follow up.   Hypercalemia - PTH WNL, medicine and heme onc following.   Hyponatremia - Improving.     #Schizophrenia   Haldol 10 mg PO QHS   Cogentin .5 mg PO BID     #Akasthesia   Propranolol 10 mg PO BID     #HIV   Dolutegravir 50 mg oral tablet PO BID   Prezista 800 mg oral tablet PO BID   sulfamethoxazole-trimethoprim 800 mg-160 mg oral tablet PO Qdaily     #Hypertension   atorvastatin 10 mg oral tablet PO QHS (vascular protection)   Amlodipine 10 mg PO Qdaily per medicine     #Alcohol Use Disorder   Folic Acid and Thiamine     #T2DM   D/Cd Metformin 1000 mg PO BID (per medicine increased from 500 mg PO BID) per medicine due to decreased GFR.   Glimeperide 4 mg PO QAM.
64-year-old, black, female, domiciled in own apartment, case management services through [facility], medical history of HIV, type 2 diabetes, hypertension, psychiatric history of schizophrenia, cocaine use disorder, alcohol use disorder, admitted to inpatient psychiatry for psychotic symptoms with disorganized behavior.    #Hyponatremia/Hyperkalemia/Hypercalcemia   *Rule out multiple myeloma (unlikely per heme onc) - elevated LEAH Kappa, LEAH Lambda, decreased vitamin D - appreciate detailed heme onc note, will complete labs (requested urine from nursing)   Hypercalemia - PTH WNL, medicine and heme onc following. Ordered PTH-rp on 2/24.   Hyponatremia - Improving.     #Schizophrenia   Haldol 10 mg PO QHS   Cogentin .5 mg PO BID     #Akasthesia   Propranolol 10 mg PO BID     #HIV   Dolutegravir 50 mg oral tablet PO BID   Prezista 800 mg oral tablet PO BID   sulfamethoxazole-trimethoprim 800 mg-160 mg oral tablet PO Qdaily     #Hypertension   atorvastatin 10 mg oral tablet PO QHS (vascular protection)   Amlodipine 10 mg PO Qdaily per medicine     #Alcohol Use Disorder   Folic Acid and Thiamine     #T2DM   D/Cd Metformin 1000 mg PO BID (per medicine increased from 500 mg PO BID) per medicine due to decreased GFR.   Glimeperide 4 mg PO QAM.
64-year-old, black, female, domiciled with adult grandson, medical history of HIV, type 2 diabetes, hypertension, psychiatric history of schizophrenia, cocaine use disorder, alcohol use disorder, admitted to inpatient psychiatry for psychotic symptoms with disorganized behavior, course complicated by delirium and akathisia.      #Schizophrenia   Haldol 10 mg PO QHS   Cogentin .5 mg PO BID     #Akasthesia   Propranolol 10 mg PO BID     #HIV   Dolutegravir 50 mg oral tablet PO BID   Prezista 800 mg oral tablet PO BID   sulfamethoxazole-trimethoprim 800 mg-160 mg oral tablet PO Qdaily     #Hypertension   atorvastatin 10 mg oral tablet PO QHS (vascular protection)   Amlodipine 10 mg PO Qdaily per medicine     #Alcohol Use Disorder   Folic Acid and Thiamine     #T2DM   Metformin 1000 mg PO BID (per medicine increased from 500 mg PO BID)
64-year-old, black, female, domiciled with adult grandson, medical history of HIV, type 2 diabetes, hypertension, psychiatric history of schizophrenia, cocaine use disorder, alcohol use disorder, admitted to inpatient psychiatry for psychotic symptoms with disorganized behavior.    #Hyponatremia/Hyperkalemia/Hypercalcemia   *Rule out multiple myeloma - elevated LEAH Kappa, LEAH Lambda, decreased vitamin D,   Hypercalemia - PTH WNL, medicine following   Hyponatremia - corrected 134 per medicine (corrected for increased blood sugar)     #Schizophrenia   Haldol 10 mg PO QHS   Cogentin .5 mg PO BID     #Akasthesia   Propranolol 10 mg PO BID     #HIV   Dolutegravir 50 mg oral tablet PO BID   Prezista 800 mg oral tablet PO BID   sulfamethoxazole-trimethoprim 800 mg-160 mg oral tablet PO Qdaily     #Hypertension   atorvastatin 10 mg oral tablet PO QHS (vascular protection)   Amlodipine 10 mg PO Qdaily per medicine     #Alcohol Use Disorder   Folic Acid and Thiamine     #T2DM   D/Cd Metformin 1000 mg PO BID (per medicine increased from 500 mg PO BID) per medicine due to decreased GFR.   Glimeperide 4 mg PO QAM
64-year-old, black, female, domiciled with adult grandson, medical history of HIV, type 2 diabetes, hypertension, psychiatric history of schizophrenia, cocaine use disorder, alcohol use disorder, admitted to inpatient psychiatry for psychotic symptoms with disorganized behavior.    #Hyponatremia/Hyperkalemia/Hypercalcemia   Hypercalemia - PTH WNL, medicine following   Hyponatremia - corrected 134 per medicine (corrected for increased blood sugar)     #Schizophrenia   Haldol 10 mg PO QHS   Cogentin .5 mg PO BID     #Akasthesia   Propranolol 10 mg PO BID     #HIV   Dolutegravir 50 mg oral tablet PO BID   Prezista 800 mg oral tablet PO BID   sulfamethoxazole-trimethoprim 800 mg-160 mg oral tablet PO Qdaily     #Hypertension   atorvastatin 10 mg oral tablet PO QHS (vascular protection)   Amlodipine 10 mg PO Qdaily per medicine     #Alcohol Use Disorder   Folic Acid and Thiamine     #T2DM   D/Cd Metformin 1000 mg PO BID (per medicine increased from 500 mg PO BID) per medicine due to decreased GFR.   Glimeperide 4 mg PO QAM
64-year-old, black, female, domiciled in own apartment, case management services through [facility], medical history of HIV, type 2 diabetes, hypertension, psychiatric history of schizophrenia, cocaine use disorder, alcohol use disorder, admitted to inpatient psychiatry for psychotic symptoms with disorganized behavior.    #Hyponatremia/Hyperkalemia/Hypercalcemia   *Rule out multiple myeloma - elevated LEAH Kappa, LEAH Lambda, decreased vitamin D - appreciate detailed heme onc note, will complete labs (requested urine from nursing)   Hypercalemia - PTH WNL, medicine following   Hyponatremia - Improving (corrected 133)     #Schizophrenia   Haldol 10 mg PO QHS   Cogentin .5 mg PO BID     #Akasthesia   Propranolol 10 mg PO BID     #HIV   Dolutegravir 50 mg oral tablet PO BID   Prezista 800 mg oral tablet PO BID   sulfamethoxazole-trimethoprim 800 mg-160 mg oral tablet PO Qdaily     #Hypertension   atorvastatin 10 mg oral tablet PO QHS (vascular protection)   Amlodipine 10 mg PO Qdaily per medicine     #Alcohol Use Disorder   Folic Acid and Thiamine     #T2DM   D/Cd Metformin 1000 mg PO BID (per medicine increased from 500 mg PO BID) per medicine due to decreased GFR.   Glimeperide 4 mg PO QAM.
64-year-old, black, female, domiciled in own apartment, case management services through [facility], medical history of HIV, type 2 diabetes, hypertension, psychiatric history of schizophrenia, cocaine use disorder, alcohol use disorder, admitted to inpatient psychiatry for psychotic symptoms with disorganized behavior.    #Hyponatremia/Hyperkalemia/Hypercalcemia   *Rule out multiple myeloma (unlikely per heme onc) - elevated LEAH Kappa, LEAH Lambda, decreased vitamin D - appreciate detailed heme onc note, will complete labs (requested urine from nursing) and require outpatient follow up.   Hypercalemia - PTH WNL, medicine and heme onc following.   Hyponatremia - Improving.     #Schizophrenia   Haldol 10 mg PO QHS   Cogentin .5 mg PO BID     #Akasthesia   Propranolol 10 mg PO BID     #HIV   Dolutegravir 50 mg oral tablet PO BID   Prezista 800 mg oral tablet PO BID   sulfamethoxazole-trimethoprim 800 mg-160 mg oral tablet PO Qdaily     #Hypertension   atorvastatin 10 mg oral tablet PO QHS (vascular protection)   Amlodipine 10 mg PO Qdaily per medicine     #Alcohol Use Disorder   Folic Acid and Thiamine     #T2DM   D/Cd Metformin 1000 mg PO BID (per medicine increased from 500 mg PO BID) per medicine due to decreased GFR.   Glimeperide 4 mg PO QAM.
64-year-old, black, female, domiciled with adult grandson, medical history of HIV, type 2 diabetes, hypertension, psychiatric history of schizophrenia, cocaine use disorder, alcohol use disorder, admitted to inpatient psychiatry for psychotic symptoms with disorganized behavior.    #Hyponatremia/Hyperkalemia/Hypercalcemia   Hypercalemia - recheck calcium, obtain PTH level   Hyponatremia - corrected 132 per medicine (corrected for increased blood sugar)     #Schizophrenia   Haldol 10 mg PO QHS   Cogentin .5 mg PO BID     #Akasthesia   Propranolol 10 mg PO BID     #HIV   Dolutegravir 50 mg oral tablet PO BID   Prezista 800 mg oral tablet PO BID   sulfamethoxazole-trimethoprim 800 mg-160 mg oral tablet PO Qdaily     #Hypertension   atorvastatin 10 mg oral tablet PO QHS (vascular protection)   Amlodipine 10 mg PO Qdaily per medicine     #Alcohol Use Disorder   Folic Acid and Thiamine     #T2DM   D/Cd Metformin 1000 mg PO BID (per medicine increased from 500 mg PO BID) per medicine due to decreased GFR.   Glimeperide 4 mg PO QAM
64-year-old, black, female, domiciled with adult grandson, medical history of HIV, type 2 diabetes, hypertension, psychiatric history of schizophrenia, cocaine use disorder, alcohol use disorder, admitted to inpatient psychiatry for psychotic symptoms with disorganized behavior, course complicated by delirium and akathisia.      #Schizophrenia   Haldol 10 mg PO QHS   Cogentin .5 mg PO BID     #Akasthesia   Propranolol 10 mg PO BID     #HIV   Dolutegravir 50 mg oral tablet PO BID   Prezista 800 mg oral tablet PO BID   sulfamethoxazole-trimethoprim 800 mg-160 mg oral tablet PO Qdaily     #Hypertension   atorvastatin 10 mg oral tablet PO QHS (vascular protection)     #Alcohol Use Disorder   Folic Acid and Thiamine     #T2DM   Metformin 500 mg PO BID

## 2020-02-25 NOTE — PROGRESS NOTE BEHAVIORAL HEALTH - NSBHFUPINTERVALCCFT_PSY_A_CORE
" yes, yes, I know"
"I am
"I am going to live with my friend"
"I had to strain"
"I need to get my hair and nails"
"I threw up"
"I'm annoyed you always ask me the same questions"
"My son may visit tonight"
"my father got 25%"
Patient states, " I was born in [place] and of course I'm here."  Pt currently reported by staff nurse to be on 1:1 constant observation form safety due to risk of fall.
Pt is alert and oriented. Her mood is pleasant and she is not overtly psychotic.  PO intake has improved. Continue to monitor labs
Pt is more visible on unit and more verbal.  She was observed earlier playing cards with other patients.  She is oriented x3 and not overtly psychotic.  Complying with meds; no s/h ideation.  No overt delusions
"I think it was the crack"

## 2020-02-25 NOTE — PROGRESS NOTE BEHAVIORAL HEALTH - NSBHADMITIPOBSFT_PSY_A_CORE
as clinically indicated
as indicated
for safety
Per Unit Protocol
Per Unit Protocol
Unit protocol
Per Unit Protocol
Per Unit Protocol
Unit protocol
Per unit protocol

## 2020-02-25 NOTE — PROGRESS NOTE BEHAVIORAL HEALTH - NS ED BHA MSE GENERAL APPEARANCE
Well developed/No deformities present
Well developed
No deformities present/Well developed
Well developed/No deformities present
No deformities present/Well developed
Well developed
Well developed/No deformities present

## 2020-02-25 NOTE — PROGRESS NOTE BEHAVIORAL HEALTH - ABNORMAL MOVEMENTS
No abnormal movements/Other
No abnormal movements
Other/No abnormal movements
No abnormal movements
Other/No abnormal movements

## 2020-02-25 NOTE — PROGRESS NOTE BEHAVIORAL HEALTH - SECONDARY DX3
Cognitive impairment
Cognitive impairment
HIV infection, unspecified symptom status
Psychosis, unspecified psychosis type

## 2020-02-25 NOTE — PROGRESS NOTE BEHAVIORAL HEALTH - SECONDARY DX1
Mood disorder
HIV infection, unspecified symptom status
Mood disorder
HIV infection, unspecified symptom status
Alcohol use disorder, severe, in controlled environment
Cocaine use disorder
Alcohol use disorder, severe, in controlled environment
Alcohol use disorder, severe, in controlled environment
HIV infection, unspecified symptom status
Alcohol use disorder, severe, in controlled environment
Cocaine use disorder

## 2020-02-25 NOTE — PROGRESS NOTE BEHAVIORAL HEALTH - THOUGHT CONTENT
Unremarkable

## 2020-02-25 NOTE — PROGRESS NOTE BEHAVIORAL HEALTH - NS ED BHA MED ROS GASTROINTESTINAL
No complaints
Yes
No complaints
Yes
No complaints

## 2020-02-25 NOTE — PROGRESS NOTE BEHAVIORAL HEALTH - MUSCLE TONE / STRENGTH
Normal muscle tone/strength
Other
Normal muscle tone/strength
Other
Normal muscle tone/strength

## 2020-02-25 NOTE — PROGRESS NOTE BEHAVIORAL HEALTH - NSBHADMITIPREASON_PSY_A_CORE
Danger to self; mental illness expected to respond to inpatient care
Danger to self; mental illness expected to respond to inpatient care
other reason
Danger to self; mental illness expected to respond to inpatient care

## 2020-02-25 NOTE — PROGRESS NOTE BEHAVIORAL HEALTH - ORIENTED TO PERSON
Denies any other needs. Call light in reach. Daughter bedside.   
Yes

## 2020-02-25 NOTE — PROGRESS NOTE BEHAVIORAL HEALTH - SECONDARY DX2
Polysubstance dependence
Polysubstance dependence
Alcohol use disorder, severe, in controlled environment
HIV infection, unspecified symptom status
Type 2 diabetes mellitus without complication, without long-term current use of insulin
HIV infection, unspecified symptom status
HIV infection, unspecified symptom status

## 2020-02-26 DIAGNOSIS — I10 ESSENTIAL (PRIMARY) HYPERTENSION: ICD-10-CM

## 2020-02-26 LAB — GLUCOSE BLDC GLUCOMTR-MCNC: 87 MG/DL — SIGNIFICANT CHANGE UP (ref 70–99)

## 2020-02-26 PROCEDURE — 99231 SBSQ HOSP IP/OBS SF/LOW 25: CPT

## 2020-02-26 RX ORDER — AMLODIPINE BESYLATE 2.5 MG/1
5 TABLET ORAL AT BEDTIME
Refills: 0 | Status: DISCONTINUED | OUTPATIENT
Start: 2020-02-26 | End: 2020-02-27

## 2020-02-26 RX ORDER — GLIMEPIRIDE 1 MG
2 TABLET ORAL
Refills: 0 | Status: DISCONTINUED | OUTPATIENT
Start: 2020-02-26 | End: 2020-02-27

## 2020-02-26 RX ADMIN — RITONAVIR 100 MILLIGRAM(S): 100 TABLET, FILM COATED ORAL at 20:28

## 2020-02-26 RX ADMIN — Medication 0.5 MILLIGRAM(S): at 08:31

## 2020-02-26 RX ADMIN — Medication 0.5 MILLIGRAM(S): at 20:07

## 2020-02-26 RX ADMIN — ATORVASTATIN CALCIUM 10 MILLIGRAM(S): 80 TABLET, FILM COATED ORAL at 20:07

## 2020-02-26 RX ADMIN — Medication 1 MILLIGRAM(S): at 08:30

## 2020-02-26 RX ADMIN — DOLUTEGRAVIR SODIUM 50 MILLIGRAM(S): 25 TABLET, FILM COATED ORAL at 08:30

## 2020-02-26 RX ADMIN — Medication 1 TABLET(S): at 08:30

## 2020-02-26 RX ADMIN — DOLUTEGRAVIR SODIUM 50 MILLIGRAM(S): 25 TABLET, FILM COATED ORAL at 20:07

## 2020-02-26 RX ADMIN — Medication 100 MILLIGRAM(S): at 08:30

## 2020-02-26 RX ADMIN — Medication 4 MILLIGRAM(S): at 08:31

## 2020-02-26 RX ADMIN — DARUNAVIR 800 MILLIGRAM(S): 75 TABLET, FILM COATED ORAL at 08:30

## 2020-02-26 RX ADMIN — HALOPERIDOL DECANOATE 10 MILLIGRAM(S): 100 INJECTION INTRAMUSCULAR at 20:06

## 2020-02-26 RX ADMIN — AMLODIPINE BESYLATE 5 MILLIGRAM(S): 2.5 TABLET ORAL at 20:07

## 2020-02-26 RX ADMIN — Medication 1 PATCH: at 08:31

## 2020-02-26 RX ADMIN — Medication 1 PATCH: at 20:05

## 2020-02-26 NOTE — DISCHARGE NOTE BEHAVIORAL HEALTH - NSBHDCTHERAPYFT_PSY_A_CORE
Group therapy, unit milieu, individual treatment planning, social work evaluation and treatment, physical therapy, medical, renal, and oncology consults.

## 2020-02-26 NOTE — DISCHARGE NOTE BEHAVIORAL HEALTH - MEDICATION SUMMARY - MEDICATIONS TO TAKE
I will START or STAY ON the medications listed below when I get home from the hospital:    propranolol 10 mg oral tablet  -- 1 tab(s) by mouth 2 times a day x 14 days until otherwise directed by physician   -- Indication: For Akasthesia    glimepiride 2 mg oral tablet  -- 1 tab(s) by mouth once a day (before a meal) x 14 days until otherwise directed  -- Indication: For Type 2 diabetes mellitus without complication, without long-term current use of insulin    atorvastatin 10 mg oral tablet  -- 1 tab(s) by mouth once a day (at bedtime) x 14 days until otherwise directed by physician   -- Indication: For Vascular Protection    Cogentin 0.5 mg oral tablet  -- 0.5 tab(s) by mouth 2 times a day   -- Indication: For Extrapyramidal symptom prophylaxis (home medication)     benztropine 0.5 mg oral tablet  -- 1 tab(s) by mouth 2 times a day x 14 days or until otherwise directed     -- Indication: For Extrapyramidal symptom prophylaxis (home medication)     haloperidol 10 mg oral tablet  -- 1 tab(s) by mouth once a day (at bedtime) x 14 days until otherwise directed by physician   -- Indication: For Delirium due to substance or medication    Prezista 800 mg oral tablet  -- 1 tab(s) by mouth once a day x 14 days until otherwise directed by physician   -- Check with your doctor before becoming pregnant.  It is very important that you take or use this exactly as directed.  Do not skip doses or discontinue unless directed by your doctor.  Obtain medical advice before taking any non-prescription drugs as some may affect the action of this medication.  Swallow whole.  Do not crush.  Take with food or milk.    -- Indication: For HIV (human immunodeficiency virus infection)    dolutegravir 50 mg oral tablet  -- 1 tab(s) by mouth 2 times a day x 14 days until otherwise directed by physician   -- Indication: For HIV (human immunodeficiency virus infection)    ritonavir 100 mg oral capsule  -- 1 cap(s) by mouth once a day x 14 days until otherwise directed by physician   -- Check with your doctor before becoming pregnant.  It is very important that you take or use this exactly as directed.  Do not skip doses or discontinue unless directed by your doctor.  Keep in refrigerator.  Do not freeze.  Obtain medical advice before taking any non-prescription drugs as some may affect the action of this medication.  Take with food or milk.    -- Indication: For HIV (human immunodeficiency virus infection)    amLODIPine 5 mg oral tablet  -- 1 tab(s) by mouth once a day (at bedtime) x 14 days until otherwise directed by physician    -- Indication: For Hypertension    polyethylene glycol 3350 oral powder for reconstitution  -- 17 gram(s) by mouth once a day x 14 days, As Needed -consitpation   -- Indication: For Constipation    nicotine 14 mg/24 hr transdermal film, extended release  -- 14 mg/24h by transdermal patch once a day x 14 days   -- Indication: For Nicotine     sulfamethoxazole-trimethoprim 800 mg-160 mg oral tablet  -- 14 tab(s) by mouth once a day x 14 days until otherwise directed by physician   -- Indication: For HIV (human immunodeficiency virus infection)    folic acid 1 mg oral tablet  -- 1 tab(s) by mouth once a day x 14 days until otherwise directed by physician   -- Indication: For Nutritional Supplement     thiamine 100 mg oral tablet  -- 1 tab(s) by mouth once a day x 14 days until otherwise directed by physician   -- Indication: For Nutritional Supplement

## 2020-02-26 NOTE — DISCHARGE NOTE BEHAVIORAL HEALTH - NSBHDCRESPONSEFT_PSY_A_CORE
Ms. Carrasco presented with disorientation, psychotic symptoms, and attentional difficulties in the context of relapse on crack cocaine. Her symptoms were consistent with delirium. She had a side effect of akasthesia which was noted on admission, and responded well to propranolol. Ms. Carrasco presented with disorientation, psychotic symptoms, and attentional difficulties in the context of relapse on crack cocaine. Her symptoms were consistent with delirium, which manifested psychotic sx (as described in HPI) with disorientation (alert only to person), and attentional difficulties. As she was treated with Haldol, unit milieu, she improved in mental status. Medical condition prevented immediate discharge, however psychiatrically was quickly stabilized. She had a side effect of akathisia which was noted on admission, and responded well to propranolol.

## 2020-02-26 NOTE — DISCHARGE NOTE BEHAVIORAL HEALTH - NSBHDCHANDOFFFT_PSY_A_CORE
Handoff provided to dual focus clinic on discharge. On 2/26/20, handoff provided by psychiatrist to  via [facility] Annie Camejo, [phone]

## 2020-02-26 NOTE — CHART NOTE - NSCHARTNOTEFT_GEN_A_CORE
The treatment team met with pt. to discuss treatment plan, medications and discharge plan.  Pt. presents with no change in mental status at this time.  She presents with some confusion which does appear to be related to any psychiatric symptoms. Pt. denies any suicidal or homicidal ideations or any A/V hallucinations.  Writer spoke with pt.'s daughter, Mario, regarding pt.'s discharge scheduled for tomorrow, 2/27.    Pt will be escorted home by her daughter and will reside with her daughter in a private apartment in the community.  She will follow up with this hospital's outpatient dual focus program, tomorrow at 2 pm.  Pt's daughter was made aware and no issues or concerns were verbalized.  In addition, pt. will continue to receive services through [facility].    Mental Status Exam:    Mood-  Euthymic    Sleep-  Normal    Appetite-Normal    ADL's-  Fair    Thought process-  Illogical/Impaired Reasoning    Observation-q 10 minutes    Pt. presents as stable for discharge on 2/27.

## 2020-02-26 NOTE — DISCHARGE NOTE BEHAVIORAL HEALTH - FAMILY HISTORY OF PSYCHIATRIC ILLNESS
Has apartment, services through [facility]. Boyfriends substance use contributed to her relapse.

## 2020-02-26 NOTE — DISCHARGE NOTE BEHAVIORAL HEALTH - NSBHDCDXVALIDYESFT_PSY_A_CORE
Patient was admitted with diagnosis of schizophrenia. Details are unclear, daughter does endorse psychotic symptoms outside of substance use (crack cocaine), however, patients current presentation appears to be significantly related to relapse on cocaine, therefore working impression is substance induced psychosis/cocaine use disorder, severe, in controlled environment.

## 2020-02-26 NOTE — DISCHARGE NOTE BEHAVIORAL HEALTH - NSBHDCSUBSTHXFT_PSY_A_CORE
Prior history of cocaine use disorder, alcohol use disorder. Daughter endorses relapse on alcohol and cannabis, uncertain about other substances over last 1.5 years. Patient endorses crack cocaine use prior to admission.

## 2020-02-26 NOTE — DISCHARGE NOTE BEHAVIORAL HEALTH - HPI (INCLUDE ILLNESS QUALITY, SEVERITY, DURATION, TIMING, CONTEXT, MODIFYING FACTORS, ASSOCIATED SIGNS AND SYMPTOMS)
HPI (include illness quality, severity, duration, timing, context, modifying factors, associated signs and symptoms): Patient is 65yo F domiciled in private residence with grown grandson, with PMH of HIV, HTN, DM, and PPH of Schizophrenia (as per daughter), multiple IPP admissions (mostly at [facility], and most recently several months ago), history of Crack Cocaine use disorder, Nicotine use disorder, and current use of alcohol and marijuana, who presented to the ED today BIB EMS for altered mental status. Psychiatry was consulted to evaluate patient for AMS and medication management.    	Upon approach, patient is lying in ED hospital bed, noticeably malodorous. Patient is A+Ox2 and explains that her sister, Kavita brought her to the ED today for "being erratic. I was moving funny like when the music comes on." Patient also recalls "there was blood down there," pointing to her genitals, and with further questioning, she clarifies noticing blood in her urine today. Patient endorses not taking any of her medications recently, and she also endorses recent alcohol and cannabis use earlier today but is unable to elaborate on any more details. Patient also endorses 1ppd cigarette use. Patient appears confused and becomes increasingly less able to engage in a meaningful interview.    	Collateral was obtained over the phone from patient's daughter, Mario Carrasco, who was with the patient at the hospital earlier in the day. She confirms that the patient's sister, Kavita, was not at the hospital despite what the patient had said during the interview. Mario states that she has not been talking to the patient for the last year until 4 weeks ago; however, she remains a relatively reliable source of information at present time. She states that the patient has been off of her psychiatric medications for almost a year now, and "this is what happens when she goes off her psych meds. She bugs out." She recalls her mother spending time inpatient at [facility] several months ago but cannot provide more information regarding that admission. Mario explains that "it all started 2 years ago when she got this boyfriend. She stopped taking her psych meds, and now she only drinks and smokes marijuana." She believes the patient's behavior has been deteriorating over the last year but noticeably has gotten worse this past week - "on Monday she walked out of the house to my brother's in a smock and slippers." Then today, "she was tearing up the house," at which point patient's grandson called 911 to have her go to the ED. Mario also endorses significant weight loss over the last year, stating, "she doesn't eat." She also does not believe her mother has been showering or taking care of herself recently as well.    Called patient's pharmacy, [facility]. Her last prescription she picked up was in April, 2019 - Haldol 10mg QHS, Cogentin 0.5mg BID, prescribed by [place] [facility] at [facility].

## 2020-02-26 NOTE — DISCHARGE NOTE BEHAVIORAL HEALTH - NSBHDCCONDITIONFT_PSY_A_CORE
She responded to treatment with haloperidol as well as unit milieu and time away from cocaine with improved (but still affected) mental status. On discharge she is fully oriented, however, does respond to questions in an off topic manner and occasionally smiles inappropriate. She is without perceptual disturbances, mood disturbance on discharge.

## 2020-02-26 NOTE — DISCHARGE NOTE BEHAVIORAL HEALTH - PAST PSYCHIATRIC HISTORY
As per collateral, patient has long history of psychiatric illness with multiple IPP admissions, most recently a few months ago at [facility]. Patient and collateral are not able to provide a name of a psychiatrist, and upon calling patient's pharmacy, [facility], her last prescription she picked up was in April, 2019 - Haldol 10mg QHS, Cogentin 0.5mg BID, prescribed by [place] [facility] at [facility].

## 2020-02-26 NOTE — DISCHARGE NOTE BEHAVIORAL HEALTH - NSBHDCCASEMGRFT_PSY_A_CORE
[phone]

## 2020-02-26 NOTE — PROGRESS NOTE ADULT - SUBJECTIVE AND OBJECTIVE BOX
pt stable alert in NAD  no new complaints    DEPRESSION  ^DEPRESSION  Handoff  High cholesterol  Diabetes  HTN (hypertension)  HIV (human immunodeficiency virus infection)  Psychosis, unspecified psychosis type  HIV infection, unspecified symptom status  Psychosis, unspecified psychosis type  Polysubstance abuse  Symptomatic HIV infection  Cocaine use disorder, severe, in sustained remission, in controlled environment  Hyponatremia  Hypercalcemia  CKD (chronic kidney disease) stage 3, GFR 30-59 ml/min  HIV infection, unspecified symptom status  Alcohol use disorder, severe, in controlled environment  HIV infection, unspecified symptom status  Cocaine use disorder  Disorganized schizophrenia  Psychosis, unspecified psychosis type  Type 2 diabetes mellitus without complication, without long-term current use of insulin  HIV (human immunodeficiency virus infection)  HTN (hypertension)  Diabetes  Psychosis    HEALTH ISSUES - PROBLEM Dx:  Psychosis, unspecified psychosis type  HIV infection, unspecified symptom status  Psychosis, unspecified psychosis type  Polysubstance abuse: Polysubstance abuse  Symptomatic HIV infection: Symptomatic HIV infection  Cocaine use disorder, severe, in sustained remission, in controlled environment  Hyponatremia: Hyponatremia  Hypercalcemia: Hypercalcemia  CKD (chronic kidney disease) stage 3, GFR 30-59 ml/min: CKD (chronic kidney disease) stage 3, GFR 30-59 ml/min  HIV infection, unspecified symptom status  Alcohol use disorder, severe, in controlled environment  HIV infection, unspecified symptom status  Cocaine use disorder  Disorganized schizophrenia  Psychosis, unspecified psychosis type: Psychosis, unspecified psychosis type  Type 2 diabetes mellitus without complication, without long-term current use of insulin: Type 2 diabetes mellitus without complication, without long-term current use of insulin  HIV (human immunodeficiency virus infection): HIV (human immunodeficiency virus infection)  HTN (hypertension): HTN (hypertension)  Diabetes: Diabetes  Psychosis: Psychosis        PAST MEDICAL & SURGICAL HISTORY:  High cholesterol  Diabetes  HTN (hypertension)  HIV (human immunodeficiency virus infection)    No Known Allergies      FAMILY HISTORY:      aluminum hydroxide/magnesium hydroxide/simethicone Suspension 30 milliLiter(s) Oral every 4 hours PRN  amLODIPine   Tablet 10 milliGRAM(s) Oral daily  atorvastatin 10 milliGRAM(s) Oral at bedtime  benztropine 0.5 milliGRAM(s) Oral two times a day  darunavir 800 milliGRAM(s) Oral daily  dolutegravir 50 milliGRAM(s) Oral two times a day  folic acid 1 milliGRAM(s) Oral daily  glimepiride. 4 milliGRAM(s) Oral with breakfast  guaifenesin/dextromethorphan  Syrup 10 milliLiter(s) Oral every 6 hours PRN  haloperidol     Tablet 10 milliGRAM(s) Oral at bedtime  hydrOXYzine hydrochloride 50 milliGRAM(s) Oral every 6 hours PRN  influenza   Vaccine 0.5 milliLiter(s) IntraMuscular once  magnesium hydroxide Suspension 30 milliLiter(s) Oral daily PRN  magnesium hydroxide Suspension 30 milliLiter(s) Oral once PRN  nicotine -  14 mG/24Hr(s) Patch 1 patch Transdermal daily  ondansetron    Tablet 4 milliGRAM(s) Oral once  ondansetron    Tablet 4 milliGRAM(s) Oral every 6 hours PRN  polyethylene glycol 3350 17 Gram(s) Oral daily PRN  propranolol 10 milliGRAM(s) Oral two times a day  ritonavir Tablet 100 milliGRAM(s) Oral every 24 hours  thiamine 100 milliGRAM(s) Oral daily  trimethoprim  160 mG/sulfamethoxazole 800 mG 1 Tablet(s) Oral daily      T(C): 36 (02-26-20 @ 06:07), Max: 37.9 (02-25-20 @ 15:29)  HR: 66 (02-26-20 @ 06:07) (66 - 101)  BP: 99/65 (02-26-20 @ 06:07) (99/65 - 118/74)  RR: 18 (02-26-20 @ 06:07) (16 - 18)  SpO2: --    PE;  general:  no cahnges in nad  stasble    Lungs:    Heart:    EXT:    Neuro:  alert nod eciits      13.1  39.3  7.93  15  1.4  5.2  95                      CAPILLARY BLOOD GLUCOSE      POCT Blood Glucose.: 87 mg/dL (26 Feb 2020 06:28)  POCT Blood Glucose.: 96 mg/dL (25 Feb 2020 16:21)  POCT Blood Glucose.: 58 mg/dL (25 Feb 2020 16:03)

## 2020-02-26 NOTE — DISCHARGE NOTE BEHAVIORAL HEALTH - NSBHDCMEDICALFT_PSY_A_CORE
#Akasthesia   Propranolol 10 mg PO BID     #HIV   Dolutegravir 50 mg oral tablet PO BID   Prezista 800 mg oral tablet PO BID   sulfamethoxazole-trimethoprim 800 mg-160 mg oral tablet PO Qdaily     #Hypertension   atorvastatin 10 mg oral tablet PO QHS (vascular protection)   Amlodipine 5 mg PO Qdaily per medicine (reduced from 10 mg PO Qdaily on 2/26/19).     #Alcohol Use Disorder   Folic Acid and Thiamine     #T2DM   D/Cd Metformin 1000 mg PO BID (per medicine increased from 500 mg PO BID) per medicine due to decreased GFR.   Glimeperide 4 mg PO QAM. Patient was initially seen in ED, was thought to have delirium possibly secondary to decompensated HIV with infection, however cleared by ID on admission. Was restarted on HIV medications (had been noncompliant prior to admission). Had electrolyte abnormalities, acute kidney injury. Was seen by nephrology, improved with hydration, working diagnosis - chronic kidney disease. Was seen by heme onc for hypercalcemia concern for multiple myeloma did not feel likely -  "Monoclonal gammopathy (igG kappa and lambda) , too small to quantitate , normal free light chain ratio ( for renal insufficiency ) . Probable MGUS , doubt myeloma or other plasma cell disorders ."      #Akasthesia   Propranolol 10 mg PO BID     #HIV   Dolutegravir 50 mg oral tablet PO BID   Prezista 800 mg oral tablet PO BID   sulfamethoxazole-trimethoprim 800 mg-160 mg oral tablet PO Qdaily     #Hypertension   atorvastatin 10 mg oral tablet PO QHS (vascular protection)   Amlodipine 5 mg PO Qdaily per medicine (reduced from 10 mg PO Qdaily on 2/26/19).     #Alcohol Use Disorder   Folic Acid and Thiamine     #T2DM   D/Cd Metformin 1000 mg PO BID (per medicine increased from 500 mg PO BID) per medicine due to decreased GFR.   Glimeperide 4 mg PO QAM.

## 2020-02-26 NOTE — DISCHARGE NOTE BEHAVIORAL HEALTH - NSBHDCSWCOMMENTSFT_PSY_A_CORE
[facility] TRANSFUSION MEDICINE  Section of Transfusion Medicine and Histocompatibility  HLA Note    Case Details   Diagnosis:  CHF (NYHA class IV, ACC/AHA stage D) [I50.9]  Blood Type: O POS  HLA Type:   Class I:  No results found for: JGVZ0VW, XRDA5UQ, GCHP0JK, FRTX8GZ, YCCSB8HO, MFURL4AS, FNASN8PA, VWLIK5MO  Class II:  No results found for: [id number], AWVWIK26EG, [id number], [id number], MDQCT8JL, FBKDY2NQ  Recent Antibody Screen/ID Results:   Lab Results   Component Value Date    YU9USQG Negative 07/06/2017    CIIAB Negative 07/06/2017     Auto T Cell Crossmatch Results:  No results found for: XMTCELLRES  Auto B Cell Crossmatch Results:  No results found for: BCELLRES  Assessment     Interpretation: As below    Strongly Recommended Unacceptable Antigens: None    Crossmatch Expectations: A virtual crossmatch is recommended, which should be negative.    Please call the HLA Lab [phone] with any concerns or questions.    Kim Astorga MD , PhD  EDGARDO De La Fuente MD, [place]  Section of Transfusion Medicine & Histocompatibility  Department of Pathology and Laboratory Medicine  [facility]  07/11/2017             Discharge information faxed to the next level of care-Dual Focus-[phone] Discharge information faxed to the next level of care-[facility]-[phone] on 2/27/20 at 1 pm

## 2020-02-26 NOTE — DISCHARGE NOTE BEHAVIORAL HEALTH - SECONDARY DIAGNOSIS.
Cocaine use disorder, severe, in early remission, in controlled environment HIV infection, unspecified symptom status Alcohol use disorder, severe, in controlled environment

## 2020-02-27 VITALS
DIASTOLIC BLOOD PRESSURE: 62 MMHG | RESPIRATION RATE: 18 BRPM | TEMPERATURE: 98 F | HEART RATE: 72 BPM | SYSTOLIC BLOOD PRESSURE: 90 MMHG

## 2020-02-27 LAB — GLUCOSE BLDC GLUCOMTR-MCNC: 73 MG/DL — SIGNIFICANT CHANGE UP (ref 70–99)

## 2020-02-27 PROCEDURE — 99238 HOSP IP/OBS DSCHRG MGMT 30/<: CPT

## 2020-02-27 RX ORDER — POLYETHYLENE GLYCOL 3350 17 G/17G
17 POWDER, FOR SOLUTION ORAL
Qty: 238 | Refills: 0
Start: 2020-02-27 | End: 2020-03-11

## 2020-02-27 RX ORDER — PROPRANOLOL HCL 160 MG
1 CAPSULE, EXTENDED RELEASE 24HR ORAL
Qty: 28 | Refills: 0
Start: 2020-02-27 | End: 2020-03-11

## 2020-02-27 RX ORDER — NICOTINE POLACRILEX 2 MG
14 GUM BUCCAL
Qty: 14 | Refills: 0
Start: 2020-02-27 | End: 2020-03-11

## 2020-02-27 RX ORDER — DARUNAVIR 75 MG/1
1 TABLET, FILM COATED ORAL
Qty: 14 | Refills: 0
Start: 2020-02-27 | End: 2020-03-11

## 2020-02-27 RX ORDER — AMLODIPINE BESYLATE 2.5 MG/1
1 TABLET ORAL
Qty: 14 | Refills: 0
Start: 2020-02-27 | End: 2020-03-11

## 2020-02-27 RX ORDER — ATORVASTATIN CALCIUM 80 MG/1
1 TABLET, FILM COATED ORAL
Qty: 14 | Refills: 0
Start: 2020-02-27 | End: 2020-03-11

## 2020-02-27 RX ORDER — GLIMEPIRIDE 1 MG
1 TABLET ORAL
Qty: 14 | Refills: 0
Start: 2020-02-27 | End: 2020-03-11

## 2020-02-27 RX ORDER — BENZTROPINE MESYLATE 1 MG
1 TABLET ORAL
Qty: 28 | Refills: 0
Start: 2020-02-27 | End: 2020-03-11

## 2020-02-27 RX ORDER — DOLUTEGRAVIR SODIUM 25 MG/1
1 TABLET, FILM COATED ORAL
Qty: 28 | Refills: 0
Start: 2020-02-27 | End: 2020-03-11

## 2020-02-27 RX ORDER — FOLIC ACID 0.8 MG
1 TABLET ORAL
Qty: 14 | Refills: 0
Start: 2020-02-27 | End: 2020-03-11

## 2020-02-27 RX ORDER — THIAMINE MONONITRATE (VIT B1) 100 MG
1 TABLET ORAL
Qty: 14 | Refills: 0
Start: 2020-02-27 | End: 2020-03-11

## 2020-02-27 RX ORDER — HALOPERIDOL DECANOATE 100 MG/ML
1 INJECTION INTRAMUSCULAR
Qty: 14 | Refills: 0
Start: 2020-02-27 | End: 2020-03-11

## 2020-02-27 RX ORDER — BENZTROPINE MESYLATE 1 MG
0.5 TABLET ORAL
Qty: 14 | Refills: 0
Start: 2020-02-27 | End: 2020-03-11

## 2020-02-27 RX ORDER — RITONAVIR 100 MG/1
1 TABLET, FILM COATED ORAL
Qty: 14 | Refills: 0
Start: 2020-02-27 | End: 2020-03-11

## 2020-02-27 RX ADMIN — Medication 2 MILLIGRAM(S): at 08:02

## 2020-02-27 RX ADMIN — Medication 0.5 MILLIGRAM(S): at 08:02

## 2020-02-27 RX ADMIN — DOLUTEGRAVIR SODIUM 50 MILLIGRAM(S): 25 TABLET, FILM COATED ORAL at 08:02

## 2020-02-27 RX ADMIN — Medication 1 PATCH: at 08:07

## 2020-02-27 RX ADMIN — Medication 1 MILLIGRAM(S): at 08:02

## 2020-02-27 RX ADMIN — Medication 1 PATCH: at 08:03

## 2020-02-27 RX ADMIN — Medication 1 TABLET(S): at 08:02

## 2020-02-27 RX ADMIN — INFLUENZA VIRUS VACCINE 0.5 MILLILITER(S): 15; 15; 15; 15 SUSPENSION INTRAMUSCULAR at 08:41

## 2020-02-27 RX ADMIN — Medication 100 MILLIGRAM(S): at 08:02

## 2020-02-27 RX ADMIN — DARUNAVIR 800 MILLIGRAM(S): 75 TABLET, FILM COATED ORAL at 08:02

## 2020-02-27 RX ADMIN — Medication 1 PATCH: at 08:02

## 2020-02-27 NOTE — PROGRESS NOTE ADULT - REASON FOR ADMISSION
psychosis

## 2020-02-27 NOTE — CHART NOTE - NSCHARTNOTEFT_GEN_A_CORE
Pt is scheduled for discharge on this date.  She presents with no acute symptoms at this time.  Pt. denies any suicidal or homicidal ideations or any A/V hallucinations.  She continues to remain isolative to her room.  She states she is ready for discharge on this date.    Writer spoke with pt.'s daughter on 2/26 in regards to pt.'s discharge.  Pt. will be residing with her daughter upon discharge.  She will follow up at this hospital's dual focus program on this date a 2 pm.  Pt.'s daughter was made aware.  Pt will [facility] to receive [facility] through [facility].  She presents as stable for discharge on this date.

## 2020-02-27 NOTE — PROGRESS NOTE ADULT - SUBJECTIVE AND OBJECTIVE BOX
pt stable alert in NAD  no new complaints    DEPRESSION  ^DEPRESSION  Handoff  High cholesterol  Diabetes  HTN (hypertension)  HIV (human immunodeficiency virus infection)  Delirium due to substance or medication  Essential hypertension  Psychosis, unspecified psychosis type  HIV infection, unspecified symptom status  Psychosis, unspecified psychosis type  Polysubstance abuse  Symptomatic HIV infection  Cocaine use disorder, severe, in sustained remission, in controlled environment  Hyponatremia  Hypercalcemia  CKD (chronic kidney disease) stage 3, GFR 30-59 ml/min  HIV infection, unspecified symptom status  Alcohol use disorder, severe, in controlled environment  HIV infection, unspecified symptom status  Cocaine use disorder  Disorganized schizophrenia  Psychosis, unspecified psychosis type  Type 2 diabetes mellitus without complication, without long-term current use of insulin  HIV (human immunodeficiency virus infection)  HTN (hypertension)  Diabetes  Psychosis  Alcohol use disorder, severe, in controlled environment  HIV infection, unspecified symptom status  Cocaine use disorder, severe, in early remission, in controlled environment    HEALTH ISSUES - PROBLEM Dx:  Essential hypertension: Essential hypertension  Psychosis, unspecified psychosis type  HIV infection, unspecified symptom status  Psychosis, unspecified psychosis type  Polysubstance abuse: Polysubstance abuse  Symptomatic HIV infection: Symptomatic HIV infection  Cocaine use disorder, severe, in sustained remission, in controlled environment  Hyponatremia: Hyponatremia  Hypercalcemia: Hypercalcemia  CKD (chronic kidney disease) stage 3, GFR 30-59 ml/min: CKD (chronic kidney disease) stage 3, GFR 30-59 ml/min  HIV infection, unspecified symptom status  Alcohol use disorder, severe, in controlled environment  HIV infection, unspecified symptom status  Cocaine use disorder  Disorganized schizophrenia  Psychosis, unspecified psychosis type: Psychosis, unspecified psychosis type  Type 2 diabetes mellitus without complication, without long-term current use of insulin: Type 2 diabetes mellitus without complication, without long-term current use of insulin  HIV (human immunodeficiency virus infection): HIV (human immunodeficiency virus infection)  HTN (hypertension): HTN (hypertension)  Diabetes: Diabetes  Psychosis: Psychosis        PAST MEDICAL & SURGICAL HISTORY:  High cholesterol  Diabetes  HTN (hypertension)  HIV (human immunodeficiency virus infection)    No Known Allergies      FAMILY HISTORY:      aluminum hydroxide/magnesium hydroxide/simethicone Suspension 30 milliLiter(s) Oral every 4 hours PRN  amLODIPine   Tablet 5 milliGRAM(s) Oral at bedtime  atorvastatin 10 milliGRAM(s) Oral at bedtime  benztropine 0.5 milliGRAM(s) Oral two times a day  darunavir 800 milliGRAM(s) Oral daily  dolutegravir 50 milliGRAM(s) Oral two times a day  folic acid 1 milliGRAM(s) Oral daily  glimepiride. 2 milliGRAM(s) Oral with breakfast  guaifenesin/dextromethorphan  Syrup 10 milliLiter(s) Oral every 6 hours PRN  haloperidol     Tablet 10 milliGRAM(s) Oral at bedtime  hydrOXYzine hydrochloride 50 milliGRAM(s) Oral every 6 hours PRN  influenza   Vaccine 0.5 milliLiter(s) IntraMuscular once  magnesium hydroxide Suspension 30 milliLiter(s) Oral daily PRN  magnesium hydroxide Suspension 30 milliLiter(s) Oral once PRN  nicotine -  14 mG/24Hr(s) Patch 1 patch Transdermal daily  ondansetron    Tablet 4 milliGRAM(s) Oral once  ondansetron    Tablet 4 milliGRAM(s) Oral every 6 hours PRN  polyethylene glycol 3350 17 Gram(s) Oral daily PRN  propranolol 10 milliGRAM(s) Oral two times a day  ritonavir Tablet 100 milliGRAM(s) Oral every 24 hours  thiamine 100 milliGRAM(s) Oral daily  trimethoprim  160 mG/sulfamethoxazole 800 mG 1 Tablet(s) Oral daily      T(C): 36.7 (02-27-20 @ 08:11), Max: 37.1 (02-27-20 @ 05:31)  HR: 72 (02-27-20 @ 08:11) (72 - 99)  BP: 90/62 (02-27-20 @ 08:11) (90/62 - 120/71)  RR: 18 (02-27-20 @ 08:11) (16 - 18)  SpO2: --    PE;  general: no acute cahgne sin nad    Lungs:    Heart:    EXT:    Neuro: nodeficits                          CAPILLARY BLOOD GLUCOSE      POCT Blood Glucose.: 73 mg/dL (27 Feb 2020 06:43)

## 2020-02-28 LAB — PTH RELATED PROT SERPL-MCNC: 2.9 PMOL/L — SIGNIFICANT CHANGE UP

## 2020-03-03 DIAGNOSIS — R63.1 POLYDIPSIA: ICD-10-CM

## 2020-03-03 DIAGNOSIS — F17.210 NICOTINE DEPENDENCE, CIGARETTES, UNCOMPLICATED: ICD-10-CM

## 2020-03-03 DIAGNOSIS — E11.65 TYPE 2 DIABETES MELLITUS WITH HYPERGLYCEMIA: ICD-10-CM

## 2020-03-03 DIAGNOSIS — T50.916A UNDERDOSING OF MULTIPLE UNSPECIFIED DRUGS, MEDICAMENTS AND BIOLOGICAL SUBSTANCES, INITIAL ENCOUNTER: ICD-10-CM

## 2020-03-03 DIAGNOSIS — F10.20 ALCOHOL DEPENDENCE, UNCOMPLICATED: ICD-10-CM

## 2020-03-03 DIAGNOSIS — K59.00 CONSTIPATION, UNSPECIFIED: ICD-10-CM

## 2020-03-03 DIAGNOSIS — E78.00 PURE HYPERCHOLESTEROLEMIA, UNSPECIFIED: ICD-10-CM

## 2020-03-03 DIAGNOSIS — Z91.14 PATIENT'S OTHER NONCOMPLIANCE WITH MEDICATION REGIMEN: ICD-10-CM

## 2020-03-03 DIAGNOSIS — N18.3 CHRONIC KIDNEY DISEASE, STAGE 3 (MODERATE): ICD-10-CM

## 2020-03-03 DIAGNOSIS — Z56.0 UNEMPLOYMENT, UNSPECIFIED: ICD-10-CM

## 2020-03-03 DIAGNOSIS — E83.52 HYPERCALCEMIA: ICD-10-CM

## 2020-03-03 DIAGNOSIS — F29 UNSPECIFIED PSYCHOSIS NOT DUE TO A SUBSTANCE OR KNOWN PHYSIOLOGICAL CONDITION: ICD-10-CM

## 2020-03-03 DIAGNOSIS — T43.4X5A ADVERSE EFFECT OF BUTYROPHENONE AND THIOTHIXENE NEUROLEPTICS, INITIAL ENCOUNTER: ICD-10-CM

## 2020-03-03 DIAGNOSIS — Z91.128 PATIENT'S INTENTIONAL UNDERDOSING OF MEDICATION REGIMEN FOR OTHER REASON: ICD-10-CM

## 2020-03-03 DIAGNOSIS — E86.0 DEHYDRATION: ICD-10-CM

## 2020-03-03 DIAGNOSIS — N17.9 ACUTE KIDNEY FAILURE, UNSPECIFIED: ICD-10-CM

## 2020-03-03 DIAGNOSIS — E87.5 HYPERKALEMIA: ICD-10-CM

## 2020-03-03 DIAGNOSIS — G25.71 DRUG INDUCED AKATHISIA: ICD-10-CM

## 2020-03-03 DIAGNOSIS — Z23 ENCOUNTER FOR IMMUNIZATION: ICD-10-CM

## 2020-03-03 DIAGNOSIS — F20.1 DISORGANIZED SCHIZOPHRENIA: ICD-10-CM

## 2020-03-03 DIAGNOSIS — F14.251: ICD-10-CM

## 2020-03-03 DIAGNOSIS — R11.2 NAUSEA WITH VOMITING, UNSPECIFIED: ICD-10-CM

## 2020-03-03 DIAGNOSIS — R26.81 UNSTEADINESS ON FEET: ICD-10-CM

## 2020-03-03 DIAGNOSIS — D47.2 MONOCLONAL GAMMOPATHY: ICD-10-CM

## 2020-03-03 DIAGNOSIS — F12.99 CANNABIS USE, UNSPECIFIED WITH UNSPECIFIED CANNABIS-INDUCED DISORDER: ICD-10-CM

## 2020-03-03 DIAGNOSIS — E11.22 TYPE 2 DIABETES MELLITUS WITH DIABETIC CHRONIC KIDNEY DISEASE: ICD-10-CM
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
## 2020-03-03 DIAGNOSIS — Z21 ASYMPTOMATIC HUMAN IMMUNODEFICIENCY VIRUS [HIV] INFECTION STATUS: ICD-10-CM

## 2020-03-03 DIAGNOSIS — Z79.84 LONG TERM (CURRENT) USE OF ORAL HYPOGLYCEMIC DRUGS: ICD-10-CM

## 2020-03-03 DIAGNOSIS — I12.9 HYPERTENSIVE CHRONIC KIDNEY DISEASE WITH STAGE 1 THROUGH STAGE 4 CHRONIC KIDNEY DISEASE, OR UNSPECIFIED CHRONIC KIDNEY DISEASE: ICD-10-CM

## 2020-03-03 DIAGNOSIS — E87.1 HYPO-OSMOLALITY AND HYPONATREMIA: ICD-10-CM

## 2020-03-03 SDOH — ECONOMIC STABILITY - INCOME SECURITY: UNEMPLOYMENT, UNSPECIFIED: Z56.0

## 2020-06-26 ENCOUNTER — OUTPATIENT (OUTPATIENT)
Dept: OUTPATIENT SERVICES | Facility: HOSPITAL | Age: 65
LOS: 1 days | Discharge: HOME | End: 2020-06-26

## 2020-06-26 ENCOUNTER — ASOB RESULT (OUTPATIENT)
Age: 65
End: 2020-06-26

## 2020-06-26 ENCOUNTER — APPOINTMENT (OUTPATIENT)
Dept: ANTEPARTUM | Facility: CLINIC | Age: 65
End: 2020-06-26
Payer: MEDICAID

## 2020-06-26 DIAGNOSIS — R10.2 PELVIC AND PERINEAL PAIN: ICD-10-CM

## 2020-06-26 DIAGNOSIS — N83.209 UNSPECIFIED OVARIAN CYST, UNSPECIFIED SIDE: ICD-10-CM

## 2020-06-26 PROCEDURE — 76830 TRANSVAGINAL US NON-OB: CPT | Mod: 26

## 2020-07-01 ENCOUNTER — APPOINTMENT (OUTPATIENT)
Dept: ANTEPARTUM | Facility: CLINIC | Age: 65
End: 2020-07-01

## 2020-08-10 NOTE — ED ADULT TRIAGE NOTE - AS O2 DELIVERY
omeprazole (PRILOSEC) 40 MG capsule 90 capsule 3 8/10/2020     Sig - Route: Take 1 capsule by mouth daily. - Oral    Sent to pharmacy as: Omeprazole 40 MG Oral Capsule Delayed Release (PriLOSEC)    Class: Eprescribe    E-Prescribing Status: Receipt confirmed by pharmacy (8/10/2020  2:51 PM CDT)        
room air

## 2020-09-08 ENCOUNTER — EMERGENCY (EMERGENCY)
Facility: HOSPITAL | Age: 65
LOS: 0 days | Discharge: HOME | End: 2020-09-08
Attending: EMERGENCY MEDICINE | Admitting: EMERGENCY MEDICINE
Payer: MEDICAID

## 2020-09-08 VITALS — OXYGEN SATURATION: 94 % | RESPIRATION RATE: 20 BRPM

## 2020-09-08 VITALS
TEMPERATURE: 98 F | RESPIRATION RATE: 17 BRPM | SYSTOLIC BLOOD PRESSURE: 144 MMHG | OXYGEN SATURATION: 96 % | DIASTOLIC BLOOD PRESSURE: 74 MMHG | HEART RATE: 75 BPM

## 2020-09-08 DIAGNOSIS — R05 COUGH: ICD-10-CM

## 2020-09-08 DIAGNOSIS — T59.811A TOXIC EFFECT OF SMOKE, ACCIDENTAL (UNINTENTIONAL), INITIAL ENCOUNTER: ICD-10-CM

## 2020-09-08 DIAGNOSIS — F17.200 NICOTINE DEPENDENCE, UNSPECIFIED, UNCOMPLICATED: ICD-10-CM

## 2020-09-08 DIAGNOSIS — E11.9 TYPE 2 DIABETES MELLITUS WITHOUT COMPLICATIONS: ICD-10-CM

## 2020-09-08 DIAGNOSIS — E78.00 PURE HYPERCHOLESTEROLEMIA, UNSPECIFIED: ICD-10-CM

## 2020-09-08 DIAGNOSIS — I10 ESSENTIAL (PRIMARY) HYPERTENSION: ICD-10-CM

## 2020-09-08 LAB
ALBUMIN SERPL ELPH-MCNC: 3.3 G/DL — LOW (ref 3.5–5.2)
ALP SERPL-CCNC: 79 U/L — SIGNIFICANT CHANGE UP (ref 30–115)
ALT FLD-CCNC: 28 U/L — SIGNIFICANT CHANGE UP (ref 0–41)
ANION GAP SERPL CALC-SCNC: 9 MMOL/L — SIGNIFICANT CHANGE UP (ref 7–14)
APAP SERPL-MCNC: <5 UG/ML — LOW (ref 10–30)
AST SERPL-CCNC: 83 U/L — HIGH (ref 0–41)
BASE EXCESS BLDV CALC-SCNC: 0.3 MMOL/L — SIGNIFICANT CHANGE UP (ref -2–2)
BASOPHILS # BLD AUTO: 0.01 K/UL — SIGNIFICANT CHANGE UP (ref 0–0.2)
BASOPHILS NFR BLD AUTO: 0.2 % — SIGNIFICANT CHANGE UP (ref 0–1)
BILIRUB SERPL-MCNC: 0.4 MG/DL — SIGNIFICANT CHANGE UP (ref 0.2–1.2)
BUN SERPL-MCNC: 21 MG/DL — HIGH (ref 10–20)
CA-I SERPL-SCNC: 1.29 MMOL/L — SIGNIFICANT CHANGE UP (ref 1.12–1.3)
CALCIUM SERPL-MCNC: 9.5 MG/DL — SIGNIFICANT CHANGE UP (ref 8.5–10.1)
CHLORIDE SERPL-SCNC: 103 MMOL/L — SIGNIFICANT CHANGE UP (ref 98–110)
CO2 SERPL-SCNC: 20 MMOL/L — SIGNIFICANT CHANGE UP (ref 17–32)
CREAT SERPL-MCNC: 1.2 MG/DL — SIGNIFICANT CHANGE UP (ref 0.7–1.5)
EOSINOPHIL # BLD AUTO: 0 K/UL — SIGNIFICANT CHANGE UP (ref 0–0.7)
EOSINOPHIL NFR BLD AUTO: 0 % — SIGNIFICANT CHANGE UP (ref 0–8)
ETHANOL SERPL-MCNC: <10 MG/DL — SIGNIFICANT CHANGE UP
GAS PNL BLDV: 143 MMOL/L — SIGNIFICANT CHANGE UP (ref 136–145)
GAS PNL BLDV: SIGNIFICANT CHANGE UP
GLUCOSE SERPL-MCNC: 137 MG/DL — HIGH (ref 70–99)
HCO3 BLDV-SCNC: 26 MMOL/L — SIGNIFICANT CHANGE UP (ref 22–29)
HCT VFR BLD CALC: 37.8 % — SIGNIFICANT CHANGE UP (ref 37–47)
HCT VFR BLDA CALC: 36.1 % — SIGNIFICANT CHANGE UP (ref 34–44)
HGB BLD CALC-MCNC: 11.8 G/DL — LOW (ref 14–18)
HGB BLD-MCNC: 12 G/DL — SIGNIFICANT CHANGE UP (ref 12–16)
IMM GRANULOCYTES NFR BLD AUTO: 0.7 % — HIGH (ref 0.1–0.3)
LACTATE BLDV-MCNC: 1.9 MMOL/L — HIGH (ref 0.5–1.6)
LYMPHOCYTES # BLD AUTO: 1.07 K/UL — LOW (ref 1.2–3.4)
LYMPHOCYTES # BLD AUTO: 18.6 % — LOW (ref 20.5–51.1)
MCHC RBC-ENTMCNC: 28.9 PG — SIGNIFICANT CHANGE UP (ref 27–31)
MCHC RBC-ENTMCNC: 31.7 G/DL — LOW (ref 32–37)
MCV RBC AUTO: 91.1 FL — SIGNIFICANT CHANGE UP (ref 81–99)
MONOCYTES # BLD AUTO: 0.37 K/UL — SIGNIFICANT CHANGE UP (ref 0.1–0.6)
MONOCYTES NFR BLD AUTO: 6.4 % — SIGNIFICANT CHANGE UP (ref 1.7–9.3)
NEUTROPHILS # BLD AUTO: 4.27 K/UL — SIGNIFICANT CHANGE UP (ref 1.4–6.5)
NEUTROPHILS NFR BLD AUTO: 74.1 % — SIGNIFICANT CHANGE UP (ref 42.2–75.2)
NRBC # BLD: 0 /100 WBCS — SIGNIFICANT CHANGE UP (ref 0–0)
PCO2 BLDV: 46 MMHG — SIGNIFICANT CHANGE UP (ref 41–51)
PH BLDV: 7.36 — SIGNIFICANT CHANGE UP (ref 7.26–7.43)
PLATELET # BLD AUTO: 259 K/UL — SIGNIFICANT CHANGE UP (ref 130–400)
PO2 BLDV: 5 MMHG — LOW (ref 20–40)
POTASSIUM BLDV-SCNC: 5.2 MMOL/L — SIGNIFICANT CHANGE UP (ref 3.3–5.6)
POTASSIUM SERPL-MCNC: SIGNIFICANT CHANGE UP MMOL/L (ref 3.5–5)
POTASSIUM SERPL-SCNC: SIGNIFICANT CHANGE UP MMOL/L (ref 3.5–5)
PROT SERPL-MCNC: 8.5 G/DL — HIGH (ref 6–8)
RBC # BLD: 4.15 M/UL — LOW (ref 4.2–5.4)
RBC # FLD: 15.5 % — HIGH (ref 11.5–14.5)
SALICYLATES SERPL-MCNC: <0.3 MG/DL — LOW (ref 4–30)
SAO2 % BLDV: 6 % — SIGNIFICANT CHANGE UP
SODIUM SERPL-SCNC: 132 MMOL/L — LOW (ref 135–146)
TROPONIN T SERPL-MCNC: <0.01 NG/ML — SIGNIFICANT CHANGE UP
WBC # BLD: 5.76 K/UL — SIGNIFICANT CHANGE UP (ref 4.8–10.8)
WBC # FLD AUTO: 5.76 K/UL — SIGNIFICANT CHANGE UP (ref 4.8–10.8)

## 2020-09-08 PROCEDURE — 71045 X-RAY EXAM CHEST 1 VIEW: CPT | Mod: 26

## 2020-09-08 PROCEDURE — 99231 SBSQ HOSP IP/OBS SF/LOW 25: CPT

## 2020-09-08 PROCEDURE — 99285 EMERGENCY DEPT VISIT HI MDM: CPT
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
## 2020-09-08 NOTE — ED PROVIDER NOTE - CARE PROVIDER_API CALL
Mayra Morocho)  Internal Medicine  [address] [place] [id number]  Phone: [phone]  Fax: [phone]  Follow Up Time: 1-3 Days
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
## 2020-09-08 NOTE — ED ADULT NURSE NOTE - NSIMPLEMENTINTERV_GEN_ALL_ED
Implemented All Fall Risk Interventions:  [place] to call system. Call bell, personal items and telephone within reach. Instruct patient to call for assistance. Room bathroom lighting operational. Non-slip footwear when patient is off stretcher. Physically safe environment: no spills, clutter or unnecessary equipment. Stretcher in lowest position, wheels locked, appropriate side rails in place. Provide visual cue, wrist band, yellow gown, etc. Monitor gait and stability. Monitor for mental status changes and reorient to person, place, and time. Review medications for side effects contributing to fall risk. Reinforce activity limits and safety measures with patient and family.

## 2020-09-08 NOTE — ED PROVIDER NOTE - PHYSICAL EXAMINATION
CONSTITUTIONAL: cachectic, nontoxic appearing, in no acute distress, speaking in full sentences  SKIN: warm, dry, no rash, cap refill < 2 seconds  HEENT: normocephalic, atraumatic, no conjunctival erythema, moist mucous membranes, patent airway, diffuse white plaques inside mouth, no singing of nose hairs/eye brows, no soot in mouth, no oropharyngeal swelling, normal phonation, no drooling  NECK: supple  CV:  regular rate, regular rhythm, 2+ radial pulses bilaterally  RESP: no wheezes, no rales, no rhonchi, normal work of breathing  ABD: soft, nontender, nondistended, no rebound, no guarding  MSK: normal ROM, no cyanosis, no edema  NEURO: alert, oriented, grossly unremarkable  PSYCH: cooperative, appropriate

## 2020-09-08 NOTE — ED PROVIDER NOTE - PATIENT PORTAL LINK FT
You can access the FollowMyHealth Patient Portal offered by [facility] by registering at the following website: [email or web address]. By joining [facility]’s FollowMyHealth portal, you will also be able to view your health information using other applications (apps) compatible with our system.
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
## 2020-09-08 NOTE — ED PROVIDER NOTE - NS ED ROS FT
GEN:  no fever, no chills, no generalized weakness  NEURO:  no headache, no dizziness  ENT: no sore throat, no runny nose  CV:  no chest pain, no palpitations  RESP:  no sob, + cough  GI:  no nausea, no vomiting, no abdominal pain, no diarrhea  :  no dysuria, no urinary frequency, no hematuria  MSK:  no joint pain, no edema  SKIN:  no rash, no bruising  HEME: no hematochezia, no melena
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
## 2020-09-18 ENCOUNTER — INPATIENT (INPATIENT)
Facility: HOSPITAL | Age: 65
LOS: 6 days | Discharge: NURSING, REHAB OR LONG-TERM CARE | End: 2020-09-25
Attending: SPECIALIST | Admitting: SPECIALIST
Payer: MEDICAID

## 2020-09-18 VITALS
SYSTOLIC BLOOD PRESSURE: 144 MMHG | TEMPERATURE: 98 F | OXYGEN SATURATION: 95 % | HEIGHT: 64 IN | WEIGHT: 115.08 LBS | HEART RATE: 98 BPM | DIASTOLIC BLOOD PRESSURE: 87 MMHG | RESPIRATION RATE: 15 BRPM

## 2020-09-18 LAB
ALBUMIN SERPL ELPH-MCNC: 3.2 G/DL — LOW (ref 3.5–5.2)
ALP SERPL-CCNC: 134 U/L — HIGH (ref 30–115)
ALT FLD-CCNC: 25 U/L — SIGNIFICANT CHANGE UP (ref 0–41)
ANION GAP SERPL CALC-SCNC: 12 MMOL/L — SIGNIFICANT CHANGE UP (ref 7–14)
AST SERPL-CCNC: 41 U/L — SIGNIFICANT CHANGE UP (ref 0–41)
BASOPHILS # BLD AUTO: 0.03 K/UL — SIGNIFICANT CHANGE UP (ref 0–0.2)
BASOPHILS NFR BLD AUTO: 0.4 % — SIGNIFICANT CHANGE UP (ref 0–1)
BILIRUB SERPL-MCNC: 0.2 MG/DL — SIGNIFICANT CHANGE UP (ref 0.2–1.2)
BUN SERPL-MCNC: 29 MG/DL — HIGH (ref 10–20)
CALCIUM SERPL-MCNC: 9.6 MG/DL — SIGNIFICANT CHANGE UP (ref 8.5–10.1)
CHLORIDE SERPL-SCNC: 106 MMOL/L — SIGNIFICANT CHANGE UP (ref 98–110)
CO2 SERPL-SCNC: 22 MMOL/L — SIGNIFICANT CHANGE UP (ref 17–32)
CREAT SERPL-MCNC: 1.3 MG/DL — SIGNIFICANT CHANGE UP (ref 0.7–1.5)
EOSINOPHIL # BLD AUTO: 0.01 K/UL — SIGNIFICANT CHANGE UP (ref 0–0.7)
EOSINOPHIL NFR BLD AUTO: 0.1 % — SIGNIFICANT CHANGE UP (ref 0–8)
GLUCOSE BLDC GLUCOMTR-MCNC: 124 MG/DL — HIGH (ref 70–99)
GLUCOSE BLDC GLUCOMTR-MCNC: 143 MG/DL — HIGH (ref 70–99)
GLUCOSE SERPL-MCNC: 97 MG/DL — SIGNIFICANT CHANGE UP (ref 70–99)
HCT VFR BLD CALC: 34.4 % — LOW (ref 37–47)
HGB BLD-MCNC: 11.2 G/DL — LOW (ref 12–16)
IMM GRANULOCYTES NFR BLD AUTO: 0.6 % — HIGH (ref 0.1–0.3)
LYMPHOCYTES # BLD AUTO: 2.52 K/UL — SIGNIFICANT CHANGE UP (ref 1.2–3.4)
LYMPHOCYTES # BLD AUTO: 35.8 % — SIGNIFICANT CHANGE UP (ref 20.5–51.1)
MCHC RBC-ENTMCNC: 29.6 PG — SIGNIFICANT CHANGE UP (ref 27–31)
MCHC RBC-ENTMCNC: 32.6 G/DL — SIGNIFICANT CHANGE UP (ref 32–37)
MCV RBC AUTO: 91 FL — SIGNIFICANT CHANGE UP (ref 81–99)
MONOCYTES # BLD AUTO: 0.65 K/UL — HIGH (ref 0.1–0.6)
MONOCYTES NFR BLD AUTO: 9.2 % — SIGNIFICANT CHANGE UP (ref 1.7–9.3)
NEUTROPHILS # BLD AUTO: 3.79 K/UL — SIGNIFICANT CHANGE UP (ref 1.4–6.5)
NEUTROPHILS NFR BLD AUTO: 53.9 % — SIGNIFICANT CHANGE UP (ref 42.2–75.2)
NRBC # BLD: 0 /100 WBCS — SIGNIFICANT CHANGE UP (ref 0–0)
PLATELET # BLD AUTO: 235 K/UL — SIGNIFICANT CHANGE UP (ref 130–400)
POTASSIUM SERPL-MCNC: 4.3 MMOL/L — SIGNIFICANT CHANGE UP (ref 3.5–5)
POTASSIUM SERPL-SCNC: 4.3 MMOL/L — SIGNIFICANT CHANGE UP (ref 3.5–5)
PROT SERPL-MCNC: 7.8 G/DL — SIGNIFICANT CHANGE UP (ref 6–8)
RAPID RVP RESULT: SIGNIFICANT CHANGE UP
RBC # BLD: 3.78 M/UL — LOW (ref 4.2–5.4)
RBC # FLD: 15.5 % — HIGH (ref 11.5–14.5)
SARS-COV-2 RNA SPEC QL NAA+PROBE: SIGNIFICANT CHANGE UP
SODIUM SERPL-SCNC: 140 MMOL/L — SIGNIFICANT CHANGE UP (ref 135–146)
TROPONIN T SERPL-MCNC: <0.01 NG/ML — SIGNIFICANT CHANGE UP
WBC # BLD: 7.04 K/UL — SIGNIFICANT CHANGE UP (ref 4.8–10.8)
WBC # FLD AUTO: 7.04 K/UL — SIGNIFICANT CHANGE UP (ref 4.8–10.8)

## 2020-09-18 PROCEDURE — 99218: CPT

## 2020-09-18 PROCEDURE — 71045 X-RAY EXAM CHEST 1 VIEW: CPT | Mod: 26

## 2020-09-18 PROCEDURE — 93010 ELECTROCARDIOGRAM REPORT: CPT

## 2020-09-18 RX ORDER — FOLIC ACID 0.8 MG
1 TABLET ORAL DAILY
Refills: 0 | Status: DISCONTINUED | OUTPATIENT
Start: 2020-09-18 | End: 2020-09-25

## 2020-09-18 RX ORDER — DOLUTEGRAVIR SODIUM 25 MG/1
50 TABLET, FILM COATED ORAL DAILY
Refills: 0 | Status: DISCONTINUED | OUTPATIENT
Start: 2020-09-18 | End: 2020-09-21

## 2020-09-18 RX ORDER — INFLUENZA VIRUS VACCINE 15; 15; 15; 15 UG/.5ML; UG/.5ML; UG/.5ML; UG/.5ML
0.5 SUSPENSION INTRAMUSCULAR ONCE
Refills: 0 | Status: COMPLETED | OUTPATIENT
Start: 2020-09-18 | End: 2020-09-18

## 2020-09-18 RX ORDER — PANTOPRAZOLE SODIUM 20 MG/1
40 TABLET, DELAYED RELEASE ORAL
Refills: 0 | Status: DISCONTINUED | OUTPATIENT
Start: 2020-09-18 | End: 2020-09-25

## 2020-09-18 RX ORDER — ENOXAPARIN SODIUM 100 MG/ML
40 INJECTION SUBCUTANEOUS ONCE
Refills: 0 | Status: DISCONTINUED | OUTPATIENT
Start: 2020-09-18 | End: 2020-09-18

## 2020-09-18 RX ORDER — RITONAVIR 100 MG/1
100 TABLET, FILM COATED ORAL EVERY 24 HOURS
Refills: 0 | Status: DISCONTINUED | OUTPATIENT
Start: 2020-09-18 | End: 2020-09-21

## 2020-09-18 RX ORDER — DARUNAVIR 75 MG/1
800 TABLET, FILM COATED ORAL DAILY
Refills: 0 | Status: DISCONTINUED | OUTPATIENT
Start: 2020-09-18 | End: 2020-09-21

## 2020-09-18 RX ORDER — ENOXAPARIN SODIUM 100 MG/ML
40 INJECTION SUBCUTANEOUS DAILY
Refills: 0 | Status: DISCONTINUED | OUTPATIENT
Start: 2020-09-18 | End: 2020-09-25

## 2020-09-18 RX ORDER — NYSTATIN 500MM UNIT
500000 POWDER (EA) MISCELLANEOUS
Refills: 0 | Status: DISCONTINUED | OUTPATIENT
Start: 2020-09-18 | End: 2020-09-25

## 2020-09-18 RX ORDER — THIAMINE MONONITRATE (VIT B1) 100 MG
100 TABLET ORAL DAILY
Refills: 0 | Status: DISCONTINUED | OUTPATIENT
Start: 2020-09-18 | End: 2020-09-25

## 2020-09-18 RX ORDER — CHLORHEXIDINE GLUCONATE 213 G/1000ML
1 SOLUTION TOPICAL ONCE
Refills: 0 | Status: COMPLETED | OUTPATIENT
Start: 2020-09-18 | End: 2020-09-18

## 2020-09-18 RX ORDER — AMLODIPINE BESYLATE 2.5 MG/1
10 TABLET ORAL ONCE
Refills: 0 | Status: COMPLETED | OUTPATIENT
Start: 2020-09-18 | End: 2020-09-21

## 2020-09-18 RX ORDER — METFORMIN HYDROCHLORIDE 850 MG/1
1 TABLET ORAL
Qty: 0 | Refills: 0 | DISCHARGE

## 2020-09-18 RX ORDER — SODIUM CHLORIDE 9 MG/ML
1000 INJECTION INTRAMUSCULAR; INTRAVENOUS; SUBCUTANEOUS ONCE
Refills: 0 | Status: COMPLETED | OUTPATIENT
Start: 2020-09-18 | End: 2020-09-18

## 2020-09-18 RX ORDER — FLUCONAZOLE 150 MG/1
200 TABLET ORAL DAILY
Refills: 0 | Status: DISCONTINUED | OUTPATIENT
Start: 2020-09-18 | End: 2020-09-25

## 2020-09-18 RX ORDER — ATORVASTATIN CALCIUM 80 MG/1
10 TABLET, FILM COATED ORAL AT BEDTIME
Refills: 0 | Status: DISCONTINUED | OUTPATIENT
Start: 2020-09-18 | End: 2020-09-25

## 2020-09-18 RX ADMIN — Medication 500000 UNIT(S): at 17:48

## 2020-09-18 RX ADMIN — SODIUM CHLORIDE 1000 MILLILITER(S): 9 INJECTION INTRAMUSCULAR; INTRAVENOUS; SUBCUTANEOUS at 06:25
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
## 2020-09-18 NOTE — ED PROVIDER NOTE - PHYSICAL EXAMINATION
GENERAL: NAD, disheveled appearance, chronically ill appearing  HEAD:  Atraumatic, Normocephalic  HEENT: poor dentition  NECK: Supple, No JVD  CHEST/LUNG: Clear to auscultation bilaterally; No wheeze; No crackles; No accessory muscles used  HEART: Regular rate and rhythm; No murmurs  ABDOMEN: Soft, Nontender, Nondistended; Bowel sounds present; No guarding  EXTREMITIES:  2+ Peripheral Pulses, No cyanosis or edema  PSYCH: AAOx3, poor concentration  NEUROLOGY: non-focal
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
## 2020-09-18 NOTE — ED CDU PROVIDER INITIAL DAY NOTE - PHYSICAL EXAMINATION
CONST: cachectic, chronically ill appearing.   EYES: Sclera and conjunctiva clear.  NECK: Non-tender, no meningeal signs,   CARD: Normal S1 S2; Normal rate and rhythm  RESP: Equal BS B/L, No wheezes, rhonchi or rales. No distress  GI: Soft, non-tender, non-distended.  MS: Normal ROM in all extremities. No edema of lower extremities, no calf pain, radial pulses 2+ bilaterally  SKIN: Warm, dry, no acute rashes. Good turgor  NEURO: A&Ox3, No focal deficits. Strength 5/5 with no sensory deficits
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
## 2020-09-18 NOTE — H&P ADULT - NSHPPHYSICALEXAM_GEN_ALL_CORE
PHYSICAL EXAM:  GENERAL: NAD, disheveled appearance, chronically ill appearing  HEAD:  Atraumatic, Normocephalic  HEENT: +oral thrush, poor dentition  NECK: Supple, No JVD  CHEST/LUNG: Clear to auscultation bilaterally; No wheeze; No crackles; No accessory muscles used  HEART: Regular rate and rhythm; No murmurs  ABDOMEN: Soft, Nontender, Nondistended; Bowel sounds present; No guarding, loose skin consistent w/ significant weight loss  EXTREMITIES:  2+ Peripheral Pulses, No cyanosis or edema  PSYCH: AAOx3, emotionally labile, poor concentration  NEUROLOGY: non-focal
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
## 2020-09-18 NOTE — ED ADULT NURSE REASSESSMENT NOTE - NS ED NURSE REASSESS COMMENT FT1
prior to patient being discharged she had a near syncope episode when she tried to stand up from the chair. pt denies any chest pain, denies shortness of breath. denies dizziness. MD is aware. pt placed in bed and cardiac monitoring. Will continue to monitor.

## 2020-09-18 NOTE — H&P ADULT - NSHPLABSRESULTS_GEN_ALL_CORE
11.2   7.04  )-----------( 235      ( 18 Sep 2020 05:00 )             34.4     09-18    140  |  106  |  29<H>  ----------------------------<  97  4.3   |  22  |  1.3    Ca    9.6      18 Sep 2020 05:00    TPro  7.8  /  Alb  3.2<L>  /  TBili  0.2  /  DBili  x   /  AST  41  /  ALT  25  /  AlkPhos  134<H>  09-18          Lactate Trend      CARDIAC MARKERS ( 18 Sep 2020 05:00 )  x     / <0.01 ng/mL / x     / x     / x            CAPILLARY BLOOD GLUCOSE      POCT Blood Glucose.: 124 mg/dL (18 Sep 2020 15:23)      Xray Chest 1 View AP/PA (09.18.20 @ 05:41)     Impression:    No radiographic evidence of acute cardiopulmonary disease.    `< from: 12 Lead ECG (09.18.20 @ 10:39) >    Diagnosis Line Normal sinus rhythm  Possible Left atrial enlargement  Nonspecific T wave abnormality  Abnormal ECG

## 2020-09-18 NOTE — ED PROVIDER NOTE - NS ED ROS FT
Review of Systems:  CONSTITUTIONAL - No fever  SKIN - No rash  HEMATOLOGIC - No abnormal bleeding or bruising  RESPIRATORY - No shortness of breath, No cough  CARDIAC -No chest pain, No palpitations  GI - No abdominal pain, No nausea, No vomiting  MUSCULOSKELETAL - No joint paint, No swelling  NEUROLOGIC - No focal weakness  All other systems negative, unless specified in HPI
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
## 2020-09-18 NOTE — ED PROVIDER NOTE - PATIENT PORTAL LINK FT
You can access the FollowMyHealth Patient Portal offered by [facility] by registering at the following website: [email or web address]. By joining [facility]’s FollowMyHealth portal, you will also be able to view your health information using other applications (apps) compatible with our system.

## 2020-09-18 NOTE — ED ADULT NURSE NOTE - PMH
Diabetes    High cholesterol    HIV (human immunodeficiency virus infection)    HTN (hypertension)
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
## 2020-09-18 NOTE — ED CDU PROVIDER INITIAL DAY NOTE - NS ED ROS FT
Constitutional: See HPI.  Eyes: No visual changes, eye pain or discharge.   ENMT: No hearing changes, pain, discharge or infections.   Cardiac: + syncope. No SOB or edema. No chest pain with exertion.  Respiratory: No cough or respiratory distress.   GI: + diarrhea. No nausea, vomiting,  or abdominal pain.  : No dysuria, frequency or burning. No Discharge  MS: No myalgia, muscle weakness, joint pain or back pain.  Neuro: No headache or weakness.   Skin: No skin rash.  Except as documented in the HPI, all other systems are negative.
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
## 2020-09-18 NOTE — ED PROVIDER NOTE - NSFOLLOWUPINSTRUCTIONS_ED_ALL_ED_FT
Substance Use Disorder      Substance use disorder occurs when a person's repeated use of drugs or alcohol interferes with his or her ability to be productive. This disorder can cause problems with mental and physical health. It can affect your ability to have healthy relationships, and it can keep you from being able to meet your responsibilities at work, home, or school. It can also lead to addiction, which is a condition in which the person cannot stop using the substance consistently for a period of time.    Addiction changes the way the brain works. Because of these changes, addiction is a chronic condition. Substance use disorder can be mild, moderate, or severe.  The most commonly abused substances include:  •Alcohol.      •Tobacco.      •Marijuana.      •Stimulants, such as cocaine and methamphetamine.      •Hallucinogens, such as LSD and PCP.      •Opioids, such as some prescription pain medicines and heroin.        What are the causes?  This condition may develop due to many complex social, psychological, or physical reasons, such as:  •Stress.      •Abuse.      •Peer pressure.      •Anxiety or depression.        What increases the risk?  This condition is more likely to develop in people who:  •Use substances to cope with stress.      •Have been abused.      •Have a mental health disorder, such as depression.      •Have a family history of substance use disorder.        What are the signs or symptoms?  Symptoms of this condition include:  •Using the substance for longer periods of time or at a higher dosage than what is normal or intended.      •Having a lasting desire to use the substance.      •Being unable to slow down or stop the use of the substance.      •Spending an abnormal amount of time getting the substance, using the substance, or recovering from using the substance.      •Using the substance in a way that interferes with work, school, social activities, and personal relationships.    •Using the substance even after having negative consequences, such as:  •Health problems.      •Legal or financial troubles.      •Job loss.      •Relationship problems.        •Needing more and more of the substance to get the same effect (developing tolerance).      •Experiencing unpleasant symptoms if you do not use the substance (withdrawal).      •Using the substance to avoid withdrawal symptoms.        How is this diagnosed?  This condition may be diagnosed based on:  •A physical exam.      •Your history of substance use.    •Your symptoms. This includes:  •How substance use affects your life.      •Changes in personality, behaviors, and mood.      •Having at least two symptoms of substance use disorder within a 12-month period.      •Health issues related to substance use, such as liver damage, shortness of breath, fatigue, cough, or heart problems.        •Blood or urine tests to screen for alcohol and drugs.        How is this treated?  This condition may be treated by:  •Stopping substance use safely. This may require taking medicines and being closely monitored for several days.      •Taking part in group and individual counseling from mental health providers who help people with substance use disorder.      •Staying at a live-in (residential) treatment center for several days or weeks.      •Attending daily counseling sessions at a treatment center.    •Taking medicine as told by your health care provider:  •To ease symptoms and prevent complications during withdrawal.      •To treat other mental health issues, such as depression or anxiety.      •To block cravings by causing the same effects as the substance.      •To block the effects of the substance or replace good sensations with unpleasant ones.        •Participating in a support group to share your experience with others who are going through the same thing. These groups are an important part of long-term recovery for many people.      Recovery can be a long process. Many people who undergo treatment start using the substance again after stopping (relapse). If you relapse, that does not mean that treatment will not work.      Follow these instructions at home:     •Take over-the-counter and prescription medicines only as told by your health care provider.      • Do not use any drugs or alcohol.      •Avoid temptations or triggers that you associate with your use of the substance.      •Learn and practice techniques for managing stress.      •Have a plan for vulnerable moments. Get phone numbers of people who are willing to help and who are committed to your recovery.      •Attend support groups on a regular basis. These groups include 12-step programs like Alcoholics Anonymous and Narcotics Anonymous.      •Keep all follow-up visits as told by your health care providers. This is important. This includes continuing to work with therapists and support groups.        Contact a health care provider if:    •You cannot take your medicines as told.      •Your symptoms get worse.      •You have trouble resisting the urge to use drugs or alcohol.        Get help right away if you:    •Relapse.      •Think that you may have taken too much of a drug. The hotline of the National Poison Control Center is (187) 674-2232.    •Have signs of an overdose. Symptoms include:  •Chest pain.      •Confusion.      •Sleepiness or difficulty staying awake.      •Slowed breathing.      •Nausea or vomiting.      •A seizure.        •Have serious thoughts about hurting yourself or someone else.      Drug overdose is an emergency. Do not wait to see if the symptoms will go away. Get medical help right away. Call your local emergency services (911 in the [place] Do not drive yourself to the hospital.   If you ever feel like you may hurt yourself or others, or have thoughts about taking your own life, get help right away. You can go to your nearest emergency department or call:   • Your local emergency services (911 in the [place]        • A suicide crisis helpline, such as the National Suicide Prevention Lifeline at 1-161.487.2749. This is open 24 hours a day.         Summary    •Substance use disorder occurs when a person's repeated use of drugs or alcohol interferes with his or her ability to be productive.      •Taking part in group and individual counseling from mental health providers is a common treatment for people with substance use disorder.      •Recovery can be a long process. Many people who undergo treatment start using the substance again after stopping (relapse). A relapse does not mean that treatment will not work.      •Attend support groups such as Alcoholics Anonymous and Narcotics Anonymous. These groups are an important part of long-term recovery for many people.      This information is not intended to replace advice given to you by your health care provider. Make sure you discuss any questions you have with your health care provider.

## 2020-09-19 DIAGNOSIS — F20.9 SCHIZOPHRENIA, UNSPECIFIED: ICD-10-CM

## 2020-09-19 LAB
ALBUMIN SERPL ELPH-MCNC: 2.6 G/DL — LOW (ref 3.5–5.2)
ALP SERPL-CCNC: 108 U/L — SIGNIFICANT CHANGE UP (ref 30–115)
ALT FLD-CCNC: 20 U/L — SIGNIFICANT CHANGE UP (ref 0–41)
ANION GAP SERPL CALC-SCNC: 8 MMOL/L — SIGNIFICANT CHANGE UP (ref 7–14)
AST SERPL-CCNC: 33 U/L — SIGNIFICANT CHANGE UP (ref 0–41)
BILIRUB SERPL-MCNC: 0.7 MG/DL — SIGNIFICANT CHANGE UP (ref 0.2–1.2)
BUN SERPL-MCNC: 16 MG/DL — SIGNIFICANT CHANGE UP (ref 10–20)
CALCIUM SERPL-MCNC: 8.4 MG/DL — LOW (ref 8.5–10.1)
CHLORIDE SERPL-SCNC: 106 MMOL/L — SIGNIFICANT CHANGE UP (ref 98–110)
CO2 SERPL-SCNC: 23 MMOL/L — SIGNIFICANT CHANGE UP (ref 17–32)
CREAT SERPL-MCNC: 0.9 MG/DL — SIGNIFICANT CHANGE UP (ref 0.7–1.5)
GLUCOSE BLDC GLUCOMTR-MCNC: 137 MG/DL — HIGH (ref 70–99)
GLUCOSE BLDC GLUCOMTR-MCNC: 144 MG/DL — HIGH (ref 70–99)
GLUCOSE BLDC GLUCOMTR-MCNC: 148 MG/DL — HIGH (ref 70–99)
GLUCOSE SERPL-MCNC: 99 MG/DL — SIGNIFICANT CHANGE UP (ref 70–99)
HCT VFR BLD CALC: 35 % — LOW (ref 37–47)
HGB BLD-MCNC: 11.1 G/DL — LOW (ref 12–16)
MAGNESIUM SERPL-MCNC: 1.4 MG/DL — LOW (ref 1.8–2.4)
MCHC RBC-ENTMCNC: 28.6 PG — SIGNIFICANT CHANGE UP (ref 27–31)
MCHC RBC-ENTMCNC: 31.7 G/DL — LOW (ref 32–37)
MCV RBC AUTO: 90.2 FL — SIGNIFICANT CHANGE UP (ref 81–99)
NRBC # BLD: 0 /100 WBCS — SIGNIFICANT CHANGE UP (ref 0–0)
PLATELET # BLD AUTO: 201 K/UL — SIGNIFICANT CHANGE UP (ref 130–400)
POTASSIUM SERPL-MCNC: 4.5 MMOL/L — SIGNIFICANT CHANGE UP (ref 3.5–5)
POTASSIUM SERPL-SCNC: 4.5 MMOL/L — SIGNIFICANT CHANGE UP (ref 3.5–5)
PROT SERPL-MCNC: 6.3 G/DL — SIGNIFICANT CHANGE UP (ref 6–8)
RBC # BLD: 3.88 M/UL — LOW (ref 4.2–5.4)
RBC # FLD: 15.3 % — HIGH (ref 11.5–14.5)
SODIUM SERPL-SCNC: 137 MMOL/L — SIGNIFICANT CHANGE UP (ref 135–146)
WBC # BLD: 5.62 K/UL — SIGNIFICANT CHANGE UP (ref 4.8–10.8)
WBC # FLD AUTO: 5.62 K/UL — SIGNIFICANT CHANGE UP (ref 4.8–10.8)

## 2020-09-19 RX ADMIN — FLUCONAZOLE 200 MILLIGRAM(S): 150 TABLET ORAL at 11:57

## 2020-09-19 RX ADMIN — DARUNAVIR 800 MILLIGRAM(S): 75 TABLET, FILM COATED ORAL at 11:55

## 2020-09-19 RX ADMIN — Medication 100 MILLIGRAM(S): at 11:55

## 2020-09-19 RX ADMIN — Medication 500000 UNIT(S): at 11:56

## 2020-09-19 RX ADMIN — ATORVASTATIN CALCIUM 10 MILLIGRAM(S): 80 TABLET, FILM COATED ORAL at 21:03

## 2020-09-19 RX ADMIN — Medication 500000 UNIT(S): at 23:57

## 2020-09-19 RX ADMIN — Medication 1 MILLIGRAM(S): at 11:55

## 2020-09-19 RX ADMIN — RITONAVIR 100 MILLIGRAM(S): 100 TABLET, FILM COATED ORAL at 11:55

## 2020-09-19 RX ADMIN — Medication 500000 UNIT(S): at 17:00

## 2020-09-19 RX ADMIN — DOLUTEGRAVIR SODIUM 50 MILLIGRAM(S): 25 TABLET, FILM COATED ORAL at 11:56
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
## 2020-09-19 NOTE — BEHAVIORAL HEALTH ASSESSMENT NOTE - SUMMARY
Patient is 64-year-old female domiciled in private residence with 27-year-old grandson, with, and PPH of Schizophrenia (as per daughter),  PMH of HIV, HTN, DM multiple IPP admissions (most at [facility]; most recently in February at [facility]), history of crack cocaine and nicotine use disorders, and current use of alcohol and marijuana, who was admitted for recurrent episodes of syncope/near syncope, weight loss, diarrhea and erratic behavior. Psychiatry was consulted for medication recommendations in relation to the patient's psychiatric history.     On evaluation today, the patient is visibly cachectic, disorganized and agitated in behavior, with possible internal preoccupation. Given that the patient has been off her psychiatric medication and possibly off her medical medications, it is likely that the patient is experiencing either a decompensation in mental status secondary to medication nonadherence or delirium secondary to possible AIDS-related medical complications. Patient does not warrant IPP admission at this time, but further medical workup and management is necessary to address the underlying causes of her acute presentation.  Based on the patient's preference, she may be more willing to take Seroquel than haldol. Psychiatry recommends the following to manage the patient's psychiatric symptoms:    #AMS  - Recommend Seroquel 50 mg PO Nightly with possible increase based on response and tolerability up to 100 mg PO nightly.  - Recommend benztropine 0.5 mg PO BID  - Recommend avoiding benzodiazepines as they are associated with increased mortality in elderly patients, increased risk of acute delirium, and possible paradoxical agitation    #Agitation  For severe agitation not responding to behavioral intervention, may give haldol 2.5 mg po q6h prn, hydroxyzine 50 mg po q6h prn, with escalation to IM if patient refusing PO and remains an imminent danger to self or others. If IM antipsychotic is administered, please perform follow-up ECG for QTc monitoring.

## 2020-09-19 NOTE — BEHAVIORAL HEALTH ASSESSMENT NOTE - OTHER
cachectic did not assess fluctuated between normal to fast and difficult to interrupt appeared at times to be internally preoccupied unable to truly assess Possible medical complications from AIDS

## 2020-09-19 NOTE — BEHAVIORAL HEALTH ASSESSMENT NOTE - NSBHCHARTREVIEWLAB_PSY_A_CORE FT
11.1   5.62  )-----------( 201      ( 19 Sep 2020 05:56 )             35.0   09-19    137  |  106  |  16  ----------------------------<  99  4.5   |  23  |  0.9    Ca    8.4<L>      19 Sep 2020 05:56  Mg     1.4     09-19    TPro  6.3  /  Alb  2.6<L>  /  TBili  0.7  /  DBili  x   /  AST  33  /  ALT  20  /  AlkPhos  108  09-19

## 2020-09-19 NOTE — BEHAVIORAL HEALTH ASSESSMENT NOTE - NS ED BHA MED ROS HEMATOLOGIC LYMPHATIC
Jeannine,     Verify with her that yes - she should repeat dose and that she should not be taking as oral medication, however, place all 4 tablets in vagina at one time.     Thanks   Dr. Lopez    ----- Message from Ken Alexander sent at 6/19/2017 10:12 AM EDT -----  Pt says dr lopez called her in pills to induce miscarriage and she took all 4 and nothing has happened. She said she does have a refill. Wants to know how to move forward.  Please call pt cell-[phone] (ok to leave message)     No complaints

## 2020-09-19 NOTE — BEHAVIORAL HEALTH ASSESSMENT NOTE - NSBHCONSULTFOLLOWAFTERCARE_PSY_A_CORE FT
Recommend following up with   [facility]   [address] [place] [address] [phone]

## 2020-09-19 NOTE — BEHAVIORAL HEALTH ASSESSMENT NOTE - NSBHCONSULTMEDS_PSY_A_CORE FT
Psychiatry recommends the following standing medications to manage the patient's psychiatric symptoms:    #AMS  - Recommend Seroquel 50 mg PO Nightly with possible increase based on response and tolerability up to 100 mg PO nightly.  - Recommend benztropine 0.5 mg PO BID
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
## 2020-09-19 NOTE — BEHAVIORAL HEALTH ASSESSMENT NOTE - RISK ASSESSMENT
Low Acute Suicide Risk Patient is at elevated chronic risk due to history of psychiatric illnesses, including Schizophrenia and substance use disorders requiring multiple prior IPP admissions. Risk is acutely elevated via her nonadherence to treatment plans and decline in ability to care for herself. Protective factors include family support, stable living.

## 2020-09-19 NOTE — BEHAVIORAL HEALTH ASSESSMENT NOTE - NSBHCHARTREVIEWINVESTIGATE_PSY_A_CORE FT
< from: 12 Lead ECG (09.18.20 @ 10:39) >  Ventricular Rate 83 BPM  Atrial Rate 83 BPM  P-R Interval 116 ms  QRS Duration 74 ms  Q-T Interval 370 ms  QTC Calculation(Bazett) 434 ms
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
## 2020-09-19 NOTE — BEHAVIORAL HEALTH ASSESSMENT NOTE - NSBHCONSULTMEDAGITATION_PSY_A_CORE FT
For severe agitation not responding to behavioral intervention, may give haldol 2.5 mg po q6h prn, hydroxyzine 50 mg po q6h prn, with escalation to IM if patient refusing PO and remains an imminent danger to self or others. If IM antipsychotic is administered, please perform follow-up ECG for QTc monitoring.

## 2020-09-19 NOTE — BEHAVIORAL HEALTH ASSESSMENT NOTE - SUICIDE RISK FACTORS
Unable to engage in safety planning/Alcohol/Substance abuse disorders/Psychotic disorder current/past

## 2020-09-19 NOTE — BEHAVIORAL HEALTH ASSESSMENT NOTE - DESCRIPTION (FIRST USE, LAST USE, QUANTITY, FREQUENCY, DURATION)
1 PPD since 13 Endorses drinking some liquor each day, but refused to give details refused to provide details Endorses use, but denies current usage

## 2020-09-19 NOTE — BEHAVIORAL HEALTH ASSESSMENT NOTE - NSBHMEDSOTHERFT_PSY_A_CORE
Per [place] Breeze, [phone], patient was on Haldol 10 mg PO QHS and Benztropine 0.5 mg PO BID as of 2/2020, but no psychiatric meds have been filled since March

## 2020-09-19 NOTE — BEHAVIORAL HEALTH ASSESSMENT NOTE - NSBHCHARTREVIEWVS_PSY_A_CORE FT
Vital Signs Last 24 Hrs  T(C): 36.7 (19 Sep 2020 03:42), Max: 37 (18 Sep 2020 17:09)  T(F): 98 (19 Sep 2020 03:42), Max: 98.6 (18 Sep 2020 17:09)  HR: 81 (19 Sep 2020 03:42) (76 - 81)  BP: 120/74 (19 Sep 2020 03:42) (120/74 - 165/67)  BP(mean): --  RR: 20 (19 Sep 2020 03:42) (18 - 20)  SpO2: 100% (18 Sep 2020 17:09) (99% - 100%)

## 2020-09-19 NOTE — BEHAVIORAL HEALTH ASSESSMENT NOTE - HPI (INCLUDE ILLNESS QUALITY, SEVERITY, DURATION, TIMING, CONTEXT, MODIFYING FACTORS, ASSOCIATED SIGNS AND SYMPTOMS)
Patient is 64-year-old female domiciled in private residence with 27-year-old grandson, with, and PPH of Schizophrenia (as per daughter),  PMH of HIV, HTN, DM multiple IPP admissions (most at [facility]; most recently in February at [facility]), history of crack cocaine and nicotine use disorders, and current use of alcohol and marijuana, who was admitted for recurrent episodes of syncope/near syncope, weight loss, diarrhea and erratic behavior. Psychiatry was consulted for medication recommendations in relation to the patient's psychiatric history.     On evaluation, the patient was seen sitting up in bed, in no acute physical distress. On approach to the patient, the patient asked the PCA the age of the writer, and if he looked old enough to be a physician or . When asked about the patient's mood, the patient reports feeling "real bitchy" lately, and stated that she intends to finish her GED in February, move to [place], then [place], then [place] to "work and get rid of all these freeloaders," which was in reference to her grandson with whom she lives with. She denied homicidal or suicidal ideation, and also denied hearing voices or sounds others cannot, or seeing things that may not be real or unusual.     When asked if she has been taking her psychiatric medications, the patient states "I don't need any of that anymore," and that the only medication that helped her in the past was "200 Seroquel." The patient did not know of any recent medications. She endorsed drinking alcohol and smoking cannabis, but when asked details, she became irritated and refused to ask more questions. She then became adamant that she be immediately discharged, and terminated the interview.     On reevaluation with a senior resident, the patient refused to speak to the writer, and told him to sit in the corner of the room and not look at patient. She reports having bipolar disorder. The patient became irate when asked if she had ever felt suicidal, stating "I had my first baby at 16, my second at 17, and my 3rd at 18. If I wanted to kill myself it would have been when I got pregnant the first time." The patient then asked the senior resident if she was a virgin, and stated her granddaughter "is 27 and still a virgin, but Seroquel helps me sleep" The patient became tangential, and terminated the interview.     Attempts to reach the patient's daughter, Mario, were unsuccessful.     Per [facility], the patient last filled Haldol 10 mg PO daily in March, and Benztropine 0.5 mg BID in March. The patient was last on Seroquel 100 mg PO Daily. Patient is 64-year-old female domiciled in private residence with 27-year-old grandson, with, and PPH of Schizophrenia (as per chart),  PMH of HIV, HTN, DM multiple IPP admissions (most at [facility]; most recently in February at [facility]), history of crack cocaine and nicotine use disorders, and current use of alcohol and marijuana, who was admitted for recurrent episodes of syncope/near syncope, weight loss, diarrhea and erratic behavior. Psychiatry was consulted for medication recommendations in relation to the patient's psychiatric history.     On evaluation, the patient was seen sitting up in bed, in no acute physical distress, appearing thin and poorly groomed. On approach to the patient, the patient asked the PCA the age of the writer, and if he was old enough to be a physician or . When asked about the patient's mood, the patient reports feeling "real bitchy" lately, and stated that she intends to finish her GED in February, move to [place], then [place], then [place] to "work and get rid of all these freeloaders," which was in reference to her grandson with whom she lives with. She denied homicidal or suicidal ideation, and also denied hearing voices or sounds others cannot, or seeing things that may not be real or unusual.     When asked if she has been taking her psychiatric medications, the patient states "I don't need any of that anymore," and that the only medication that helped her in the past was "200 Seroquel." The patient did not know of any recent medications. She endorsed drinking alcohol and smoking cannabis, but when asked details, she became irritated and refused to ask more questions. She then became adamant that she be immediately discharged, and terminated the interview.     On reevaluation with a senior resident, the patient refused to speak to the writer, and told him to sit in the corner of the room and not look at patient. She reports having bipolar disorder. The patient became irate when asked if she had ever felt suicidal, stating "I had my first baby at 16, my second at 17, and my 3rd at 18. If I wanted to kill myself it would have been when I got pregnant the first time." The patient then asked the senior resident if she was a virgin, and stated her granddaughter "is 27 and still a virgin, but Seroquel helps me sleep" The patient became tangential, and terminated the interview.     Attempts to reach the patient's daughter, Mario, were unsuccessful.     Per [facility], the patient last filled Haldol 10 mg PO daily in March, and Benztropine 0.5 mg BID in March. The patient was last on Seroquel 100 mg PO Daily.

## 2020-09-19 NOTE — BEHAVIORAL HEALTH ASSESSMENT NOTE - OTHER PAST PSYCHIATRIC HISTORY (INCLUDE DETAILS REGARDING ONSET, COURSE OF ILLNESS, INPATIENT/OUTPATIENT TREATMENT)
As per chart patient has long history of psychiatric illness with multiple IPP admissions, most recently at [facility] in February 2020

## 2020-09-20 LAB
ALBUMIN SERPL ELPH-MCNC: 2.6 G/DL — LOW (ref 3.5–5.2)
ALP SERPL-CCNC: 110 U/L — SIGNIFICANT CHANGE UP (ref 30–115)
ALT FLD-CCNC: 19 U/L — SIGNIFICANT CHANGE UP (ref 0–41)
ANION GAP SERPL CALC-SCNC: 9 MMOL/L — SIGNIFICANT CHANGE UP (ref 7–14)
AST SERPL-CCNC: 29 U/L — SIGNIFICANT CHANGE UP (ref 0–41)
BASOPHILS # BLD AUTO: 0.03 K/UL — SIGNIFICANT CHANGE UP (ref 0–0.2)
BASOPHILS NFR BLD AUTO: 0.6 % — SIGNIFICANT CHANGE UP (ref 0–1)
BILIRUB SERPL-MCNC: 0.2 MG/DL — SIGNIFICANT CHANGE UP (ref 0.2–1.2)
BUN SERPL-MCNC: 14 MG/DL — SIGNIFICANT CHANGE UP (ref 10–20)
CALCIUM SERPL-MCNC: 8.6 MG/DL — SIGNIFICANT CHANGE UP (ref 8.5–10.1)
CHLORIDE SERPL-SCNC: 102 MMOL/L — SIGNIFICANT CHANGE UP (ref 98–110)
CO2 SERPL-SCNC: 24 MMOL/L — SIGNIFICANT CHANGE UP (ref 17–32)
CREAT SERPL-MCNC: 1.1 MG/DL — SIGNIFICANT CHANGE UP (ref 0.7–1.5)
EOSINOPHIL # BLD AUTO: 0.02 K/UL — SIGNIFICANT CHANGE UP (ref 0–0.7)
EOSINOPHIL NFR BLD AUTO: 0.4 % — SIGNIFICANT CHANGE UP (ref 0–8)
GLUCOSE BLDC GLUCOMTR-MCNC: 128 MG/DL — HIGH (ref 70–99)
GLUCOSE BLDC GLUCOMTR-MCNC: 144 MG/DL — HIGH (ref 70–99)
GLUCOSE BLDC GLUCOMTR-MCNC: 147 MG/DL — HIGH (ref 70–99)
GLUCOSE SERPL-MCNC: 133 MG/DL — HIGH (ref 70–99)
HCT VFR BLD CALC: 35 % — LOW (ref 37–47)
HGB BLD-MCNC: 11 G/DL — LOW (ref 12–16)
IMM GRANULOCYTES NFR BLD AUTO: 0.6 % — HIGH (ref 0.1–0.3)
LYMPHOCYTES # BLD AUTO: 2.18 K/UL — SIGNIFICANT CHANGE UP (ref 1.2–3.4)
LYMPHOCYTES # BLD AUTO: 42.7 % — SIGNIFICANT CHANGE UP (ref 20.5–51.1)
MCHC RBC-ENTMCNC: 29 PG — SIGNIFICANT CHANGE UP (ref 27–31)
MCHC RBC-ENTMCNC: 31.4 G/DL — LOW (ref 32–37)
MCV RBC AUTO: 92.3 FL — SIGNIFICANT CHANGE UP (ref 81–99)
MONOCYTES # BLD AUTO: 0.48 K/UL — SIGNIFICANT CHANGE UP (ref 0.1–0.6)
MONOCYTES NFR BLD AUTO: 9.4 % — HIGH (ref 1.7–9.3)
NEUTROPHILS # BLD AUTO: 2.37 K/UL — SIGNIFICANT CHANGE UP (ref 1.4–6.5)
NEUTROPHILS NFR BLD AUTO: 46.3 % — SIGNIFICANT CHANGE UP (ref 42.2–75.2)
NRBC # BLD: 0 /100 WBCS — SIGNIFICANT CHANGE UP (ref 0–0)
PLATELET # BLD AUTO: 197 K/UL — SIGNIFICANT CHANGE UP (ref 130–400)
POTASSIUM SERPL-MCNC: 4.6 MMOL/L — SIGNIFICANT CHANGE UP (ref 3.5–5)
POTASSIUM SERPL-SCNC: 4.6 MMOL/L — SIGNIFICANT CHANGE UP (ref 3.5–5)
PROT SERPL-MCNC: 6.2 G/DL — SIGNIFICANT CHANGE UP (ref 6–8)
RBC # BLD: 3.79 M/UL — LOW (ref 4.2–5.4)
RBC # FLD: 15.3 % — HIGH (ref 11.5–14.5)
SODIUM SERPL-SCNC: 135 MMOL/L — SIGNIFICANT CHANGE UP (ref 135–146)
WBC # BLD: 5.11 K/UL — SIGNIFICANT CHANGE UP (ref 4.8–10.8)
WBC # FLD AUTO: 5.11 K/UL — SIGNIFICANT CHANGE UP (ref 4.8–10.8)

## 2020-09-20 RX ORDER — BENZTROPINE MESYLATE 1 MG
0.5 TABLET ORAL
Refills: 0 | Status: DISCONTINUED | OUTPATIENT
Start: 2020-09-20 | End: 2020-09-25

## 2020-09-20 RX ORDER — QUETIAPINE FUMARATE 200 MG/1
25 TABLET, FILM COATED ORAL AT BEDTIME
Refills: 0 | Status: DISCONTINUED | OUTPATIENT
Start: 2020-09-20 | End: 2020-09-25

## 2020-09-20 RX ADMIN — PANTOPRAZOLE SODIUM 40 MILLIGRAM(S): 20 TABLET, DELAYED RELEASE ORAL at 05:14

## 2020-09-20 RX ADMIN — ENOXAPARIN SODIUM 40 MILLIGRAM(S): 100 INJECTION SUBCUTANEOUS at 11:06

## 2020-09-20 RX ADMIN — QUETIAPINE FUMARATE 25 MILLIGRAM(S): 200 TABLET, FILM COATED ORAL at 21:27

## 2020-09-20 RX ADMIN — FLUCONAZOLE 200 MILLIGRAM(S): 150 TABLET ORAL at 11:05

## 2020-09-20 RX ADMIN — Medication 500000 UNIT(S): at 11:07

## 2020-09-20 RX ADMIN — Medication 500000 UNIT(S): at 17:18

## 2020-09-20 RX ADMIN — RITONAVIR 100 MILLIGRAM(S): 100 TABLET, FILM COATED ORAL at 11:07

## 2020-09-20 RX ADMIN — Medication 1 MILLIGRAM(S): at 11:06

## 2020-09-20 RX ADMIN — Medication 0.5 MILLIGRAM(S): at 21:26

## 2020-09-20 RX ADMIN — DOLUTEGRAVIR SODIUM 50 MILLIGRAM(S): 25 TABLET, FILM COATED ORAL at 11:06

## 2020-09-20 RX ADMIN — Medication 100 MILLIGRAM(S): at 11:06

## 2020-09-20 RX ADMIN — DARUNAVIR 800 MILLIGRAM(S): 75 TABLET, FILM COATED ORAL at 11:06

## 2020-09-20 RX ADMIN — ATORVASTATIN CALCIUM 10 MILLIGRAM(S): 80 TABLET, FILM COATED ORAL at 21:26

## 2020-09-20 NOTE — DIETITIAN INITIAL EVALUATION ADULT. - MALNUTRITION
Severe PCM of acute illness RT decreased appetite 2/2 diarrhea/vomiting and comorbidities AEB 16% weight loss in 3 weeks and severe muscle wasting to b/l temporal region. Goal: pt to consume >75% of meal tray and gain 1-2 lbs in 3 days

## 2020-09-20 NOTE — DIETITIAN INITIAL EVALUATION ADULT. - ENERGY NEEDS
calories: 1200 - 1500 kcals/day (MSJ x 1.2 - 1.5 AF for severe PCM)  protein: 54 - 68 gms/day (1.2 - 1.5 gm/kg for severe PCM)  fluid: 1ml /kcal or per LIP

## 2020-09-20 NOTE — PROGRESS NOTE ADULT - SUBJECTIVE AND OBJECTIVE BOX
LENA AGUIRRE  65y, Female    All available historical data reviewed    OVERNIGHT EVENTS:  no fevers    ROS:  General: Denies rigors, nightsweats  HEENT: Denies headache, rhinorrhea, sore throat, eye pain  CV: Denies CP, palpitations  PULM: Denies wheezing, hemoptysis  GI: Denies hematemesis, hematochezia, melena  : Denies discharge, hematuria  MSK: Denies arthralgias, myalgias  SKIN: Denies rash, lesions  NEURO: Denies paresthesias, weakness  PSYCH: Denies depression, anxiety    VITALS:  T(F): 99.3, Max: 99.5 (09-19-20 @ 19:39)  HR: 80  BP: 148/89  RR: 20Vital Signs Last 24 Hrs  T(C): 37.4 (20 Sep 2020 05:17), Max: 37.5 (19 Sep 2020 19:39)  T(F): 99.3 (20 Sep 2020 05:17), Max: 99.5 (19 Sep 2020 19:39)  HR: 80 (20 Sep 2020 05:17) (80 - 82)  BP: 148/89 (20 Sep 2020 05:17) (113/65 - 148/89)  BP(mean): --  RR: 20 (20 Sep 2020 05:17) (20 - 20)  SpO2: 100% (19 Sep 2020 19:39) (100% - 100%)    TESTS & MEASUREMENTS:                        11.0   5.11  )-----------( 197      ( 20 Sep 2020 07:36 )             35.0     09-20    135  |  102  |  14  ----------------------------<  133<H>  4.6   |  24  |  1.1    Ca    8.6      20 Sep 2020 07:36  Mg     1.4     09-19    TPro  6.2  /  Alb  2.6<L>  /  TBili  0.2  /  DBili  x   /  AST  29  /  ALT  19  /  AlkPhos  110  09-20    LIVER FUNCTIONS - ( 20 Sep 2020 07:36 )  Alb: 2.6 g/dL / Pro: 6.2 g/dL / ALK PHOS: 110 U/L / ALT: 19 U/L / AST: 29 U/L / GGT: x                   RADIOLOGY & ADDITIONAL TESTS:  Personal review of radiological diagnostics performed  Echo and EKG results noted when applicable.     MEDICATIONS:  amLODIPine   Tablet 10 milliGRAM(s) Oral once  atorvastatin 10 milliGRAM(s) Oral at bedtime  chlorhexidine 4% Liquid 1 Application(s) Topical once  darunavir 800 milliGRAM(s) Oral daily  dolutegravir 50 milliGRAM(s) Oral daily  enoxaparin Injectable 40 milliGRAM(s) SubCutaneous daily  fluconAZOLE   Tablet 200 milliGRAM(s) Oral daily  folic acid 1 milliGRAM(s) Oral daily  influenza   Vaccine 0.5 milliLiter(s) IntraMuscular once  nystatin    Suspension [id number] Unit(s) Oral four times a day  pantoprazole    Tablet 40 milliGRAM(s) Oral before breakfast  ritonavir Tablet 100 milliGRAM(s) Oral every 24 hours  thiamine 100 milliGRAM(s) Oral daily      ANTIBIOTICS:  darunavir 800 milliGRAM(s) Oral daily  dolutegravir 50 milliGRAM(s) Oral daily  fluconAZOLE   Tablet 200 milliGRAM(s) Oral daily  nystatin    Suspension [id number] Unit(s) Oral four times a day  ritonavir Tablet 100 milliGRAM(s) Oral every 24 hours

## 2020-09-20 NOTE — DIETITIAN INITIAL EVALUATION ADULT. - FACTORS AFF FOOD INTAKE
Pt very talkative. Dislikes chewing stearns on breakfast tray. Request 4 jace side eggs for breakfast. Observed pt edentulous - she says she is getting dentures on new years. Declines puree foods = agreeable to ground diet texture. Reports she vomited this morning and had diarrhea. This has been going on few days PTA as well. Endorses weight loss (see below). NKFA. Pt able to cook independently, gets food from food [employer] and [employer]. Declines MOW referral. Reports she drinks a nutrition supplement - says it's called "enfamil" that she got from a donation site. Pt agreeable to trial Ensure but strawberry flavor only.

## 2020-09-20 NOTE — DIETITIAN INITIAL EVALUATION ADULT. - PHYSICAL APPEARANCE
BMI 17.0  IBW: 54.5 kg UBW: 118# x 3 weeks ago. Also noted, pt weighed 68kg on last [facility] adm on 2/5/2020. BS 15. No edema per flow sheets. Based on NFPF, pt has severe muscle wasting to temporal region./underweight
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
## 2020-09-20 NOTE — CHART NOTE - FINDINGS AS BASED ON:
Food acceptance and intake status from observations by staff/Comprehensive nutrition assessment and consultation

## 2020-09-20 NOTE — DIETITIAN INITIAL EVALUATION ADULT. - OTHER INFO
Pt adm for near syncope. 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 AIDS 1/22/20 CD4 101. Oral candidiasis - on nystatin. ID following. Pre-DM - sugars controlled. Psychological disorder - psych c/s. Polysubstance and ETOH use.
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
## 2020-09-20 NOTE — CHART NOTE - TREATMENT: THE FOLLOWING DIET HAS BEEN RECOMMENDED
Indicators:   - 16% weight loss in 3 weeks  - severe muscle wasting to b/l temporal region    Recommendation:   - Change diet to Dysphagia 2 mech soft, thin liquids, DASH/TLC, w/ Ensure Enlive BID (strawberry flavor)  - Remove carb consistent diet modifier until po intake improves.   - Order MVI w/ minerals q24hrs and continue thiamin/folic acid supplementation
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
## 2020-09-20 NOTE — DIETITIAN INITIAL EVALUATION ADULT. - ADD RECOMMEND
Change diet to Dysphagia 2 [facility] soft, thin liquids, DASH/TLC, w/ Ensure Enlive BID (strawberry flavor). Order MVI w/ minerals q24hrs and continue thiamin/folic acid supplementation. Change diet to Dysphagia 2 [facility] soft, thin liquids, DASH/TLC, w/ Ensure Enlive BID (strawberry flavor). Order MVI w/ minerals q24hrs and continue thiamin/folic acid supplementation. Spoke w/ MD [phone]

## 2020-09-21 LAB
GLUCOSE BLDC GLUCOMTR-MCNC: 102 MG/DL — HIGH (ref 70–99)
GLUCOSE BLDC GLUCOMTR-MCNC: 119 MG/DL — HIGH (ref 70–99)
GLUCOSE BLDC GLUCOMTR-MCNC: 171 MG/DL — HIGH (ref 70–99)
GLUCOSE BLDC GLUCOMTR-MCNC: 208 MG/DL — HIGH (ref 70–99)

## 2020-09-21 PROCEDURE — 93306 TTE W/DOPPLER COMPLETE: CPT | Mod: 26

## 2020-09-21 RX ORDER — RITONAVIR 100 MG/1
100 TABLET, FILM COATED ORAL EVERY 24 HOURS
Refills: 0 | Status: DISCONTINUED | OUTPATIENT
Start: 2020-09-21 | End: 2020-09-23

## 2020-09-21 RX ORDER — DOLUTEGRAVIR SODIUM 25 MG/1
50 TABLET, FILM COATED ORAL DAILY
Refills: 0 | Status: DISCONTINUED | OUTPATIENT
Start: 2020-09-21 | End: 2020-09-25

## 2020-09-21 RX ORDER — DARUNAVIR 75 MG/1
800 TABLET, FILM COATED ORAL DAILY
Refills: 0 | Status: DISCONTINUED | OUTPATIENT
Start: 2020-09-21 | End: 2020-09-23

## 2020-09-21 RX ADMIN — Medication 0.5 MILLIGRAM(S): at 17:34

## 2020-09-21 RX ADMIN — Medication 500000 UNIT(S): at 00:34

## 2020-09-21 RX ADMIN — AMLODIPINE BESYLATE 10 MILLIGRAM(S): 2.5 TABLET ORAL at 15:20

## 2020-09-21 RX ADMIN — PANTOPRAZOLE SODIUM 40 MILLIGRAM(S): 20 TABLET, DELAYED RELEASE ORAL at 05:36

## 2020-09-21 RX ADMIN — QUETIAPINE FUMARATE 25 MILLIGRAM(S): 200 TABLET, FILM COATED ORAL at 22:00

## 2020-09-21 RX ADMIN — RITONAVIR 100 MILLIGRAM(S): 100 TABLET, FILM COATED ORAL at 17:33

## 2020-09-21 RX ADMIN — DARUNAVIR 800 MILLIGRAM(S): 75 TABLET, FILM COATED ORAL at 17:34

## 2020-09-21 RX ADMIN — DOLUTEGRAVIR SODIUM 50 MILLIGRAM(S): 25 TABLET, FILM COATED ORAL at 15:21

## 2020-09-21 RX ADMIN — DOLUTEGRAVIR SODIUM 50 MILLIGRAM(S): 25 TABLET, FILM COATED ORAL at 17:34

## 2020-09-21 RX ADMIN — RITONAVIR 100 MILLIGRAM(S): 100 TABLET, FILM COATED ORAL at 11:29

## 2020-09-21 RX ADMIN — DARUNAVIR 800 MILLIGRAM(S): 75 TABLET, FILM COATED ORAL at 15:21

## 2020-09-21 RX ADMIN — Medication 100 MILLIGRAM(S): at 11:29

## 2020-09-21 RX ADMIN — ATORVASTATIN CALCIUM 10 MILLIGRAM(S): 80 TABLET, FILM COATED ORAL at 22:00

## 2020-09-21 RX ADMIN — Medication 0.5 MILLIGRAM(S): at 05:35

## 2020-09-21 RX ADMIN — ENOXAPARIN SODIUM 40 MILLIGRAM(S): 100 INJECTION SUBCUTANEOUS at 11:30

## 2020-09-21 RX ADMIN — Medication 500000 UNIT(S): at 17:33
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
## 2020-09-22 LAB
GLUCOSE BLDC GLUCOMTR-MCNC: 114 MG/DL — HIGH (ref 70–99)
GLUCOSE BLDC GLUCOMTR-MCNC: 115 MG/DL — HIGH (ref 70–99)
GLUCOSE BLDC GLUCOMTR-MCNC: 140 MG/DL — HIGH (ref 70–99)

## 2020-09-22 RX ADMIN — Medication 500000 UNIT(S): at 05:20

## 2020-09-22 RX ADMIN — PANTOPRAZOLE SODIUM 40 MILLIGRAM(S): 20 TABLET, DELAYED RELEASE ORAL at 06:43

## 2020-09-22 RX ADMIN — RITONAVIR 100 MILLIGRAM(S): 100 TABLET, FILM COATED ORAL at 17:23

## 2020-09-22 RX ADMIN — Medication 0.5 MILLIGRAM(S): at 05:20

## 2020-09-22 RX ADMIN — DOLUTEGRAVIR SODIUM 50 MILLIGRAM(S): 25 TABLET, FILM COATED ORAL at 12:27

## 2020-09-22 RX ADMIN — Medication 0.5 MILLIGRAM(S): at 17:23

## 2020-09-22 RX ADMIN — ATORVASTATIN CALCIUM 10 MILLIGRAM(S): 80 TABLET, FILM COATED ORAL at 22:04

## 2020-09-22 RX ADMIN — ENOXAPARIN SODIUM 40 MILLIGRAM(S): 100 INJECTION SUBCUTANEOUS at 12:27

## 2020-09-22 RX ADMIN — Medication 100 MILLIGRAM(S): at 12:26

## 2020-09-22 RX ADMIN — Medication 500000 UNIT(S): at 12:26

## 2020-09-22 RX ADMIN — Medication 500000 UNIT(S): at 17:23

## 2020-09-22 RX ADMIN — FLUCONAZOLE 200 MILLIGRAM(S): 150 TABLET ORAL at 12:27

## 2020-09-22 RX ADMIN — DARUNAVIR 800 MILLIGRAM(S): 75 TABLET, FILM COATED ORAL at 12:27

## 2020-09-22 RX ADMIN — Medication 1 MILLIGRAM(S): at 12:26

## 2020-09-22 RX ADMIN — QUETIAPINE FUMARATE 25 MILLIGRAM(S): 200 TABLET, FILM COATED ORAL at 22:04
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
## 2020-09-22 NOTE — PROGRESS NOTE ADULT - SUBJECTIVE AND OBJECTIVE BOX
LENA AGUIRRE  65y, Female  Allergy: No Known Allergies      LOS  4d    CHIEF COMPLAINT: Near syncope (21 Sep 2020 21:47)      INTERVAL EVENTS/HPI  - No acute events overnight  - T(F): , Max: 99.1 (09-21-20 @ 20:55)  - Tolerating medication  - WBC Count: 5.11 (09-20-20 @ 07:36)     -      ROS  ***    VITALS:  T(F): 98.5, Max: 99.1 (09-21-20 @ 20:55)  HR: 80  BP: 141/90  RR: 18Vital Signs Last 24 Hrs  T(C): 36.9 (22 Sep 2020 04:47), Max: 37.3 (21 Sep 2020 20:55)  T(F): 98.5 (22 Sep 2020 04:47), Max: 99.1 (21 Sep 2020 20:55)  HR: 80 (22 Sep 2020 04:47) (80 - 94)  BP: 141/90 (22 Sep 2020 04:47) (141/74 - 141/90)  BP(mean): --  RR: 18 (22 Sep 2020 04:47) (18 - 18)  SpO2: --    PHYSICAL EXAM:  ***    FH: Non-contributory  Social Hx: Non-contributory    TESTS & MEASUREMENTS:                        INFECTIOUS DISEASES TESTING  Rapid RVP Result: NotDetec (09-18-20 @ 08:40)      INFLAMMATORY MARKERS      RADIOLOGY & ADDITIONAL TESTS:  I have personally reviewed the last available Chest xray  CXR      CT      CARDIOLOGY TESTING  12 Lead ECG:   Ventricular Rate 83 BPM    Atrial Rate 83 BPM    P-R Interval 116 ms    QRS Duration 74 ms    Q-T Interval 370 ms    QTC Calculation(Bazett) 434 ms    P Axis 70 degrees    R Axis 50 degrees    T Axis 63 degrees    Diagnosis Line Normal sinus rhythm  Possible Left atrial enlargement  Nonspecific T wave abnormality  Abnormal ECG    Confirmed by ANDRES SHUKLA MD ([id number]) on 9/18/2020 1:38:36 PM (09-18-20 @ 10:39)      MEDICATIONS  atorvastatin 10 Oral at bedtime  benztropine 0.5 Oral two times a day  darunavir 800 Oral daily  dolutegravir 50 Oral daily  enoxaparin Injectable 40 SubCutaneous daily  fluconAZOLE   Tablet 200 Oral daily  folic acid 1 Oral daily  nystatin    Suspension 072404 Oral four times a day  pantoprazole    Tablet 40 Oral before breakfast  QUEtiapine 25 Oral at bedtime  ritonavir Tablet 100 Oral every 24 hours  thiamine 100 Oral daily      WEIGHT  Weight (kg): 45 (09-18-20 @ 22:21)      ANTIBIOTICS:  darunavir 800 milliGRAM(s) Oral daily  dolutegravir 50 milliGRAM(s) Oral daily  fluconAZOLE   Tablet 200 milliGRAM(s) Oral daily  nystatin    Suspension 441198 Unit(s) Oral four times a day  ritonavir Tablet 100 milliGRAM(s) Oral every 24 hours      All available historical records have been reviewed       LENA AGUIRRE  65y, Female  Allergy: No Known Allergies      LOS  4d    CHIEF COMPLAINT: Near syncope (21 Sep 2020 21:47)      INTERVAL EVENTS/HPI  - No acute events overnight  - T(F): , Max: 99.1 (09-21-20 @ 20:55)  - Tolerating medication  - WBC Count: 5.11 (09-20-20 @ 07:36)     -      ROS  General: Denies rigors, nightsweats  HEENT: Denies headache, rhinorrhea, sore throat, eye pain  CV: Denies CP, palpitations  PULM: Denies wheezing, hemoptysis  GI: Denies hematemesis, hematochezia, melena  : Denies discharge, hematuria  MSK: Denies arthralgias, myalgias  SKIN: Denies rash, lesions  NEURO: Denies paresthesias, weakness  PSYCH: Denies depression, anxiety     VITALS:  T(F): 98.5, Max: 99.1 (09-21-20 @ 20:55)  HR: 80  BP: 141/90  RR: 18Vital Signs Last 24 Hrs  T(C): 36.9 (22 Sep 2020 04:47), Max: 37.3 (21 Sep 2020 20:55)  T(F): 98.5 (22 Sep 2020 04:47), Max: 99.1 (21 Sep 2020 20:55)  HR: 80 (22 Sep 2020 04:47) (80 - 94)  BP: 141/90 (22 Sep 2020 04:47) (141/74 - 141/90)  BP(mean): --  RR: 18 (22 Sep 2020 04:47) (18 - 18)  SpO2: --    PHYSICAL EXAM:  Gen: cachectic  HEENT: Normocephalic, atraumatic thrush resolved  Neck: supple, no lymphadenopathy  CV: Regular rate & regular rhythm  Lungs: decreased BS at bases, no fremitus  Abdomen: Soft, BS present  Ext: Warm, well perfused  Neuro: non focal, awake  Skin: no rash, no erythema  Lines: no phlebitis     FH: Non-contributory  Social Hx: Non-contributory    TESTS & MEASUREMENTS:                        INFECTIOUS DISEASES TESTING  Rapid RVP Result: NotDetec (09-18-20 @ 08:40)      INFLAMMATORY MARKERS      RADIOLOGY & ADDITIONAL TESTS:  I have personally reviewed the last available Chest xray  CXR      CT      CARDIOLOGY TESTING  12 Lead ECG:   Ventricular Rate 83 BPM    Atrial Rate 83 BPM    P-R Interval 116 ms    QRS Duration 74 ms    Q-T Interval 370 ms    QTC Calculation(Bazett) 434 ms    P Axis 70 degrees    R Axis 50 degrees    T Axis 63 degrees    Diagnosis Line Normal sinus rhythm  Possible Left atrial enlargement  Nonspecific T wave abnormality  Abnormal ECG    Confirmed by ANDRES SHUKLA MD ([id number]) on 9/18/2020 1:38:36 PM (09-18-20 @ 10:39)      MEDICATIONS  atorvastatin 10 Oral at bedtime  benztropine 0.5 Oral two times a day  darunavir 800 Oral daily  dolutegravir 50 Oral daily  enoxaparin Injectable 40 SubCutaneous daily  fluconAZOLE   Tablet 200 Oral daily  folic acid 1 Oral daily  nystatin    Suspension 196945 Oral four times a day  pantoprazole    Tablet 40 Oral before breakfast  QUEtiapine 25 Oral at bedtime  ritonavir Tablet 100 Oral every 24 hours  thiamine 100 Oral daily      WEIGHT  Weight (kg): 45 (09-18-20 @ 22:21)      ANTIBIOTICS:  darunavir 800 milliGRAM(s) Oral daily  dolutegravir 50 milliGRAM(s) Oral daily  fluconAZOLE   Tablet 200 milliGRAM(s) Oral daily  nystatin    Suspension 284901 Unit(s) Oral four times a day  ritonavir Tablet 100 milliGRAM(s) Oral every 24 hours      All available historical records have been reviewed
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
## 2020-09-22 NOTE — CONSULT NOTE ADULT - SUBJECTIVE AND OBJECTIVE BOX
Detox consult    Pt seen and chart reviewed  admitted for syncope  recurrent  hx of AIDS on HAART  Has hx of etoh, marijuana abuse but denies any recent abuse  Hx of psych disorder  Denies any opioids abuse  Wt loss  No w/d symptoms present      SOCIAL HISTORY: n/a    REVIEW OF SYSTEMS:    Constitutional: No fever,  (++) weight loss (++) fatigue  ENT:  No difficulty hearing, tinnitus, vertigo; No sinus or throat pain  Neck: No pain or stiffness  Respiratory: No cough, wheezing, chills or hemoptysis  Cardiovascular: No chest pain, palpitations, shortness of breath, dizziness or leg swelling  Gastrointestinal: No abdominal or epigastric pain. No nausea, vomiting or hematemesis; No diarrhea or constipation. No melena or hematochezia.  Neurological: No headaches, memory loss, loss of strength, numbness or tremors  Musculoskeletal: No joint pain or swelling; No muscle, back or extremity pain  Psychiatric: (++) depression, anxiety, mood swings or difficulty sleeping    MEDICATIONS  (STANDING):  atorvastatin 10 milliGRAM(s) Oral at bedtime  benztropine 0.5 milliGRAM(s) Oral two times a day  darunavir 800 milliGRAM(s) Oral daily  dolutegravir 50 milliGRAM(s) Oral daily  enoxaparin Injectable 40 milliGRAM(s) SubCutaneous daily  fluconAZOLE   Tablet 200 milliGRAM(s) Oral daily  folic acid 1 milliGRAM(s) Oral daily  nystatin    Suspension 578224 Unit(s) Oral four times a day  pantoprazole    Tablet 40 milliGRAM(s) Oral before breakfast  QUEtiapine 25 milliGRAM(s) Oral at bedtime  ritonavir Tablet 100 milliGRAM(s) Oral every 24 hours  thiamine 100 milliGRAM(s) Oral daily    MEDICATIONS  (PRN):      Vital Signs Last 24 Hrs  T(C): 36.9 (22 Sep 2020 04:47), Max: 37.3 (21 Sep 2020 20:55)  T(F): 98.5 (22 Sep 2020 04:47), Max: 99.1 (21 Sep 2020 20:55)  HR: 80 (22 Sep 2020 04:47) (80 - 94)  BP: 141/90 (22 Sep 2020 04:47) (141/74 - 141/90)  BP(mean): --  RR: 18 (22 Sep 2020 04:47) (18 - 18)  SpO2: --    PHYSICAL EXAM:    Constitutional: NAD, Cachexia (+)  HEENT: PERRLA, EOMI, Normal Hearing, MMM  Neck: No LAD, No JVD  Back: Normal spine flexure, No CVA tenderness  Respiratory: CTAB/L  Cardiovascular: S1 and S2, RRR, no M/G/R  Gastrointestinal: BS+, soft, NT/ND  Extremities: No peripheral edema  Vascular: 2+ peripheral pulses  Neurological: A/O x 3, no focal deficits    LABS:              Drug Screen Urine:  Alcohol Level        RADIOLOGY & ADDITIONAL STUDIES: N/A
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
## 2020-09-23 LAB
AMPHET UR-MCNC: SIGNIFICANT CHANGE UP
BARBITURATES UR SCN-MCNC: SIGNIFICANT CHANGE UP
BENZODIAZ UR-MCNC: SIGNIFICANT CHANGE UP
COCAINE METAB.OTHER UR-MCNC: SIGNIFICANT CHANGE UP
GLUCOSE BLDC GLUCOMTR-MCNC: 113 MG/DL — HIGH (ref 70–99)
GLUCOSE BLDC GLUCOMTR-MCNC: 143 MG/DL — HIGH (ref 70–99)
GLUCOSE BLDC GLUCOMTR-MCNC: 148 MG/DL — HIGH (ref 70–99)
METHADONE UR-MCNC: SIGNIFICANT CHANGE UP
OPIATES UR-MCNC: SIGNIFICANT CHANGE UP
PCP SPEC-MCNC: SIGNIFICANT CHANGE UP
PROPOXYPHENE QUALITATIVE URINE RESULT: SIGNIFICANT CHANGE UP

## 2020-09-23 RX ORDER — ATOVAQUONE 750 MG/5ML
1500 SUSPENSION ORAL DAILY
Refills: 0 | Status: DISCONTINUED | OUTPATIENT
Start: 2020-09-23 | End: 2020-09-25

## 2020-09-23 RX ORDER — DARUNAVIR ETHANOLATE AND COBICISTAT 800; 150 MG/1; MG/1
1 TABLET, FILM COATED ORAL DAILY
Refills: 0 | Status: DISCONTINUED | OUTPATIENT
Start: 2020-09-24 | End: 2020-09-25

## 2020-09-23 RX ORDER — DARUNAVIR ETHANOLATE AND COBICISTAT 800; 150 MG/1; MG/1
1 TABLET, FILM COATED ORAL DAILY
Refills: 0 | Status: DISCONTINUED | OUTPATIENT
Start: 2020-09-23 | End: 2020-09-23

## 2020-09-23 RX ADMIN — Medication 0.5 MILLIGRAM(S): at 17:09

## 2020-09-23 RX ADMIN — Medication 500000 UNIT(S): at 17:09

## 2020-09-23 RX ADMIN — Medication 0.5 MILLIGRAM(S): at 05:58

## 2020-09-23 RX ADMIN — FLUCONAZOLE 200 MILLIGRAM(S): 150 TABLET ORAL at 11:48

## 2020-09-23 RX ADMIN — Medication 500000 UNIT(S): at 00:12

## 2020-09-23 RX ADMIN — Medication 1 MILLIGRAM(S): at 11:48

## 2020-09-23 RX ADMIN — ENOXAPARIN SODIUM 40 MILLIGRAM(S): 100 INJECTION SUBCUTANEOUS at 12:11

## 2020-09-23 RX ADMIN — PANTOPRAZOLE SODIUM 40 MILLIGRAM(S): 20 TABLET, DELAYED RELEASE ORAL at 05:58

## 2020-09-23 RX ADMIN — DARUNAVIR ETHANOLATE AND COBICISTAT 1 TABLET(S): 800; 150 TABLET, FILM COATED ORAL at 14:55

## 2020-09-23 RX ADMIN — Medication 500000 UNIT(S): at 05:59

## 2020-09-23 RX ADMIN — Medication 500000 UNIT(S): at 11:49

## 2020-09-23 RX ADMIN — Medication 100 MILLIGRAM(S): at 11:49

## 2020-09-23 RX ADMIN — ATOVAQUONE 1500 MILLIGRAM(S): 750 SUSPENSION ORAL at 17:08

## 2020-09-23 RX ADMIN — DOLUTEGRAVIR SODIUM 50 MILLIGRAM(S): 25 TABLET, FILM COATED ORAL at 11:48
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
## 2020-09-23 NOTE — PHYSICAL THERAPY INITIAL EVALUATION ADULT - LIVES WITH, PROFILE
other relative/grandson (per Care Coordination note, though unable to obtain full history d/t pt psychiatric status)
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
## 2020-09-23 NOTE — PHYSICAL THERAPY INITIAL EVALUATION ADULT - FOLLOWS COMMANDS/ANSWERS QUESTIONS, REHAB EVAL
pt needs consistent cueing and coercion to keep attention on present task/75% of the time/able to follow multistep instructions

## 2020-09-23 NOTE — PROGRESS NOTE ADULT - ASSESSMENT
64 yo F with AIDS on HAART, HTN, DM, HLD, pre-diabetes, psych disorder (numerous hospitalizations, treated w/ cogentin and haldol in the past), polysubstance and ETOH abuse presents to the ED with recurrent episodes of syncope/near syncope, weight loss, diarrhea and erratic behavior.    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 Order MVI w/ minerals q24hrs and continue thiamin/folic acid supplementation  - If diarrhea persists, r/o crypto, C diff, etc    #AIDS 1/22/20   - CD4 88; 4%,   - Dolutegravir 50mg qd  - Prezcobix 800mg/150mg qd  - Mepron 1500mg qd  - Follows with Coral Navarro  - Full T Cell Subset; CD19 %: 10 %, CD3 %: 84 %, CD4 %: 4 %, CD8 %: 77 %, 4/8 Ratio: 0.06 Ratio, ABS [id number]: 65 /uL, ABS CD19: 194 /uL, ABS CD3: 1667 /uL, ABS CD4: 88 /uL, ABS CD8: 1525 /uL, [id number] %: 3 %     #Oral candidiasis  - Fluconazole 200mg PO x 7-14 days  - Nystatin s/s  - Resolved    #Pre-DM   - HbA1c 6.1  - bG 120's-140's  - Monitor FS    #Psychological disorder, no active SI/HI, multiple previous admissions  -  Psych; Seroquel 60mg PO at night and Benztropine 0.5 mg PO BID  - d/c 1:1 obs  - Cleared from psych standpoint, not candidate for IPP  - f/u Addiction medicine consult    #HTN  -/67 in ED, Last 122/68  - c/w Norvasc 5mg PO qh      #HLD  - atorvastatin 10 mg PO at bedtime    #Polysubstance and alcohol abuse  - f/u UTox  - c/w folic acid and thiamine       #Dispo: NH tomorrow  Code: FULL  DVT ppx: lovenox 40 QD  Diet: carb consistent/ DASH  Act: increase as tolerated         64 yo F with AIDS on HAART, HTN, DM, HLD, pre-diabetes, psych disorder (numerous hospitalizations, treated w/ cogentin and haldol in the past), polysubstance and ETOH abuse presents to the ED with recurrent episodes of syncope/near syncope, weight loss, diarrhea and erratic behavior.    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 Order MVI w/ minerals q24hrs and continue thiamin/folic acid supplementation    #AIDS 1/22/20   - CD4 88; 4%,   - Dolutegravir 50mg qd  - Prezcobix 800mg/150mg qd  - Mepron 1500mg qd for PCP/Toxo ppx  - Follows with Coral Navarro  - Full T Cell Subset; CD19 %: 10 %, CD3 %: 84 %, CD4 %: 4 %, CD8 %: 77 %, 4/8 Ratio: 0.06 Ratio, ABS [id number]: 65 /uL, ABS CD19: 194 /uL, ABS CD3: 1667 /uL, ABS CD4: 88 /uL, ABS CD8: 1525 /uL, [id number] %: 3 %     #Oral candidiasis  - Fluconazole 200mg PO x 7-14 days  - Nystatin s/s  - Resolved    #Pre-DM   - HbA1c 6.1  - bG 120's-140's  - Monitor FS    #Psychological disorder, no active SI/HI, multiple previous admissions  -  Psych; Seroquel 60mg PO at night and Benztropine 0.5 mg PO BID  - d/c 1:1 obs  - Cleared from psych standpoint, not candidate for IPP  - f/u Addiction medicine consult    #HTN  -/67 in ED, Last 122/68  - c/w Norvasc 5mg PO qh      #HLD  - atorvastatin 10 mg PO at bedtime    #Polysubstance and alcohol abuse  - f/u UTox  - c/w folic acid and thiamine       #Dispo: NH tomorrow  Code: FULL  DVT ppx: lovenox 40 QD  Diet: carb consistent/ DASH  Act: increase as tolerated

## 2020-09-23 NOTE — PHYSICAL THERAPY INITIAL EVALUATION ADULT - PERTINENT HX OF CURRENT PROBLEM, REHAB EVAL
64 yo F with AIDS on HAART, HTN, DM, HLD, pre-diabetes, psych disorder (numerous hospitalizations, treated w/ cogentin and haldol in the past), polysubstance and ETOH abuse presents to the ED with reported recurrent episodes of syncope and near syncope w/o preceding sxs

## 2020-09-23 NOTE — PROGRESS NOTE ADULT - SUBJECTIVE AND OBJECTIVE BOX
LENA AGUIRRE  65y, Female  Allergy: No Known Allergies      LOS  5d    CHIEF COMPLAINT: Near syncope (22 Sep 2020 20:09)      INTERVAL EVENTS/HPI  - No acute events overnight  - T(F): , Max: 99.5 (09-22-20 @ 20:49)  - Denies any worsening symptoms  - Tolerating medication  -    -      ROS  General: Denies rigors, nightsweats  HEENT: Denies headache, rhinorrhea, sore throat, eye pain  CV: Denies CP, palpitations  PULM: Denies wheezing, hemoptysis  GI: Denies hematemesis, hematochezia, melena  : Denies discharge, hematuria  MSK: Denies arthralgias, myalgias  SKIN: Denies rash, lesions  NEURO: Denies paresthesias, weakness  PSYCH: Denies depression, anxiety    VITALS:  T(F): 99.5, Max: 99.5 (09-22-20 @ 20:49)  HR: 79  BP: 122/68  RR: 18Vital Signs Last 24 Hrs  T(C): 37.5 (22 Sep 2020 20:49), Max: 37.5 (22 Sep 2020 20:49)  T(F): 99.5 (22 Sep 2020 20:49), Max: 99.5 (22 Sep 2020 20:49)  HR: 79 (22 Sep 2020 20:49) (79 - 79)  BP: 122/68 (22 Sep 2020 20:49) (122/68 - 122/68)  BP(mean): --  RR: 18 (22 Sep 2020 20:49) (18 - 18)  SpO2: --    PHYSICAL EXAM:  Gen: cachectic  HEENT: Normocephalic, atraumatic thrush resolved  Neck: supple, no lymphadenopathy  CV: Regular rate & regular rhythm  Lungs: decreased BS at bases, no fremitus  Abdomen: Soft, BS present  Ext: Warm, well perfused  Neuro: non focal, awake, pill-rolling motions  Skin: no rash, no erythema  Lines: no phlebitis     FH: Non-contributory  Social Hx: Non-contributory    TESTS & MEASUREMENTS:                        INFECTIOUS DISEASES TESTING  Rapid RVP Result: NotDetec (09-18-20 @ 08:40)      INFLAMMATORY MARKERS      RADIOLOGY & ADDITIONAL TESTS:  I have personally reviewed the last available Chest xray  CXR      CT      CARDIOLOGY TESTING  12 Lead ECG:   Ventricular Rate 83 BPM    Atrial Rate 83 BPM    P-R Interval 116 ms    QRS Duration 74 ms    Q-T Interval 370 ms    QTC Calculation(Bazett) 434 ms    P Axis 70 degrees    R Axis 50 degrees    T Axis 63 degrees    Diagnosis Line Normal sinus rhythm  Possible Left atrial enlargement  Nonspecific T wave abnormality  Abnormal ECG    Confirmed by ANDRES SHUKLA MD ([id number]) on 9/18/2020 1:38:36 PM (09-18-20 @ 10:39)      MEDICATIONS  atorvastatin 10 Oral at bedtime  benztropine 0.5 Oral two times a day  darunavir 800 Oral daily  dolutegravir 50 Oral daily  enoxaparin Injectable 40 SubCutaneous daily  fluconAZOLE   Tablet 200 Oral daily  folic acid 1 Oral daily  nystatin    Suspension 774661 Oral four times a day  pantoprazole    Tablet 40 Oral before breakfast  QUEtiapine 25 Oral at bedtime  ritonavir Tablet 100 Oral every 24 hours  thiamine 100 Oral daily      WEIGHT  Weight (kg): 45 (09-18-20 @ 22:21)      ANTIBIOTICS:  darunavir 800 milliGRAM(s) Oral daily  dolutegravir 50 milliGRAM(s) Oral daily  fluconAZOLE   Tablet 200 milliGRAM(s) Oral daily  nystatin    Suspension 551001 Unit(s) Oral four times a day  ritonavir Tablet 100 milliGRAM(s) Oral every 24 hours      All available historical records have been reviewed

## 2020-09-23 NOTE — CHART NOTE - NSCHARTNOTEFT_GEN_A_CORE
Registered Dietitian Follow-Up     Patient Profile Reviewed                           Yes [X]   No []     Nutrition History Previously Obtained        Yes [X]  No []       Pertinent Subjective Information: Pt disoriented and unable to answer appropriate questions, spoke to PCA present in the room. reports pt with PO 75% and eating at least 90% of meal trays. RN flowsheets document 75% PO (9/20), 100% (9/19). Pt unable to emphasize on further food preferences at this time. Previous RD diet order not activated, spoke to resident.      Pertinent Medical Interventions: Pt adm for near syncope. 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 HTN, HLD, DM noted; Aids - pt on medication regimen per infectious disease 9/23. Per am rounds, pt is medically cleared for DC per care coordination note 9/23.      Diet order: Soft, DASH/TLC     Anthropometrics:  - Ht. 162.6cm  - Wt. 45kg (9/18), 45.2kg (9/19) - relatively stable   - %wt change: 16% change in 3 weeks - per last RD assessment 9/20   - BMI: 17.0 (using 45kg per last RD assessment)   - IBW: 54.5 kg     Pertinent Lab Data: POCT Gluc (9/23) 113, (9/22) 115, (9/21) 119  No new labs since last RD assessment 9/20      Pertinent Meds: enoxaparin, lipitor, folic acid, protonix, thiamine     Physical Findings:  - Appearance: disoriented X4, confused per RN flow sheet; no edema noted  - GI function: PCA not aware of LBM, last documented (9/19) in EMR; However, reports pt without any GI discomfort.  - Tubes: none  - Oral/Mouth cavity: no chewing/swallowing difficulty per PCA  - Skin: intact      Nutrition Requirements: per Last RD assessment 9/20   Weight Used: dosing 45 kg    energy: 1200 - 1500 kcals/day (MSJ x 1.2 - 1.5 AF for severe PCM)  protein: 54 - 68 gms/day (1.2 - 1.5 gm/kg for severe PCM)  fluid: 1ml /kcal or per LIP    Nutrient Intake: PCA reports PO intake >75%.      [] Previous Nutrition Diagnosis: Severe PCM of acute illness RT decreased appetite 2/2 diarrhea/vomiting and comorbidities AEB 16% weight loss in 3 weeks and severe muscle wasting to b/l temporal region.       [X] Ongoing          [] Resolved    2.  Inadequate Oral Intake.    [] Ongoing          [x] Resolved    Nutrition Intervention: meal/snack, med food supplement, vitamin/mineral    Goal/Expected Outcome pt to consume >75% of meal tray and gain 1-2 lbs in 3 days.     Indicator/Monitoring: RD to monitor energy intake, diet order, body comp, NFPF, glucose profile    Recommendation: Change diet to Dysphagia 2 mech soft, thin liquids, DASH/TLC, w/ Ensure Enlive BID (strawberry flavor). Order MVI w/ minerals q24hrs and continue thiamin/folic acid supplementation.      Pt at risk, RD to f/u in 3 days. Registered Dietitian Follow-Up     Patient Profile Reviewed                           Yes [X]   No []     Nutrition History Previously Obtained        Yes [X]  No []       Pertinent Subjective Information: Pt disoriented and unable to answer appropriate questions, spoke to PCA present in the room. reports pt with PO 75% and eating at least 90% of meal trays. RN flowsheets document 75% PO (9/20), 100% (9/19). Pt unable to emphasize on further food preferences at this time. Previous RD diet order not activated, spoke to resident.      Pertinent Medical Interventions: Pt adm for near syncope. 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 HTN, HLD, DM noted; Aids - pt on medication regimen per infectious disease 9/23. Per am rounds, pt is medically cleared for DC per care coordination note 9/23.      Diet order: Soft, DASH/TLC     Anthropometrics:  - Ht. 162.6cm  - Wt. 45kg (9/18), 45.2kg (9/19) - relatively stable   - %wt change: 16% change in 3 weeks - per last RD assessment 9/20   - BMI: 17.0 (using 45kg per last RD assessment)   - IBW: 54.5 kg     Pertinent Lab Data: POCT Gluc (9/23) 113, (9/22) 115, (9/21) 119  No new labs since last RD assessment 9/20      Pertinent Meds: enoxaparin, lipitor, folic acid, protonix, thiamine     Physical Findings:  - Appearance: disoriented X4, confused per RN flow sheet; no edema noted  - GI function: PCA not aware of LBM, last documented (9/19) in EMR; However, reports pt without any GI discomfort.  - Tubes: none  - Oral/Mouth cavity: no chewing/swallowing difficulty per PCA  - Skin: intact      Nutrition Requirements: per Last RD assessment 9/20   Weight Used: dosing 45 kg    energy: 1200 - 1500 kcals/day (MSJ x 1.2 - 1.5 AF for severe PCM)  protein: 54 - 68 gms/day (1.2 - 1.5 gm/kg for severe PCM)  fluid: 1ml /kcal or per LIP    Nutrient Intake: PCA reports PO intake >75%.      [] Previous Nutrition Diagnosis: Severe PCM of acute illness RT decreased appetite 2/2 diarrhea/vomiting and comorbidities AEB 16% weight loss in 3 weeks and severe muscle wasting to b/l temporal region.       [X] Ongoing          [] Resolved    2.  Inadequate Oral Intake.    [] Ongoing          [x] Resolved    Nutrition Intervention: meal/snack, med food supplement, vitamin/mineral    Goal/Expected Outcome pt to consume >75% of meal tray and gain 1-2 lbs in 3 days.     Indicator/Monitoring: RD to monitor energy intake, diet order, body comp, NFPF, glucose profile    Recommendation: Change diet to Dysphagia 2 mech soft, thin liquids, DASH/TLC, w/ Ensure Enlive BID (strawberry flavor). Order MVI w/ minerals q24hrs and continue thiamin/folic acid supplementation.      Pt at risk, RD to f/u in 3 days. contacted resident [id number]

## 2020-09-24 ENCOUNTER — TRANSCRIPTION ENCOUNTER (OUTPATIENT)
Age: 65
End: 2020-09-24

## 2020-09-24 LAB
ANION GAP SERPL CALC-SCNC: 8 MMOL/L — SIGNIFICANT CHANGE UP (ref 7–14)
BASOPHILS # BLD AUTO: 0.05 K/UL — SIGNIFICANT CHANGE UP (ref 0–0.2)
BASOPHILS NFR BLD AUTO: 0.9 % — SIGNIFICANT CHANGE UP (ref 0–1)
BUN SERPL-MCNC: 16 MG/DL — SIGNIFICANT CHANGE UP (ref 10–20)
CALCIUM SERPL-MCNC: 9 MG/DL — SIGNIFICANT CHANGE UP (ref 8.5–10.1)
CHLORIDE SERPL-SCNC: 104 MMOL/L — SIGNIFICANT CHANGE UP (ref 98–110)
CO2 SERPL-SCNC: 25 MMOL/L — SIGNIFICANT CHANGE UP (ref 17–32)
CREAT SERPL-MCNC: 1 MG/DL — SIGNIFICANT CHANGE UP (ref 0.7–1.5)
EOSINOPHIL # BLD AUTO: 0.06 K/UL — SIGNIFICANT CHANGE UP (ref 0–0.7)
EOSINOPHIL NFR BLD AUTO: 1.1 % — SIGNIFICANT CHANGE UP (ref 0–8)
GLUCOSE BLDC GLUCOMTR-MCNC: 126 MG/DL — HIGH (ref 70–99)
GLUCOSE SERPL-MCNC: 123 MG/DL — HIGH (ref 70–99)
HCT VFR BLD CALC: 34.8 % — LOW (ref 37–47)
HGB BLD-MCNC: 11.1 G/DL — LOW (ref 12–16)
IMM GRANULOCYTES NFR BLD AUTO: 0.4 % — HIGH (ref 0.1–0.3)
LYMPHOCYTES # BLD AUTO: 2.57 K/UL — SIGNIFICANT CHANGE UP (ref 1.2–3.4)
LYMPHOCYTES # BLD AUTO: 47.2 % — SIGNIFICANT CHANGE UP (ref 20.5–51.1)
MCHC RBC-ENTMCNC: 28.5 PG — SIGNIFICANT CHANGE UP (ref 27–31)
MCHC RBC-ENTMCNC: 31.9 G/DL — LOW (ref 32–37)
MCV RBC AUTO: 89.5 FL — SIGNIFICANT CHANGE UP (ref 81–99)
MONOCYTES # BLD AUTO: 0.53 K/UL — SIGNIFICANT CHANGE UP (ref 0.1–0.6)
MONOCYTES NFR BLD AUTO: 9.7 % — HIGH (ref 1.7–9.3)
NEUTROPHILS # BLD AUTO: 2.22 K/UL — SIGNIFICANT CHANGE UP (ref 1.4–6.5)
NEUTROPHILS NFR BLD AUTO: 40.7 % — LOW (ref 42.2–75.2)
NRBC # BLD: 0 /100 WBCS — SIGNIFICANT CHANGE UP (ref 0–0)
PLATELET # BLD AUTO: 173 K/UL — SIGNIFICANT CHANGE UP (ref 130–400)
POTASSIUM SERPL-MCNC: 5 MMOL/L — SIGNIFICANT CHANGE UP (ref 3.5–5)
POTASSIUM SERPL-SCNC: 5 MMOL/L — SIGNIFICANT CHANGE UP (ref 3.5–5)
RBC # BLD: 3.89 M/UL — LOW (ref 4.2–5.4)
RBC # FLD: 15.2 % — HIGH (ref 11.5–14.5)
SARS-COV-2 RNA SPEC QL NAA+PROBE: SIGNIFICANT CHANGE UP
SODIUM SERPL-SCNC: 137 MMOL/L — SIGNIFICANT CHANGE UP (ref 135–146)
WBC # BLD: 5.45 K/UL — SIGNIFICANT CHANGE UP (ref 4.8–10.8)
WBC # FLD AUTO: 5.45 K/UL — SIGNIFICANT CHANGE UP (ref 4.8–10.8)

## 2020-09-24 RX ORDER — DOLUTEGRAVIR SODIUM 25 MG/1
1 TABLET, FILM COATED ORAL
Qty: 28 | Refills: 0
Start: 2020-09-24 | End: 2020-10-07

## 2020-09-24 RX ORDER — THIAMINE MONONITRATE (VIT B1) 100 MG
1 TABLET ORAL
Qty: 30 | Refills: 0
Start: 2020-09-24 | End: 2020-10-23

## 2020-09-24 RX ORDER — FLUCONAZOLE 150 MG/1
1 TABLET ORAL
Qty: 8 | Refills: 0
Start: 2020-09-24 | End: 2020-10-01

## 2020-09-24 RX ORDER — QUETIAPINE FUMARATE 200 MG/1
1 TABLET, FILM COATED ORAL
Qty: 30 | Refills: 0
Start: 2020-09-24 | End: 2020-10-23

## 2020-09-24 RX ORDER — ATOVAQUONE 750 MG/5ML
1500 SUSPENSION ORAL
Qty: 45000 | Refills: 0
Start: 2020-09-24 | End: 2020-10-23

## 2020-09-24 RX ORDER — DARUNAVIR ETHANOLATE AND COBICISTAT 800; 150 MG/1; MG/1
1 TABLET, FILM COATED ORAL
Qty: 30 | Refills: 0
Start: 2020-09-24 | End: 2020-10-23

## 2020-09-24 RX ORDER — DARUNAVIR 75 MG/1
1 TABLET, FILM COATED ORAL
Qty: 14 | Refills: 0
Start: 2020-09-24 | End: 2020-10-07

## 2020-09-24 RX ORDER — BENZTROPINE MESYLATE 1 MG
1 TABLET ORAL
Qty: 60 | Refills: 0
Start: 2020-09-24 | End: 2020-10-23

## 2020-09-24 RX ORDER — FOLIC ACID 0.8 MG
1 TABLET ORAL
Qty: 30 | Refills: 0
Start: 2020-09-24 | End: 2020-10-23

## 2020-09-24 RX ADMIN — Medication 1 MILLIGRAM(S): at 11:51

## 2020-09-24 RX ADMIN — Medication 100 MILLIGRAM(S): at 11:52

## 2020-09-24 RX ADMIN — Medication 500000 UNIT(S): at 11:53

## 2020-09-24 RX ADMIN — Medication 0.5 MILLIGRAM(S): at 17:22

## 2020-09-24 RX ADMIN — FLUCONAZOLE 200 MILLIGRAM(S): 150 TABLET ORAL at 11:53

## 2020-09-24 RX ADMIN — QUETIAPINE FUMARATE 25 MILLIGRAM(S): 200 TABLET, FILM COATED ORAL at 21:28

## 2020-09-24 RX ADMIN — DOLUTEGRAVIR SODIUM 50 MILLIGRAM(S): 25 TABLET, FILM COATED ORAL at 11:52

## 2020-09-24 RX ADMIN — ENOXAPARIN SODIUM 40 MILLIGRAM(S): 100 INJECTION SUBCUTANEOUS at 11:51

## 2020-09-24 RX ADMIN — ATOVAQUONE 1500 MILLIGRAM(S): 750 SUSPENSION ORAL at 11:50

## 2020-09-24 RX ADMIN — ATORVASTATIN CALCIUM 10 MILLIGRAM(S): 80 TABLET, FILM COATED ORAL at 21:27

## 2020-09-24 RX ADMIN — DARUNAVIR ETHANOLATE AND COBICISTAT 1 TABLET(S): 800; 150 TABLET, FILM COATED ORAL at 11:53
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
## 2020-09-24 NOTE — PROGRESS NOTE ADULT - SUBJECTIVE AND OBJECTIVE BOX
LENA AGUIRRE  65y, Female  Allergy: No Known Allergies      LOS  6d    CHIEF COMPLAINT: Near syncope (23 Sep 2020 20:10)      INTERVAL EVENTS/HPI  - No acute events overnight, continues to state she needs to get her money from the bank  - T(F): , Max: 97.9 (09-24-20 @ 05:00)  - Denies any worsening symptoms  - Tolerating medication  - WBC Count: 5.45 (09-24-20 @ 08:21)  - Creatinine, Serum: 1.0 (09-24-20 @ 08:21)       ROS  General: Denies rigors, nightsweats  HEENT: Denies headache, rhinorrhea, sore throat, eye pain  CV: Denies CP, palpitations  PULM: Denies wheezing, hemoptysis  GI: Denies hematemesis, hematochezia, melena  : Denies discharge, hematuria  MSK: Denies arthralgias, myalgias  SKIN: Denies rash, lesions  NEURO: Denies paresthesias, weakness  PSYCH: Denies depression, anxiety    VITALS:  T(F): 97.9, Max: 97.9 (09-24-20 @ 05:00)  HR: 77  BP: 139/72  RR: 18Vital Signs Last 24 Hrs  T(C): 36.6 (24 Sep 2020 05:00), Max: 36.6 (24 Sep 2020 05:00)  T(F): 97.9 (24 Sep 2020 05:00), Max: 97.9 (24 Sep 2020 05:00)  HR: 77 (24 Sep 2020 05:00) (77 - 77)  BP: 139/72 (24 Sep 2020 05:00) (139/72 - 139/72)  BP(mean): --  RR: 18 (24 Sep 2020 05:00) (18 - 18)  SpO2: 95% (24 Sep 2020 05:00) (95% - 95%)    PHYSICAL EXAM:  Gen: chronically ill appearing, cachectic  HEENT: Normocephalic, atraumatic  Neck: supple, no lymphadenopathy  CV: Regular rate & regular rhythm  Lungs: decreased BS at bases, no fremitus  Abdomen: Soft, BS present  Ext: Warm, well perfused  Neuro: non focal, awake  Skin: no rash, no erythema  Lines: no phlebitis    FH: Non-contributory  Social Hx: Non-contributory    TESTS & MEASUREMENTS:                        11.1   5.45  )-----------( 173      ( 24 Sep 2020 08:21 )             34.8     09-24    137  |  104  |  16  ----------------------------<  123<H>  5.0   |  25  |  1.0    Ca    9.0      24 Sep 2020 08:21      eGFR if Non African American: 60 mL/min/1.73M2 (09-24-20 @ 08:21)  eGFR if : 69 mL/min/1.73M2 (09-24-20 @ 08:21)                INFECTIOUS DISEASES TESTING  COVID-19 PCR: NotDetec (09-23-20 @ 16:19)  Rapid RVP Result: NotDetec (09-18-20 @ 08:40)      INFLAMMATORY MARKERS      RADIOLOGY & ADDITIONAL TESTS:  I have personally reviewed the last available Chest xray  CXR      CT      CARDIOLOGY TESTING  12 Lead ECG:   Ventricular Rate 83 BPM    Atrial Rate 83 BPM    P-R Interval 116 ms    QRS Duration 74 ms    Q-T Interval 370 ms    QTC Calculation(Bazett) 434 ms    P Axis 70 degrees    R Axis 50 degrees    T Axis 63 degrees    Diagnosis Line Normal sinus rhythm  Possible Left atrial enlargement  Nonspecific T wave abnormality  Abnormal ECG    Confirmed by ANDRES SHUKLA MD ([id number]) on 9/18/2020 1:38:36 PM (09-18-20 @ 10:39)      MEDICATIONS  atorvastatin 10 Oral at bedtime  atovaquone Suspension 1500 Oral daily  benztropine 0.5 Oral two times a day  darunavir 800 mG/cobicstat 150 mG 1 Oral daily  dolutegravir 50 Oral daily  enoxaparin Injectable 40 SubCutaneous daily  fluconAZOLE   Tablet 200 Oral daily  folic acid 1 Oral daily  nystatin    Suspension [id number] Oral four times a day  pantoprazole    Tablet 40 Oral before breakfast  QUEtiapine 25 Oral at bedtime  thiamine 100 Oral daily      WEIGHT  Weight (kg): 45 (09-18-20 @ 22:21)  Creatinine, Serum: 1.0 mg/dL (09-24-20 @ 08:21)      ANTIBIOTICS:  atovaquone Suspension 1500 milliGRAM(s) Oral daily  darunavir 800 mG/cobicstat 150 mG 1 Tablet(s) Oral daily  dolutegravir 50 milliGRAM(s) Oral daily  fluconAZOLE   Tablet 200 milliGRAM(s) Oral daily  nystatin    Suspension 668958 Unit(s) Oral four times a day      All available historical records have been reviewed
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
## 2020-09-24 NOTE — DISCHARGE NOTE NURSING/CASE MANAGEMENT/SOCIAL WORK - PATIENT PORTAL LINK FT
You can access the FollowMyHealth Patient Portal offered by [facility] by registering at the following website: [email or web address]. By joining [employer]’s FollowMyHealth portal, you will also be able to view your health information using other applications (apps) compatible with our system.
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
## 2020-09-25 ENCOUNTER — TRANSCRIPTION ENCOUNTER (OUTPATIENT)
Age: 65
End: 2020-09-25

## 2020-09-25 VITALS — DIASTOLIC BLOOD PRESSURE: 69 MMHG | RESPIRATION RATE: 19 BRPM | SYSTOLIC BLOOD PRESSURE: 114 MMHG | HEART RATE: 105 BPM

## 2020-09-25 LAB
ANION GAP SERPL CALC-SCNC: 6 MMOL/L — LOW (ref 7–14)
BASOPHILS # BLD AUTO: 0.04 K/UL — SIGNIFICANT CHANGE UP (ref 0–0.2)
BASOPHILS NFR BLD AUTO: 0.7 % — SIGNIFICANT CHANGE UP (ref 0–1)
BUN SERPL-MCNC: 14 MG/DL — SIGNIFICANT CHANGE UP (ref 10–20)
CALCIUM SERPL-MCNC: 9 MG/DL — SIGNIFICANT CHANGE UP (ref 8.5–10.1)
CHLORIDE SERPL-SCNC: 103 MMOL/L — SIGNIFICANT CHANGE UP (ref 98–110)
CO2 SERPL-SCNC: 25 MMOL/L — SIGNIFICANT CHANGE UP (ref 17–32)
CREAT SERPL-MCNC: 0.9 MG/DL — SIGNIFICANT CHANGE UP (ref 0.7–1.5)
EOSINOPHIL # BLD AUTO: 0.08 K/UL — SIGNIFICANT CHANGE UP (ref 0–0.7)
EOSINOPHIL NFR BLD AUTO: 1.5 % — SIGNIFICANT CHANGE UP (ref 0–8)
GLUCOSE BLDC GLUCOMTR-MCNC: 219 MG/DL — HIGH (ref 70–99)
GLUCOSE SERPL-MCNC: 88 MG/DL — SIGNIFICANT CHANGE UP (ref 70–99)
HCT VFR BLD CALC: 34.5 % — LOW (ref 37–47)
HGB BLD-MCNC: 10.9 G/DL — LOW (ref 12–16)
HIV-1 VIRAL LOAD RESULT: DETECTED
HIV1 RNA # SERPL NAA+PROBE: SIGNIFICANT CHANGE UP
HIV1 RNA SERPL NAA+PROBE-ACNC: DETECTED
HIV1 RNA SERPL NAA+PROBE-LOG#: 2.74 LG COP/ML — SIGNIFICANT CHANGE UP
IMM GRANULOCYTES NFR BLD AUTO: 0.4 % — HIGH (ref 0.1–0.3)
LYMPHOCYTES # BLD AUTO: 2.91 K/UL — SIGNIFICANT CHANGE UP (ref 1.2–3.4)
LYMPHOCYTES # BLD AUTO: 53.4 % — HIGH (ref 20.5–51.1)
MCHC RBC-ENTMCNC: 28.3 PG — SIGNIFICANT CHANGE UP (ref 27–31)
MCHC RBC-ENTMCNC: 31.6 G/DL — LOW (ref 32–37)
MCV RBC AUTO: 89.6 FL — SIGNIFICANT CHANGE UP (ref 81–99)
MONOCYTES # BLD AUTO: 0.54 K/UL — SIGNIFICANT CHANGE UP (ref 0.1–0.6)
MONOCYTES NFR BLD AUTO: 9.9 % — HIGH (ref 1.7–9.3)
NEUTROPHILS # BLD AUTO: 1.86 K/UL — SIGNIFICANT CHANGE UP (ref 1.4–6.5)
NEUTROPHILS NFR BLD AUTO: 34.1 % — LOW (ref 42.2–75.2)
NRBC # BLD: 0 /100 WBCS — SIGNIFICANT CHANGE UP (ref 0–0)
PLATELET # BLD AUTO: 195 K/UL — SIGNIFICANT CHANGE UP (ref 130–400)
POTASSIUM SERPL-MCNC: 4.8 MMOL/L — SIGNIFICANT CHANGE UP (ref 3.5–5)
POTASSIUM SERPL-SCNC: 4.8 MMOL/L — SIGNIFICANT CHANGE UP (ref 3.5–5)
RBC # BLD: 3.85 M/UL — LOW (ref 4.2–5.4)
RBC # FLD: 15.3 % — HIGH (ref 11.5–14.5)
SODIUM SERPL-SCNC: 134 MMOL/L — LOW (ref 135–146)
WBC # BLD: 5.45 K/UL — SIGNIFICANT CHANGE UP (ref 4.8–10.8)
WBC # FLD AUTO: 5.45 K/UL — SIGNIFICANT CHANGE UP (ref 4.8–10.8)

## 2020-09-25 PROCEDURE — 99232 SBSQ HOSP IP/OBS MODERATE 35: CPT

## 2020-09-25 RX ORDER — DOLUTEGRAVIR SODIUM 25 MG/1
1 TABLET, FILM COATED ORAL
Qty: 28 | Refills: 0
Start: 2020-09-25 | End: 2020-10-08

## 2020-09-25 RX ORDER — ATOVAQUONE 750 MG/5ML
1500 SUSPENSION ORAL
Qty: 45000 | Refills: 0
Start: 2020-09-25 | End: 2020-10-24

## 2020-09-25 RX ORDER — FLUCONAZOLE 150 MG/1
1 TABLET ORAL
Qty: 7 | Refills: 0
Start: 2020-09-25 | End: 2020-10-01

## 2020-09-25 RX ADMIN — ENOXAPARIN SODIUM 40 MILLIGRAM(S): 100 INJECTION SUBCUTANEOUS at 12:50

## 2020-09-25 RX ADMIN — DOLUTEGRAVIR SODIUM 50 MILLIGRAM(S): 25 TABLET, FILM COATED ORAL at 12:49

## 2020-09-25 RX ADMIN — PANTOPRAZOLE SODIUM 40 MILLIGRAM(S): 20 TABLET, DELAYED RELEASE ORAL at 05:25

## 2020-09-25 RX ADMIN — Medication 100 MILLIGRAM(S): at 12:49

## 2020-09-25 RX ADMIN — Medication 0.5 MILLIGRAM(S): at 05:25

## 2020-09-25 RX ADMIN — FLUCONAZOLE 200 MILLIGRAM(S): 150 TABLET ORAL at 12:51

## 2020-09-25 RX ADMIN — ATOVAQUONE 1500 MILLIGRAM(S): 750 SUSPENSION ORAL at 12:48

## 2020-09-25 RX ADMIN — Medication 500000 UNIT(S): at 12:52

## 2020-09-25 RX ADMIN — Medication 1 MILLIGRAM(S): at 12:52

## 2020-09-25 RX ADMIN — DARUNAVIR ETHANOLATE AND COBICISTAT 1 TABLET(S): 800; 150 TABLET, FILM COATED ORAL at 12:51

## 2020-09-25 NOTE — PROGRESS NOTE ADULT - PROVIDER SPECIALTY LIST ADULT
Infectious Disease
Internal Medicine

## 2020-09-25 NOTE — DISCHARGE NOTE PROVIDER - NSDCCPCAREPLAN_GEN_ALL_CORE_FT
PRINCIPAL DISCHARGE DIAGNOSIS  Diagnosis: Failure to thrive in adult  Assessment and Plan of Treatment: You came in for diarrhea and we found you to be very underweight. We were informed that you have lost more than 100 lbs in the past year. This could be in part to poor appeite or your diagnosis of AIDs, which you have not been taking as you should. When you become malnutritoned as in your case, your body does not make the energy and proteins need for your body to work the way it should. You bones can become more fragile, you immune system becomes weaker, your organs can be damaged. I encourage you to make sure you eat well to prevent these complications. In addition, AIDs is a condition that can be managed if you take your medications every day as they will help reduced the amount of virus present in your body and decrease the risk of infections with many types of bugs, especially bugs we call oppurtinistic, meaning that in an otherwise healthy individual would not cause infection. We can reduce this risk by getting your CD would above 200. Please take all your medications every day      SECONDARY DISCHARGE DIAGNOSES  Diagnosis: Diarrhea  Assessment and Plan of Treatment: resolved    Diagnosis: Recurrent syncope  Assessment and Plan of Treatment: resolved
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
## 2020-09-25 NOTE — PROGRESS NOTE ADULT - REASON FOR ADMISSION
Near syncope

## 2020-09-25 NOTE — PROGRESS NOTE BEHAVIORAL HEALTH - RISK ASSESSMENT
Patient is at elevated chronic risk due to history of psychiatric illnesses, including Schizophrenia and substance use disorders requiring multiple prior IPP admissions. Risk is acutely elevated via her nonadherence to treatment plans and decline in ability to care for herself. Protective factors include family support, stable living.

## 2020-09-25 NOTE — PROGRESS NOTE BEHAVIORAL HEALTH - NSBHCONSULTOBSREASON_PSY_A_CORE FT
no psychiatric need for 1:1 though patient may benefit from frequent reorientation by staff in hospital environment

## 2020-09-25 NOTE — DISCHARGE NOTE PROVIDER - HOSPITAL COURSE
64 yo F with AIDS on HAART, HTN, DM, HLD, pre-diabetes, psych disorder (numerous hospitalizations, treated w/ cogentin and haldol in the past), polysubstance and ETOH abuse presents to the ED with recurrent episodes of syncope/near syncope, weight loss, diarrhea and erratic behavior.    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 Order MVI w/ minerals q24hrs and continue thiamin/folic acid supplementation    #AIDS 1/22/20   - CD4 88; 4%,   - Dolutegravir 50mg qd  - Prezcobix 800mg/150mg qd  - Mepron 1500mg qd for PCP/Toxo ppx  - Follows with Coral Navarro      #Oral candidiasis  - Fluconazole 200mg PO x 7-14 days  - Nystatin s/s  - Resolved    #Pre-DM   - HbA1c 6.1  - bG 120's-180's  - Monitor FS    #Psychological disorder, no active SI/HI, multiple previous admissions  -  Psych; Seroquel 60mg PO at night and Benztropine 0.5 mg PO BID  - Cleared from psych standpoint, not candidate for IPP    #HTN  - c/w Norvasc 5mg PO qh      #HLD  - atorvastatin 10 mg PO at bedtime    #Polysubstance and alcohol abuse  - c/w folic acid and thiamine

## 2020-09-25 NOTE — PROGRESS NOTE BEHAVIORAL HEALTH - SUMMARY
Patient is 64-year-old female domiciled in private residence with 27-year-old grandson, with, and PPH of Schizophrenia (as per daughter), PMH of HIV, HTN, DM multiple IPP admissions (most at [facility]; most recently in February at [facility]), history of crack cocaine and nicotine use disorders, and current use of alcohol and marijuana, who was admitted for recurrent episodes of syncope/near syncope, weight loss, diarrhea and erratic behavior. Psychiatry was initially consulted for medication recommendations in relation to the patient's psychiatric history. On initial evaluation, the patient appeared visibly cachectic, disorganized and agitated in behavior, with possible internal preoccupation. Psychiatry was reconsulted for mental health evaluation of patient.    On evaluation today, while somewhat tangential and irritable, patient's presentation appears to be consistent with chronic baseline state rather than any acute decompensation of psychiatric disorder and is consistent with her compensated psychiatric baseline state as noted in her chart. She is not experiencing any auditory hallucinations at this time, nor does she appear internally preoccuppied or endorsing any persecutory delusions. Patient at this time does not appear to be acutely psychotic, suicidal, homicidal, manic, or depressed at this time. The possibility of a neurocognitive disorder, including an HIV-associated neurocognitive disorder cannot be excluded and are likely a contributing factor to patient's current presentation, however she is not acutely a danger to herself or others at this time. Cobicistat interaction with seroquel noted, which can lead to increased plasma levels of seroquel by approximately 300-600%, however increase to up to 100 mg still remains lower than max recommended daily dose of 750mg/day for schizophrenia even if increased by 600%. Given this, an increase to 50 mg qhs as requested by patient remains reasonable.      Recommendations:  - increase seroquel to 50 mg po qhs  - Recommend continuing benztropine 0.5 mg PO BID  - Recommend avoiding benzodiazepines as they are associated with increased mortality in elderly patients, increased risk of acute delirium, and possible paradoxical agitation    #Agitation  -For severe agitation not responding to behavioral intervention, may give haldol 2.5 mg po q6h prn, hydroxyzine 50 mg po q6h prn, with escalation to IM if patient refusing PO and remains an imminent danger to self or others. If IM antipsychotic is administered, please perform follow-up ECG for QTc monitoring. Patient is 64-year-old female domiciled in private residence with 27-year-old grandson, with, and PPH of Schizophrenia (as per daughter), PMH of HIV, HTN, DM multiple IPP admissions (most at [facility]; most recently in February at [facility]), history of crack cocaine and nicotine use disorders, and current use of alcohol and marijuana, who was admitted for recurrent episodes of syncope/near syncope, weight loss, diarrhea and erratic behavior. Psychiatry was initially consulted for medication recommendations in relation to the patient's psychiatric history. On initial evaluation, the patient appeared visibly cachectic, disorganized and agitated in behavior, with possible internal preoccupation. Psychiatry was reconsulted for mental health evaluation of patient.    On evaluation today, while somewhat tangential and irritable, patient's presentation appears to be consistent with chronic baseline state rather than any acute decompensation of psychiatric disorder and is consistent with her compensated psychiatric baseline state as noted in her chart. She is not experiencing any auditory hallucinations at this time, nor does she appear internally preoccuppied or endorsing any persecutory delusions. Patient at this time does not appear to be acutely psychotic, suicidal, homicidal, manic, or depressed at this time. The possibility of a neurocognitive disorder, including an HIV-associated neurocognitive disorder cannot be excluded and are likely a contributing factor to patient's current presentation, however she is not acutely a danger to herself or others at this time and does not warrant inpatient psychiatric hospitalization. Cobicistat interaction with seroquel noted, which can lead to increased plasma levels of seroquel by approximately 300-600%, however increase to up to 100 mg still remains lower than max recommended daily dose of 750mg/day for schizophrenia even if increased by 600%. Given this, an increase to 50 mg qhs as requested by patient remains reasonable.      Recommendations:  - increase seroquel to 50 mg po qhs  - Recommend continuing benztropine 0.5 mg PO BID  - Recommend avoiding benzodiazepines as they are associated with increased mortality in elderly patients, increased risk of acute delirium, and possible paradoxical agitation    #Agitation  -For severe agitation not responding to behavioral intervention, may give haldol 2.5 mg po q6h prn, hydroxyzine 50 mg po q6h prn, with escalation to IM if patient refusing PO and remains an imminent danger to self or others. If IM antipsychotic is administered, please perform follow-up ECG for QTc monitoring.

## 2020-09-25 NOTE — PROGRESS NOTE ADULT - NUTRITIONAL ASSESSMENT
This patient has been assessed with a concern for Malnutrition and has been determined to have a diagnosis/diagnoses of Severe protein-calorie malnutrition and Underweight/BMI < 19.    This patient is being managed with:   Diet Soft-  DASH/TLC {Sodium & Cholesterol Restricted}  Entered: Sep 22 2020  8:08AM    The following pending diet order is being considered for treatment of Severe protein-calorie malnutrition and Underweight/BMI < 19:  Diet Dysphagia 2 Mechanical Soft-Thin Liquids-  DASH/TLC {Sodium & Cholesterol Restricted}     Qty per Day:  strawberry only  Supplement Feeding Modality:  Oral  Ensure Enlive Servings Per Day:  1       Frequency:  Two Times a day  Entered: Sep 23 2020 12:28PM    Diet Dysphagia 2 Mechanical Soft-Thin Liquids-  DASH/TLC {Sodium & Cholesterol Restricted}     Qty per Day:  STRAWBERRY  Supplement Feeding Modality:  Oral  Ensure Enlive Servings Per Day:  1       Frequency:  Two Times a day  Entered: Sep 20 2020  1:13PM  
This patient has been assessed with a concern for Malnutrition and has been determined to have a diagnosis/diagnoses of Severe protein-calorie malnutrition and Underweight/BMI < 19.    This patient is being managed with:   Diet Soft-  DASH/TLC {Sodium & Cholesterol Restricted}  Entered: Sep 22 2020  8:08AM    The following pending diet order is being considered for treatment of Severe protein-calorie malnutrition and Underweight/BMI < 19:  Diet Dysphagia 2 Mechanical Soft-Thin Liquids-  DASH/TLC {Sodium & Cholesterol Restricted}     Qty per Day:  strawberry only  Supplement Feeding Modality:  Oral  Ensure Enlive Servings Per Day:  1       Frequency:  Two Times a day  Entered: Sep 23 2020 12:28PM    Diet Dysphagia 2 Mechanical Soft-Thin Liquids-  DASH/TLC {Sodium & Cholesterol Restricted}     Qty per Day:  STRAWBERRY  Supplement Feeding Modality:  Oral  Ensure Enlive Servings Per Day:  1       Frequency:  Two Times a day  Entered: Sep 20 2020  1:13PM  
This patient has been assessed with a concern for Malnutrition and has been determined to have a diagnosis/diagnoses of Severe protein-calorie malnutrition and Underweight/BMI < 19.    This patient is being managed with:   Diet Soft-  Consistent Carbohydrate {Evening Snack}  DASH/TLC {Sodium & Cholesterol Restricted}  Low Sodium  Entered: Sep 18 2020  4:05PM    
This patient has been assessed with a concern for Malnutrition and has been determined to have a diagnosis/diagnoses of Severe protein-calorie malnutrition and Underweight/BMI < 19.    This patient is being managed with:   Diet Soft-  Consistent Carbohydrate {Evening Snack}  DASH/TLC {Sodium & Cholesterol Restricted}  Low Sodium  Entered: Sep 18 2020  4:05PM    The following pending diet order is being considered for treatment of Severe protein-calorie malnutrition and Underweight/BMI < 19:  Diet Dysphagia 2 Mechanical Soft-Thin Liquids-  DASH/TLC {Sodium & Cholesterol Restricted}     Qty per Day:  STRAWBERRY  Supplement Feeding Modality:  Oral  Ensure Enlive Servings Per Day:  1       Frequency:  Two Times a day  Entered: Sep 20 2020  1:13PM  
This patient has been assessed with a concern for Malnutrition and has been determined to have a diagnosis/diagnoses of Severe protein-calorie malnutrition and Underweight/BMI < 19.    This patient is being managed with:   Diet Soft-  DASH/TLC {Sodium & Cholesterol Restricted}  Entered: Sep 22 2020  8:08AM    The following pending diet order is being considered for treatment of Severe protein-calorie malnutrition and Underweight/BMI < 19:  Diet Dysphagia 2 Mechanical Soft-Thin Liquids-  DASH/TLC {Sodium & Cholesterol Restricted}     Qty per Day:  STRAWBERRY  Supplement Feeding Modality:  Oral  Ensure Enlive Servings Per Day:  1       Frequency:  Two Times a day  Entered: Sep 20 2020  1:13PM  
This patient has been assessed with a concern for Malnutrition and has been determined to have a diagnosis/diagnoses of Severe protein-calorie malnutrition and Underweight/BMI < 19.    This patient is being managed with:   Diet Soft-  DASH/TLC {Sodium & Cholesterol Restricted}  Entered: Sep 22 2020  8:08AM    The following pending diet order is being considered for treatment of Severe protein-calorie malnutrition and Underweight/BMI < 19:  Diet Dysphagia 2 Mechanical Soft-Thin Liquids-  DASH/TLC {Sodium & Cholesterol Restricted}     Qty per Day:  strawberry only  Supplement Feeding Modality:  Oral  Ensure Enlive Servings Per Day:  1       Frequency:  Two Times a day  Entered: Sep 23 2020 12:28PM    Diet Dysphagia 2 Mechanical Soft-Thin Liquids-  DASH/TLC {Sodium & Cholesterol Restricted}     Qty per Day:  STRAWBERRY  Supplement Feeding Modality:  Oral  Ensure Enlive Servings Per Day:  1       Frequency:  Two Times a day  Entered: Sep 20 2020  1:13PM  
This patient has been assessed with a concern for Malnutrition and has been determined to have a diagnosis/diagnoses of Severe protein-calorie malnutrition and Underweight/BMI < 19.    This patient is being managed with:   Diet Soft-  Consistent Carbohydrate {Evening Snack}  DASH/TLC {Sodium & Cholesterol Restricted}  Low Sodium  Entered: Sep 18 2020  4:05PM    The following pending diet order is being considered for treatment of Severe protein-calorie malnutrition and Underweight/BMI < 19:  Diet Dysphagia 2 Mechanical Soft-Thin Liquids-  DASH/TLC {Sodium & Cholesterol Restricted}     Qty per Day:  STRAWBERRY  Supplement Feeding Modality:  Oral  Ensure Enlive Servings Per Day:  1       Frequency:  Two Times a day  Entered: Sep 20 2020  1:13PM  
This patient has been assessed with a concern for Malnutrition and has been determined to have a diagnosis/diagnoses of Severe protein-calorie malnutrition and Underweight/BMI < 19.    This patient is being managed with:   Diet Soft-  DASH/TLC {Sodium & Cholesterol Restricted}  Entered: Sep 22 2020  8:08AM    The following pending diet order is being considered for treatment of Severe protein-calorie malnutrition and Underweight/BMI < 19:  Diet Dysphagia 2 Mechanical Soft-Thin Liquids-  DASH/TLC {Sodium & Cholesterol Restricted}     Qty per Day:  STRAWBERRY  Supplement Feeding Modality:  Oral  Ensure Enlive Servings Per Day:  1       Frequency:  Two Times a day  Entered: Sep 20 2020  1:13PM  
This patient has been assessed with a concern for Malnutrition and has been determined to have a diagnosis/diagnoses of Severe protein-calorie malnutrition and Underweight/BMI < 19.    This patient is being managed with:   Diet Soft-  DASH/TLC {Sodium & Cholesterol Restricted}  Entered: Sep 22 2020  8:08AM    The following pending diet order is being considered for treatment of Severe protein-calorie malnutrition and Underweight/BMI < 19:  Diet Dysphagia 2 Mechanical Soft-Thin Liquids-  DASH/TLC {Sodium & Cholesterol Restricted}     Qty per Day:  STRAWBERRY  Supplement Feeding Modality:  Oral  Ensure Enlive Servings Per Day:  1       Frequency:  Two Times a day  Entered: Sep 20 2020  1:13PM  
This patient has been assessed with a concern for Malnutrition and has been determined to have a diagnosis/diagnoses of Severe protein-calorie malnutrition and Underweight/BMI < 19.    This patient is being managed with:   Diet Soft-  DASH/TLC {Sodium & Cholesterol Restricted}  Entered: Sep 22 2020  8:08AM    The following pending diet order is being considered for treatment of Severe protein-calorie malnutrition and Underweight/BMI < 19:  Diet Dysphagia 2 Mechanical Soft-Thin Liquids-  DASH/TLC {Sodium & Cholesterol Restricted}     Qty per Day:  strawberry only  Supplement Feeding Modality:  Oral  Ensure Enlive Servings Per Day:  1       Frequency:  Two Times a day  Entered: Sep 23 2020 12:28PM    Diet Dysphagia 2 Mechanical Soft-Thin Liquids-  DASH/TLC {Sodium & Cholesterol Restricted}     Qty per Day:  STRAWBERRY  Supplement Feeding Modality:  Oral  Ensure Enlive Servings Per Day:  1       Frequency:  Two Times a day  Entered: Sep 20 2020  1:13PM  
This patient has been assessed with a concern for Malnutrition and has been determined to have a diagnosis/diagnoses of Severe protein-calorie malnutrition and Underweight/BMI < 19.    This patient is being managed with:   Diet Soft-  DASH/TLC {Sodium & Cholesterol Restricted}  Entered: Sep 22 2020  8:08AM    The following pending diet order is being considered for treatment of Severe protein-calorie malnutrition and Underweight/BMI < 19:  Diet Dysphagia 2 Mechanical Soft-Thin Liquids-  DASH/TLC {Sodium & Cholesterol Restricted}     Qty per Day:  strawberry only  Supplement Feeding Modality:  Oral  Ensure Enlive Servings Per Day:  1       Frequency:  Two Times a day  Entered: Sep 23 2020 12:28PM    Diet Dysphagia 2 Mechanical Soft-Thin Liquids-  DASH/TLC {Sodium & Cholesterol Restricted}     Qty per Day:  STRAWBERRY  Supplement Feeding Modality:  Oral  Ensure Enlive Servings Per Day:  1       Frequency:  Two Times a day  Entered: Sep 20 2020  1:13PM

## 2020-09-25 NOTE — PROGRESS NOTE BEHAVIORAL HEALTH - NSBHCONSULTMEDS_PSY_A_CORE FT
- Recommend increase of seroquel from 25mg qhs to 50 mg qhs in line with patient's wishes, with possibility for further increase based on response and tolerability up to 100 mg PO nightly  - Recommend continuing benztropine 0.5 mg po bid - Recommend increase of Seroquel from 25mg qhs to 50 mg qhs in line with patient's wishes, with possibility for further increase based on response and tolerability up to 100 mg PO nightly or is 2 divided doses

## 2020-09-25 NOTE — PROGRESS NOTE BEHAVIORAL HEALTH - NSBHFUPINTERVALHXFT_PSY_A_CORE
Patient seen and examined at bedside.   On approach, patient was sitting up in bed. Patient reported feeling well though stated that her foot is swollen and that it has been causing her some pain. Patient somewhat tangential in speech pattern but continuously speaking about her family, however redirectable with verbal redirection. Patient stating that she has a  that she is trying to divorce but does not have the money to afford. Patient then pulled out letter from [facility] mental health services which states that she had missed an appointment at the beginning of the month and that she should reschedule it, and stated that she has been unable to call because she is late on her cellphone bill. She states that when the first of the month comes and she receives her monthly check she will then be able to pay for her cellphone bill, and use her phone to make that appointment. Patient then asked me "do you like Pierre Baker?" and started singing [employer]'s "I Want You Back". Patient reports that she would like some coffee, sweet-n-low, strawberry ice cream, and a Dr. Pepper.  When asked about medications, patient reported that she would like seroquel increased to 50mg as it "helps [her] sleep", reports she had previously been taking 100 or 200 mg at home and still has pill bottles at home. Reports she feels seroquel helps her.  Patient alert and oriented to person, place, and time.  Patient denies any suicidal ideation. Reports mood is "fine". Denies feeling that anyone wants to harm her, stating that the only thing that people want to do to her is "my boyfriend wants to have sex with me - I think I'm gonna let him".  Patient initially pleasant and cooperative but later became more irritable on revisit, and did not want to speak further.

## 2020-09-25 NOTE — PROGRESS NOTE BEHAVIORAL HEALTH - NSBHCHARTREVIEWINVESTIGATE_PSY_A_CORE FT
< from: 12 Lead ECG (09.18.20 @ 10:39) >      Ventricular Rate 83 BPM    Atrial Rate 83 BPM    P-R Interval 116 ms    QRS Duration 74 ms    Q-T Interval 370 ms    QTC Calculation(Bazett) 434 ms    P Axis 70 degrees    R Axis 50 degrees    T Axis 63 degrees    Diagnosis Line Normal sinus rhythm  Possible Left atrial enlargement  Nonspecific T wave abnormality  Abnormal ECG    Confirmed by ANDRES SHUKLA MD ([id number]) on 9/18/2020 1:38:36 PM    < end of copied text >

## 2020-09-25 NOTE — PROGRESS NOTE BEHAVIORAL HEALTH - NSBHCHARTREVIEWIMAGING_PSY_A_CORE FT
< from: Xray Chest 1 View AP/PA (09.18.20 @ 05:41) >      EXAM:  XR CHEST FRONTAL 1V            PROCEDURE DATE:  09/18/2020            INTERPRETATION:  Clinical History / Reason for exam: chest pain    Comparison : Chest radiograph 9/8/20.    Technique/Positioning: AP.    Findings:    Support devices: None.    Cardiac/mediastinum/hilum: Unremarkable.    Lung parenchyma/Pleura: Within normal limits.    Skeleton/soft tissues: stable.    Impression:    No radiographic evidence of acute cardiopulmonary disease.      DINESH SCHMIDT M.D.,ATTENDING RADIOLOGIST  This document has been electronically signed. Sep 18 2020  1:40PM    < end of copied text >

## 2020-09-25 NOTE — PROGRESS NOTE BEHAVIORAL HEALTH - NSBHCONSULTFOLLOWAFTERCARE_PSY_A_CORE FT
Recommend following up with [facility] outpatient mental health services due to established appointment, otherwise, may follow up with:  [facility]   [address] [place] [address]   [phone]  If patient going to nursing home, may then follow with in-facility behavioral health provider

## 2020-09-25 NOTE — PROGRESS NOTE ADULT - ASSESSMENT
64 yo F with AIDS on HAART, HTN, DM, HLD, pre-diabetes, psych disorder (numerous hospitalizations, treated w/ cogentin and haldol in the past), polysubstance and ETOH abuse presents to the ED with recurrent episodes of syncope/near syncope, weight loss, diarrhea and erratic behavior. 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 Order MVI w/ minerals q24hrs and continue thiamin/folic acid supplementation    #AIDS 1/22/20   - CD4 88; 4%,   - Dolutegravir 50mg qd  - Prezcobix 800mg/150mg qd  - Mepron 1500mg qd for PCP/Toxo ppx  - Follows with Coarl Navarro  - Full T Cell Subset; CD19 %: 10 %, CD3 %: 84 %, CD4 %: 4 %, CD8 %: 77 %, 4/8 Ratio: 0.06 Ratio, ABS [id number]: 65 /uL, ABS CD19: 194 /uL, ABS CD3: 1667 /uL, ABS CD4: 88 /uL, ABS CD8: 1525 /uL, [id number] %: 3 %     #Oral candidiasis  - Fluconazole 200mg PO x 7-14 days  - Nystatin s/s  - Resolved    #Pre-DM   - HbA1c 6.1  - bG 120's-140's  - Monitor FS    #Psychological disorder, no active SI/HI, multiple previous admissions  -  Psych; Seroquel 60mg PO at night and Benztropine 0.5 mg PO BID  - d/c 1:1 obs  - Cleared from psych standpoint, not candidate for IPP  - f/u Addiction medicine consult    #HTN  -/67 in ED, Last 122/68  - c/w Norvasc 5mg PO qh      #HLD  - atorvastatin 10 mg PO at bedtime    #Polysubstance and alcohol abuse  - f/u UTox  - c/w folic acid and thiamine       #Dispo:  Long term NH   Code: FULL  DVT ppx: lovenox 40 QD  Diet: carb consistent/ DASH  Act: increase as tolerated         64 yo F with AIDS on HAART, HTN, DM, HLD, pre-diabetes, psych disorder (numerous hospitalizations, treated w/ cogentin and haldol in the past), polysubstance and ETOH abuse presents to the ED with recurrent episodes of syncope/near syncope, weight loss, diarrhea and erratic behavior. 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 Order MVI w/ minerals q24hrs and continue thiamin/folic acid supplementation    #AIDS 1/22/20   - CD4 88; 4%,   - Dolutegravir 50mg qd  - Prezcobix 800mg/150mg qd  - Mepron 1500mg qd for PCP/Toxo ppx  - Follows with Coral Navarro  - Full T Cell Subset; CD19 %: 10 %, CD3 %: 84 %, CD4 %: 4 %, CD8 %: 77 %, 4/8 Ratio: 0.06 Ratio, ABS [id number]: 65 /uL, ABS CD19: 194 /uL, ABS CD3: 1667 /uL, ABS CD4: 88 /uL, ABS CD8: 1525 /uL, [id number] %: 3 %     #Oral candidiasis  - Fluconazole 200mg PO x 7-14 days  - Nystatin s/s  - Resolved    #Pre-DM   - HbA1c 6.1  - bG 120's-140's  - Monitor FS    #Psychological disorder, no active SI/HI, multiple previous admissions  -  Psych; Seroquel 60mg PO at night and Benztropine 0.5 mg PO BID  - d/c 1:1 obs  - Cleared from psych standpoint, not candidate for IPP  - f/u Addiction medicine consult    #HTN  -/67 in ED, Last 122/68  - c/w Norvasc 5mg PO qh      #HLD  - atorvastatin 10 mg PO at bedtime    #Polysubstance and alcohol abuse  - f/u UTox  - c/w folic acid and thiamine       #Dispo:  Long term NH, Patient requires skilled nursing facility,   Code: FULL  DVT ppx: lovenox 40 QD  Diet: carb consistent/ DASH  Act: increase as tolerated         64 yo F with AIDS on HAART, HTN, DM, HLD, pre-diabetes, psych disorder (numerous hospitalizations, treated w/ cogentin and haldol in the past), polysubstance and ETOH abuse presents to the ED with recurrent episodes of syncope/near syncope, weight loss, diarrhea and erratic behavior. 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 Order MVI w/ minerals q24hrs and continue thiamin/folic acid supplementation    #AIDS 1/22/20   - CD4 88; 4%,   - Dolutegravir 50mg qd  - Prezcobix 800mg/150mg qd  - Mepron 1500mg qd for PCP/Toxo ppx  - Follows with Coral Navarro  - Full T Cell Subset; CD19 %: 10 %, CD3 %: 84 %, CD4 %: 4 %, CD8 %: 77 %, 4/8 Ratio: 0.06 Ratio, ABS [id number]: 65 /uL, ABS CD19: 194 /uL, ABS CD3: 1667 /uL, ABS CD4: 88 /uL, ABS CD8: 1525 /uL, [id number] %: 3 %     #Oral candidiasis  - Fluconazole 200mg PO x 7-14 days  - Nystatin s/s  - Resolved    #Pre-DM   - HbA1c 6.1  - bG 120's-140's  - Monitor FS    #Psychological disorder, no active SI/HI, multiple previous admissions  -  Psych; Seroquel 60mg PO at night and Benztropine 0.5 mg PO BID  - d/c 1:1 obs  - Cleared from psych standpoint, not candidate for IPP  - f/u Addiction medicine consult  - Psych consulted for examination    #HTN  -/67 in ED, Last 122/68  - c/w Norvasc 5mg PO qh      #HLD  - atorvastatin 10 mg PO at bedtime    #Polysubstance and alcohol abuse  - f/u UTox  - c/w folic acid and thiamine       #Dispo:  Long term NH, Patient requires skilled nursing facility for shelter care   Code: FULL  DVT ppx: lovenox 40 QD  Diet: carb consistent/ DASH  Act: increase as tolerated

## 2020-09-25 NOTE — CHART NOTE - NSCHARTNOTEFT_GEN_A_CORE
<<<RESIDENT DISCHARGE NOTE>>>     LENA AGUIRRE  MRN-[id number]    VITAL SIGNS:  T(F): 97.8 (09-25-20 @ 06:55), Max: 97.8 (09-25-20 @ 06:55)  HR: 105 (09-25-20 @ 14:30)  BP: 114/69 (09-25-20 @ 14:30)  SpO2: 98% (09-25-20 @ 06:55)      PHYSICAL EXAMINATION:  General: NAD  Head & Neck: NCAT  Pulmonary: CTA b/l  Cardiovascular: +s1s2 RRR  Gastrointestinal/Abdomen & Pelvis: soft, NT/ND (+) bs  Neurologic/Motor: FROM x 4 5/5    TEST RESULTS:                        10.9   5.45  )-----------( 195      ( 25 Sep 2020 07:00 )             34.5       09-25    134<L>  |  103  |  14  ----------------------------<  88  4.8   |  25  |  0.9    Ca    9.0      25 Sep 2020 07:00        FINAL DISCHARGE INTERVIEW:  Resident(s) Present: (Name: Dr. Barillas), RN Present: (Name:)    DISCHARGE MEDICATION RECONCILIATION  reviewed with Attending (Dr. Morocho)    DISPOSITION:   [  ] Home,    [  ] Home with Visiting Nursing Services,   [x]  SNF/ NH,    [   ] Acute Rehab (4A),   [   ] Other (Specify:)

## 2020-09-25 NOTE — PROGRESS NOTE BEHAVIORAL HEALTH - NSBHCHARTREVIEWLAB_PSY_A_CORE FT
10.9   5.45  )-----------( 195      ( 25 Sep 2020 07:00 )             34.5     09-25    134<L>  |  103  |  14  ----------------------------<  88  4.8   |  25  |  0.9    Ca    9.0      25 Sep 2020 07:00

## 2020-09-25 NOTE — PROGRESS NOTE BEHAVIORAL HEALTH - CASE SUMMARY
Ms Carrasco is a 64 year old woman with a reported history of Schizophrenia , polysubstance dependence and AIDS who was admitted to the medical floor for the management of diarrhea , weight loss and disorganised behavior.   Psychiatry consult was initially called for medication management of her psychotropic medication. however today, the  psychiatry team was called for a mental health evaluation as the medical team feel that patient will meed a higher level of care .   Patient appears irritable , disorganised in thought and behavior and somewhat confused at times. She however does not appear acutely manic , psychotic or depressed. Patient also does not have suicidal ideations , intent or plan. Her current presentation appears more behavioral and the sequelae of a neurocognitive disorder can not be completely ruled out especially given her medical diagnosis of AIDS and chronic use of illicit drugs. Patient also appears very cachetic , with bowel incontinence, poor insight into her medical problems and inability to take care of herself. This is however not likely because of  a decompensation of her psychiatric illness but more likely worsening of her medical and neurological problems.   At this time, patient is not considered an imminent danger to herself or others and does not need inpatient psychiatric hospitalization. We are agreeable that patient will benefit from a higher level of care as it doesn't appear that she can take care of herself . However , we recommend that this decision should be made in concert with her family members. With regards to her psychotropic medication, we recommend increasing the dose of Seroquel to 50mg either daily or in divided doses to target mood lability. The drug - drug interaction between Seroquel and Cobicistat  is noted , thus , we recommend that if patient needs titration of maico medication, the Seroquel can be increased to a maximum of about 100mg total daily dose monitoring for possible extrapyramidal side effects . We would advise caution with cogentin because its anticholinergic properties can predispose patient to hypotension and falls. We also recommend outpatient psychiatry follow up with her outpatient psychiatrist and therapist at the [facility], [facility] or with the psychiatrist and or psychotherapist affiliated with the Nursing home she may be referred to.

## 2020-09-25 NOTE — PROGRESS NOTE ADULT - SUBJECTIVE AND OBJECTIVE BOX
LENA AGUIRRE  65y, Female  Allergy: No Known Allergies      LOS  7d    CHIEF COMPLAINT: Near syncope (24 Sep 2020 19:03)      INTERVAL EVENTS/HPI  - No acute events overnight, pt tangential, at times crying  - T(F): , Max: 100.2 (09-24-20 @ 20:59)  - Denies any worsening symptoms  - Tolerating medication  - WBC Count: 5.45 (09-25-20 @ 07:00)  WBC Count: 5.45 (09-24-20 @ 08:21)  - Creatinine, Serum: 0.9 (09-25-20 @ 07:00)  Creatinine, Serum: 1.0 (09-24-20 @ 08:21)       ROS  General: Denies rigors, nightsweats  HEENT: Denies headache, rhinorrhea, sore throat, eye pain  CV: Denies CP, palpitations  PULM: Denies wheezing, hemoptysis  GI: Denies hematemesis, hematochezia, melena  : Denies discharge, hematuria  MSK: Denies arthralgias, myalgias  SKIN: Denies rash, lesions  NEURO: Denies paresthesias, weakness  PSYCH: Denies depression, anxiety    VITALS:  T(F): 97.8, Max: 100.2 (09-24-20 @ 20:59)  HR: 90  BP: 124/83  RR: 19Vital Signs Last 24 Hrs  T(C): 36.6 (25 Sep 2020 06:55), Max: 37.9 (24 Sep 2020 20:59)  T(F): 97.8 (25 Sep 2020 06:55), Max: 100.2 (24 Sep 2020 20:59)  HR: 90 (25 Sep 2020 06:55) (85 - 90)  BP: 124/83 (25 Sep 2020 06:55) (124/83 - 130/93)  BP(mean): --  RR: 19 (24 Sep 2020 20:59) (19 - 19)  SpO2: 98% (25 Sep 2020 06:55) (97% - 98%)    PHYSICAL EXAM:  Gen: chronically ill appearing cachectic NAD  HEENT: Normocephalic, atraumatic, thrush resolved  Neck: supple, no lymphadenopathy  CV: Regular rate & regular rhythm  Lungs: decreased BS at bases, no fremitus  Abdomen: Soft, BS present  Ext: Warm, well perfused  Neuro: non focal, awake  Skin: no rash, no erythema  Lines: no phlebitis    FH: Non-contributory  Social Hx: Non-contributory    TESTS & MEASUREMENTS:                        10.9   5.45  )-----------( 195      ( 25 Sep 2020 07:00 )             34.5     09-25    134<L>  |  103  |  14  ----------------------------<  88  4.8   |  25  |  0.9    Ca    9.0      25 Sep 2020 07:00      eGFR if Non African American: 68 mL/min/1.73M2 (09-25-20 @ 07:00)  eGFR if : 79 mL/min/1.73M2 (09-25-20 @ 07:00)                INFECTIOUS DISEASES TESTING  COVID-19 PCR: NotDetec (09-23-20 @ 16:19)  Rapid RVP Result: NotDetec (09-18-20 @ 08:40)      INFLAMMATORY MARKERS      RADIOLOGY & ADDITIONAL TESTS:  I have personally reviewed the last available Chest xray  CXR      CT      CARDIOLOGY TESTING  12 Lead ECG:   Ventricular Rate 83 BPM    Atrial Rate 83 BPM    P-R Interval 116 ms    QRS Duration 74 ms    Q-T Interval 370 ms    QTC Calculation(Bazett) 434 ms    P Axis 70 degrees    R Axis 50 degrees    T Axis 63 degrees    Diagnosis Line Normal sinus rhythm  Possible Left atrial enlargement  Nonspecific T wave abnormality  Abnormal ECG    Confirmed by ANDRES SHUKLA MD ([id number]) on 9/18/2020 1:38:36 PM (09-18-20 @ 10:39)      MEDICATIONS  atorvastatin 10 Oral at bedtime  atovaquone Suspension 1500 Oral daily  benztropine 0.5 Oral two times a day  darunavir 800 mG/cobicstat 150 mG 1 Oral daily  dolutegravir 50 Oral daily  enoxaparin Injectable 40 SubCutaneous daily  fluconAZOLE   Tablet 200 Oral daily  folic acid 1 Oral daily  nystatin    Suspension [id number] Oral four times a day  pantoprazole    Tablet 40 Oral before breakfast  QUEtiapine 25 Oral at bedtime  thiamine 100 Oral daily      WEIGHT  Weight (kg): 45 (09-18-20 @ 22:21)  Creatinine, Serum: 0.9 mg/dL (09-25-20 @ 07:00)      ANTIBIOTICS:  atovaquone Suspension 1500 milliGRAM(s) Oral daily  darunavir 800 mG/cobicstat 150 mG 1 Tablet(s) Oral daily  dolutegravir 50 milliGRAM(s) Oral daily  fluconAZOLE   Tablet 200 milliGRAM(s) Oral daily  nystatin    Suspension [id number] Unit(s) Oral four times a day      All available historical records have been reviewed

## 2020-09-25 NOTE — PROGRESS NOTE BEHAVIORAL HEALTH - NSBHCONSULTMEDAGITATION_PSY_A_CORE FT
For severe agitation not responding to behavioral intervention, may give haldol 2.5 mg po q6h prn, hydroxyzine 50 mg po q6h prn, with escalation to IM if patient refusing PO and remains an imminent danger to self or others. If IM antipsychotic is administered, please perform follow-up ECG for QTc monitoring. For severe agitation not responding to behavioral intervention, may give haldol 2.5 mg po q6h prn,  with escalation to IM if patient refusing PO and remains an imminent danger to self or others. If IM antipsychotic is administered, please perform follow-up ECG for QTc monitoring. recommend hydroxyzine 25- 50 mg po q6h prn for anxiety and insomania

## 2020-09-25 NOTE — DISCHARGE NOTE PROVIDER - NSDCMRMEDTOKEN_GEN_ALL_CORE_FT
atorvastatin 10 mg oral tablet: 1 tab(s) orally once a day (at bedtime) x 14 days until otherwise directed by physician   atovaquone 750 mg/5 mL oral suspension: 1500 milliliter(s) orally once a day   benztropine 0.5 mg oral tablet: 1 tab(s) orally 2 times a day   dolutegravir 50 mg oral tablet: 1 tab(s) orally 2 times a day x 14 days until otherwise directed by physician   fluconazole 200 mg oral tablet: 1 tab(s) orally once a day  folic acid 1 mg oral tablet: 1 tab(s) orally once a day  metFORMIN 500 mg oral tablet: 1 tab(s) orally 2 times a day  Prezcobix 800 mg-150 mg oral tablet: 1 tab(s) orally once a day   QUEtiapine 25 mg oral tablet: 1 tab(s) orally once a day (at bedtime)   Vitamin B1 100 mg oral tablet: 1 tab(s) orally once a day

## 2020-09-25 NOTE — PROGRESS NOTE BEHAVIORAL HEALTH - NSBHCHARTREVIEWVS_PSY_A_CORE FT
Vital Signs Last 24 Hrs  T(C): 36.6 (25 Sep 2020 06:55), Max: 37.9 (24 Sep 2020 20:59)  T(F): 97.8 (25 Sep 2020 06:55), Max: 100.2 (24 Sep 2020 20:59)  HR: 105 (25 Sep 2020 14:30) (85 - 105)  BP: 114/69 (25 Sep 2020 14:30) (114/69 - 130/93)  BP(mean): --  RR: 19 (25 Sep 2020 14:30) (19 - 19)  SpO2: 98% (25 Sep 2020 06:55) (97% - 98%)
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
## 2020-09-30 DIAGNOSIS — E11.9 TYPE 2 DIABETES MELLITUS WITHOUT COMPLICATIONS: ICD-10-CM

## 2020-09-30 DIAGNOSIS — R19.7 DIARRHEA, UNSPECIFIED: ICD-10-CM

## 2020-09-30 DIAGNOSIS — R62.7 ADULT FAILURE TO THRIVE: ICD-10-CM

## 2020-09-30 DIAGNOSIS — R64 CACHEXIA: ICD-10-CM

## 2020-09-30 DIAGNOSIS — E43 UNSPECIFIED SEVERE PROTEIN-CALORIE MALNUTRITION: ICD-10-CM

## 2020-09-30 DIAGNOSIS — I27.20 PULMONARY HYPERTENSION, UNSPECIFIED: ICD-10-CM

## 2020-09-30 DIAGNOSIS — R13.10 DYSPHAGIA, UNSPECIFIED: ICD-10-CM

## 2020-09-30 DIAGNOSIS — F20.9 SCHIZOPHRENIA, UNSPECIFIED: ICD-10-CM

## 2020-09-30 DIAGNOSIS — F10.10 ALCOHOL ABUSE, UNCOMPLICATED: ICD-10-CM

## 2020-09-30 DIAGNOSIS — F17.210 NICOTINE DEPENDENCE, CIGARETTES, UNCOMPLICATED: ICD-10-CM

## 2020-09-30 DIAGNOSIS — Z91.14 PATIENT'S OTHER NONCOMPLIANCE WITH MEDICATION REGIMEN: ICD-10-CM

## 2020-09-30 DIAGNOSIS — E86.1 HYPOVOLEMIA: ICD-10-CM

## 2020-09-30 DIAGNOSIS — I10 ESSENTIAL (PRIMARY) HYPERTENSION: ICD-10-CM

## 2020-09-30 DIAGNOSIS — B20 HUMAN IMMUNODEFICIENCY VIRUS [HIV] DISEASE: ICD-10-CM

## 2020-09-30 DIAGNOSIS — F12.99 CANNABIS USE, UNSPECIFIED WITH UNSPECIFIED CANNABIS-INDUCED DISORDER: ICD-10-CM

## 2020-09-30 DIAGNOSIS — E78.5 HYPERLIPIDEMIA, UNSPECIFIED: ICD-10-CM

## 2020-09-30 DIAGNOSIS — B37.0 CANDIDAL STOMATITIS: ICD-10-CM

## 2020-12-11 NOTE — PATIENT PROFILE ADULT - NS PRO AD ANY ON CHART
Pt feels ready to  Go home, nirav solid food well. All dc inst given , meds to beds issued. Pt and dtr verbalize understanding of dc inst, instructed and viewed process of emptying elan drain, pt and dtr understanding.  New dressing to midline, staples are daniel Yes

## 2021-03-29 NOTE — PHYSICAL THERAPY INITIAL EVALUATION ADULT - MANUAL MUSCLE TESTING RESULTS, REHAB EVAL
30lb touch down weight bear on the left leg    
grossly assessed due to/3/5 for both lower extremities

## 2021-11-12 NOTE — PROGRESS NOTE BEHAVIORAL HEALTH - NS ED BHA AXIS I SECONDARY2 CODE FT
FAMILY HISTORY:  No pertinent family history in first degree relatives    
F19.20
F19.20
B20
F10.20
B20
B20
E11.9
B20
B20

## 2022-06-13 NOTE — BEHAVIORAL HEALTH ASSESSMENT NOTE - NSBHCONSFOLLOWNEEDS_PSY_A_CORE
Instructions: This plan will send the code FBSD to the PM system.  DO NOT or CHANGE the price. Detail Level: Generalized Price (Do Not Change): 0.00 Patient needs further psychiatric safety assessment prior to discharge

## 2023-02-01 NOTE — PROGRESS NOTE BEHAVIORAL HEALTH - HYGIENE
Patient is made aware of results and has no further questions at this time.    
Fair

## 2023-07-18 NOTE — BEHAVIORAL HEALTH ASSESSMENT NOTE - DIFFERENTIAL
[Follow-Up Visit] : a follow-up [Spouse] : spouse [FreeTextEntry2] : Left breast cancer with lymph node metastasis  Untreated schizophrenia vs substance induced psychosis vs delirium

## 2024-08-23 NOTE — ED PROVIDER NOTE - PHYSICAL EXAMINATION
CONSTITUTIONAL: Well-developed; well-nourished; in no acute distress, nontoxic appearing  SKIN: skin exam is warm and dry,  HEAD: Normocephalic; atraumatic.  EYES: conjunctiva and sclera clear.  ENT: MMM, no nasal congestion  NECK: Supple; non tender.  ROM intact.  CARD: S1, S2 normal, no murmur  RESP: No wheezes, rales or rhonchi. Good air movement bilaterally  ABD: soft; non-distended; non-tender. No Rebound, No guarding  EXT: Normal ROM. + left flank tenderness.    NEURO: awake, alert, following commands, oriented, grossly unremarkable. No Focal deficits. GCS 15.   PSYCH: Cooperative, appropriate. [place], [phone]

## 2024-11-08 NOTE — BEHAVIORAL HEALTH ASSESSMENT NOTE - TELEPSYCHIATRY?
Interval History: Patient seen and examined. Reports improvement of SOB. Able to ambulate to bathroom without BOSTON. Satting 98% on home 2L.     Review of Systems   Constitutional:  Negative for chills, fatigue and fever.   Respiratory:  Positive for cough and shortness of breath.    Cardiovascular:  Negative for chest pain, palpitations and leg swelling.   Gastrointestinal:  Negative for abdominal pain, constipation, diarrhea, nausea and vomiting.     Objective:     Vital Signs (Most Recent):  Temp: 97.8 °F (36.6 °C) (11/08/24 0748)  Pulse: 89 (11/08/24 0846)  Resp: 20 (11/08/24 0846)  BP: (!) 141/84 (11/08/24 0748)  SpO2: 98 % (11/08/24 0846) Vital Signs (24h Range):  Temp:  [97.8 °F (36.6 °C)-98.3 °F (36.8 °C)] 97.8 °F (36.6 °C)  Pulse:  [65-91] 89  Resp:  [16-20] 20  SpO2:  [97 %-99 %] 98 %  BP: (114-141)/(74-89) 141/84     Weight: 68 kg (150 lb)  Body mass index is 22.81 kg/m².    Intake/Output Summary (Last 24 hours) at 11/8/2024 1022  Last data filed at 11/8/2024 0942  Gross per 24 hour   Intake 600 ml   Output 2600 ml   Net -2000 ml         Physical Exam  Vitals and nursing note reviewed.   HENT:      Head: Normocephalic.      Mouth/Throat:      Mouth: Mucous membranes are moist.      Pharynx: Oropharynx is clear.   Eyes:      Conjunctiva/sclera: Conjunctivae normal.   Cardiovascular:      Rate and Rhythm: Normal rate and regular rhythm.   Pulmonary:      Effort: No respiratory distress.      Breath sounds: No wheezing.      Comments: Diffuse crackles. On 2L NC.   Abdominal:      General: Bowel sounds are normal.      Palpations: Abdomen is soft.   Musculoskeletal:      Right lower leg: No edema.      Left lower leg: No edema.   Neurological:      Mental Status: He is alert and oriented to person, place, and time.             Significant Labs: All pertinent labs within the past 24 hours have been reviewed.    Significant Imaging: I have reviewed all pertinent imaging results/findings within the past 24  No hours.

## 2024-12-05 NOTE — ED CDU PROVIDER DISPOSITION NOTE - CLINICAL COURSE
64yo woman h/o AIDS (on HAART and Bactrim), HTN, DM, HLD was placed in CDU for workup of syncope after unremarkable labs and EKG. On my eval she is cachectic, with significant temporal wasting, disheveled, and stooling on herself without realizing it. She is tangential in her history, and though she says she has had weight loss, she is unable to qualify. She insists that she takes her medications and does know some of their names, however her conversation is otherwise disorganized. She reports that she lives with a grandson but that she is currently locked out of her house. On exam she is alert and nontoxic but chronicallly ill appearing. Oral thrush noted, lungs CTA, CVS1S2 RRR abd soft, NT. Pt actively having diarrhea in bed. No rash. I feel syncope is likely due to dehydration/poor PO intake than cardiac etiology, however I am concerned for pt's level of function as she seem unable to care for herself and I doubt medication compliance. Will admit for ID eval, further workup as needed, and intensive case management. none

## 2025-01-28 NOTE — PROGRESS NOTE BEHAVIORAL HEALTH - AFFECT QUALITY
Patient presents for first time Evenity injection today. Patient stayed for 15 minutes after injections for monitoring. Patient felt fine at discharge.     I am an RN. Does the patient have an active anti-cancer treatment plan? (oral, injection, or IV) No.    Reviewed and verified Advanced Directives: Yes: Advance Directive is in chart     Pre-Injection Information: Allergies reviewed as required for injection type., Pt states feeling well, no complaints., and Pt denies signs and symptoms of infection.    Refer to MAR (medication administration record) for type of injection and medication given.  Needle Size: pre-attached needles  Location: LLQ and RLQ  Patient tolerated well: Stable    Analia ARAUJO is supervising clinician today.    Fall risk 15  (Black Fall Risk)    Distress Tool   NA    Administrations This Visit       romosozumab-aqqg (EVENITY) subcutaneous injection 210 mg       Admin Date  01/28/2025 Action  Given Dose  210 mg Route  Subcutaneous Documented By  Elsy Cooley, RN                        
Irritable

## 2025-04-16 NOTE — DISCHARGE NOTE PROVIDER - NSDCCONDITION_GEN_ALL_CORE
Stable Quality 431: Preventive Care And Screening: Unhealthy Alcohol Use - Screening: Patient not identified as an unhealthy alcohol user when screened for unhealthy alcohol use using a systematic screening method Detail Level: Detailed Quality 226: Preventive Care And Screening: Tobacco Use: Screening And Cessation Intervention: Patient screened for tobacco use and is an ex/non-smoker

## 2025-07-08 NOTE — ED ADULT NURSE NOTE - MODE OF DISCHARGE
Rectal  and  back  pain, s/p  radiation  therapy. Patient  has hx  of kidney  transplant 2017 Ambulatory